# Patient Record
Sex: FEMALE | Race: WHITE | NOT HISPANIC OR LATINO | Employment: PART TIME | URBAN - METROPOLITAN AREA
[De-identification: names, ages, dates, MRNs, and addresses within clinical notes are randomized per-mention and may not be internally consistent; named-entity substitution may affect disease eponyms.]

---

## 2017-04-27 ENCOUNTER — LAB CONVERSION - ENCOUNTER (OUTPATIENT)
Dept: OBGYN CLINIC | Facility: CLINIC | Age: 22
End: 2017-04-27

## 2017-05-25 ENCOUNTER — LAB REQUISITION (OUTPATIENT)
Dept: LAB | Facility: HOSPITAL | Age: 22
End: 2017-05-25

## 2017-05-25 DIAGNOSIS — Z02.1 ENCOUNTER FOR PRE-EMPLOYMENT EXAMINATION: ICD-10-CM

## 2017-05-25 PROCEDURE — 86480 TB TEST CELL IMMUN MEASURE: CPT | Performed by: PHYSICIAN ASSISTANT

## 2017-05-27 LAB
ANNOTATION COMMENT IMP: NORMAL
GAMMA INTERFERON BACKGROUND BLD IA-ACNC: 0.68 IU/ML
M TB IFN-G BLD-IMP: NEGATIVE
M TB IFN-G CD4+ BCKGRND COR BLD-ACNC: <0.01 IU/ML
M TB IFN-G CD4+ T-CELLS BLD-ACNC: 0.66 IU/ML
MITOGEN IGNF BLD-ACNC: 8.79 IU/ML
QUANTIFERON-TB GOLD IN TUBE: NORMAL
SERVICE CMNT-IMP: NORMAL

## 2017-09-13 ENCOUNTER — GENERIC CONVERSION - ENCOUNTER (OUTPATIENT)
Dept: OTHER | Facility: OTHER | Age: 22
End: 2017-09-13

## 2017-12-05 ENCOUNTER — ALLSCRIPTS OFFICE VISIT (OUTPATIENT)
Dept: OTHER | Facility: OTHER | Age: 22
End: 2017-12-05

## 2017-12-09 LAB
CULT RSLT GENITAL (HISTORICAL): NORMAL
MISCELLANEOUS LAB TEST RESULT (HISTORICAL): NORMAL

## 2018-01-22 VITALS
WEIGHT: 159 LBS | HEART RATE: 73 BPM | DIASTOLIC BLOOD PRESSURE: 68 MMHG | SYSTOLIC BLOOD PRESSURE: 110 MMHG | BODY MASS INDEX: 24.96 KG/M2 | HEIGHT: 67 IN

## 2018-01-23 VITALS
DIASTOLIC BLOOD PRESSURE: 60 MMHG | WEIGHT: 157.25 LBS | HEART RATE: 80 BPM | HEIGHT: 67 IN | SYSTOLIC BLOOD PRESSURE: 112 MMHG | BODY MASS INDEX: 24.68 KG/M2

## 2018-02-19 ENCOUNTER — TELEPHONE (OUTPATIENT)
Dept: OBGYN CLINIC | Facility: CLINIC | Age: 23
End: 2018-02-19

## 2018-02-19 NOTE — TELEPHONE ENCOUNTER
Excellent that the pain is improved  Can take 3-4 months for periods to return after stopping OCP  Please encourage her to use condoms, or if she would like to discuss other birth control options to make an appt

## 2018-02-19 NOTE — TELEPHONE ENCOUNTER
Patient was asked to call back to let provider know results of lidocaine cream  Pt states it did help, but she stopped her OCP and all symptoms cleared  Pt wants to take a break from OCP at present  LMP 12/25 17 patient is a bit concerned she is having unprotected sex but she took 4 UPT's all negative  Advised patient it might take some time for menses to regulate, monitor for now call if no menses by 3/1/18  Routing to provider for further recommendations

## 2018-02-20 NOTE — TELEPHONE ENCOUNTER
Spoke with patient aware can take 3-4 months for periods to return after stopping OCP  Encouraged condoms  Pt declines other BC at present

## 2018-08-09 ENCOUNTER — TELEPHONE (OUTPATIENT)
Dept: OBGYN CLINIC | Facility: CLINIC | Age: 23
End: 2018-08-09

## 2018-08-09 NOTE — TELEPHONE ENCOUNTER
Pt called stating she is trying to become pregnant and is currently 8 days late on her period  Periods for her are always regular with 28 day cycles  Did take 2 home UPTs both neg  Pt has a yearly appt on Tuesday 8/14/18 with Dr Aidan Salinas and she is requesting blood work order script for neg or pos  Pregnancy to complete prior to appt so she can get results during appt       Let me know if blood work was placed, if pt should just wait a week or two and take another UPT or if she should just discuss it at the yearly appt

## 2018-08-10 DIAGNOSIS — Z32.00 UNCONFIRMED PREGNANCY: Primary | ICD-10-CM

## 2018-08-10 NOTE — TELEPHONE ENCOUNTER
Tried to call pt to inform  Cell  Phones v/m is full  Called house phone left v/m that order was placed  She can go to any [facility] lab and they can pull up the script there

## 2018-08-14 ENCOUNTER — ANNUAL EXAM (OUTPATIENT)
Dept: OBGYN CLINIC | Facility: CLINIC | Age: 23
End: 2018-08-14
Payer: COMMERCIAL

## 2018-08-14 VITALS — BODY MASS INDEX: 24.49 KG/M2 | HEIGHT: 66 IN | WEIGHT: 152.4 LBS

## 2018-08-14 DIAGNOSIS — N91.2 AMENORRHEA: ICD-10-CM

## 2018-08-14 DIAGNOSIS — Z01.419 WOMEN'S ANNUAL ROUTINE GYNECOLOGICAL EXAMINATION: Primary | ICD-10-CM

## 2018-08-14 PROBLEM — N76.0 VAGINAL VESTIBULITIS: Status: ACTIVE | Noted: 2017-12-05

## 2018-08-14 LAB — SL AMB POCT URINE HCG: NEGATIVE

## 2018-08-14 PROCEDURE — 81025 URINE PREGNANCY TEST: CPT | Performed by: OBSTETRICS & GYNECOLOGY

## 2018-08-14 PROCEDURE — 99395 PREV VISIT EST AGE 18-39: CPT | Performed by: OBSTETRICS & GYNECOLOGY

## 2018-08-15 PROBLEM — N76.0 VAGINAL VESTIBULITIS: Status: RESOLVED | Noted: 2017-12-05 | Resolved: 2018-08-15

## 2018-08-16 NOTE — PROGRESS NOTES
ASSESSMENT & PLAN: Haven Murdock is a 21 y o  Richardsergio Fuentes with normal gynecologic exam     1   Routine well woman exam done today  2    Pap  was not done today  Current ASCCP Guidelines reviewed  Last Pap  2016 :  no abnormalities  3   The patient declined STD testing  Safe sex practices have been discussed  4   Gardasil is recommended for patients from 526 years of age  The patient has had the Gardasil vaccine series  5  The patient is sexually active  She declined contraception and options have been discussed  6   Late menses - UPT neg today, pt will repeat in 2 weeks - 13 days late for period, accepting of a pregnancy  7  The following were reviewed in today's visit: breast self exam, STD testing, use and side effects of OCPs, family planning choices, exercise and healthy diet  8  Patient to return to office in 12 months for annual or if UPT is pos  All questions have been answered to her satisfaction  CC:  Annual Gynecologic Examination    HPI: Haven Murdock is a 21 y o  Xuan Fuentes who presents for annual gynecologic examination  She has the following concerns:  13 days late for period    Health Maintenance:    She exercises 4 days per week  She wears her seatbelt routinely  She does perform regular monthly self breast exams  She feels safe at home  Patients does follow a healthy diet  History reviewed  No pertinent past medical history  Past Surgical History:   Procedure Laterality Date    TONSILLECTOMY  2015    WISDOM TOOTH EXTRACTION         Past OB/Gyn History:  Period Cycle (Days): 28  Period Pattern: Regular  Menstrual Flow: Moderate  Dysmenorrhea: NonePatient's last menstrual period was 2018 (exact date)  Menstrual History:  OB History      Para Term  AB Living    0 0 0 0 0 0    SAB TAB Ectopic Multiple Live Births    0 0 0 0 0         History of sexually transmitted infection No  Patient is currently sexually active    heterosexual Birth control: none   Last Pap  2016 :  no abnormalities  History reviewed  No pertinent family history  Social History:  Social History     Social History    Marital status: Single     Spouse name: N/A    Number of children: N/A    Years of education: N/A     Occupational History    Not on file  Social History Main Topics    Smoking status: Never Smoker    Smokeless tobacco: Never Used    Alcohol use No    Drug use: No    Sexual activity: No     Other Topics Concern    Not on file     Social History Narrative    No narrative on file     Presently lives with fiale  Patient is co-habitating  Patient is currently employed   No Known Allergies  No current outpatient prescriptions on file  Review of Systems:  A complete review of systems was performed and was negative, except as listed  Denies weight changes, excessive fatigue, urinary incontinence, urinary frequency, constipation or bowel changes, blood in stool, severe headaches, vaginal bleeding, vaginal discharge  Physical Exam:  Ht 5' 6" (1 676 m)   Wt 69 1 kg (152 lb 6 4 oz)   LMP 07/05/2018 (Exact Date)   Breastfeeding? No   BMI 24 60 kg/m²    GEN: The patient was alert and oriented x3, pleasant well-appearing female in no acute distress  HEENT:  Unremarkable  CV:  RRR, no murmurs  RESP:  Clear to auscultation bilaterally  BREAST:  Symmetric breasts with no palpable breast masses or obvious breast lesions  She has no retractions or nipple discharge  She has no axillary abnormalities or palpable masses  Self breast exam is taught  ABD:  Soft, nontender, non-distended  EXT: nontender, no edema  PELVIC:  Normal appearing external female genitalia, normal vaginal epithelium, No discharge  Cervix present   Bimanual: absent CMT,  normal uterus, non-tender  No palpable adnexal masses  Pap was not collected

## 2019-01-02 ENCOUNTER — TELEPHONE (OUTPATIENT)
Dept: OBGYN CLINIC | Facility: CLINIC | Age: 24
End: 2019-01-02

## 2019-01-02 DIAGNOSIS — Z31.9 INFERTILITY MANAGEMENT: Primary | ICD-10-CM

## 2019-01-02 NOTE — TELEPHONE ENCOUNTER
Patient left message on nurse line states she been off OCP since 12/2017 and has had unprotected sex since  For the last 3 months she has been trying to get pregnant using OPK and tracing menses  She would like to know if she can get some hormone levels check  Requesting Estrogen, FSH, Progesterone, LH and AMA  She is currently in the middle of her cycle right now  Routing to provider for advise

## 2019-01-03 PROBLEM — Z31.9 INFERTILITY MANAGEMENT: Status: ACTIVE | Noted: 2019-01-03

## 2019-01-03 NOTE — TELEPHONE ENCOUNTER
Spoke with patient advised she needs to be attempts for 12 months  States she went off OCP 12/18 first period 4/17 since menses have been 26-42  Please advise

## 2019-01-04 NOTE — TELEPHONE ENCOUNTER
Spoke with patient advised labs were ordered she should continue to monitor cycles for the next 3-6 months  Timed intercourse with OPK  She states she did one OPK three months ago did not get any positive therefore she stopped doing them and started monitor vaginal mucous and having intercourse between days 10-7  She was having intercourse daily advised to do OPK and intercouse every other day  If she doesn't get any positive OPK after 2 months call the office  Routing to provider to update

## 2019-01-04 NOTE — TELEPHONE ENCOUNTER
Ordered the labs  Would recommend pt continue to monitor cycle for the next 3-6 months before doing labs  Continue timed intercourse with OPK  Have the OPK's been positive?

## 2019-02-20 ENCOUNTER — TELEPHONE (OUTPATIENT)
Dept: OBGYN CLINIC | Facility: CLINIC | Age: 24
End: 2019-02-20

## 2019-02-20 DIAGNOSIS — N94.819 VULVODYNIA: Primary | ICD-10-CM

## 2019-02-20 NOTE — TELEPHONE ENCOUNTER
Patient states she has been amoxicillin and doxycycline and now is having vaginal burning, itching and inflammation  Denies discharge  In the past she had vulvodynia  Advised Monistat 7 if symptoms do not resolve or worsen to call the office  Pt would like new prescription for lidocaine gel  Pharmacy updated  Routing to provider to order

## 2019-02-21 NOTE — TELEPHONE ENCOUNTER
Patient states she took Monistat 3 earlier this week then yesterday she started a Monistat 7  Monistat 7 is very irritating for her  She states she also started OCP about 2 weeks ago and these are the same symptoms she had last time she was on OCP  Pharmacy updated  Routing to provider for advise

## 2019-03-05 ENCOUNTER — TELEPHONE (OUTPATIENT)
Dept: OBGYN CLINIC | Facility: CLINIC | Age: 24
End: 2019-03-05

## 2019-03-05 DIAGNOSIS — B00.9 HSV INFECTION: Primary | ICD-10-CM

## 2019-03-05 RX ORDER — VALACYCLOVIR HYDROCHLORIDE 500 MG/1
500 TABLET, FILM COATED ORAL DAILY
Qty: 30 TABLET | Refills: 2 | Status: SHIPPED | OUTPATIENT
Start: 2019-03-05 | End: 2019-08-29 | Stop reason: ALTCHOICE

## 2019-03-05 NOTE — TELEPHONE ENCOUNTER
Patient states she had an herpes outbreak about 2 weeks ago  She is trying to conceive and wants to know if she can take Valtrex daily  Pharmacy updated       Routing to provider for orders

## 2019-03-05 NOTE — TELEPHONE ENCOUNTER
Patient left message on nurse line would like to know if she can start on Valtrex daily, she has had a few outbreaks and wants to get under control  She would like to try and conceive  LM for patient to call the office

## 2019-03-06 NOTE — TELEPHONE ENCOUNTER
Spoke with patient advised Valtrex has been sent to pharmacy  Please take 500 mg daily  Verbalized understanding

## 2019-04-29 ENCOUNTER — HOSPITAL ENCOUNTER (EMERGENCY)
Facility: HOSPITAL | Age: 24
Discharge: HOME | End: 2019-04-30
Attending: EMERGENCY MEDICINE | Admitting: EMERGENCY MEDICINE
Payer: COMMERCIAL

## 2019-04-29 ENCOUNTER — APPOINTMENT (EMERGENCY)
Dept: CT IMAGING | Facility: HOSPITAL | Age: 24
End: 2019-04-29
Payer: COMMERCIAL

## 2019-04-29 DIAGNOSIS — N83.209 RUPTURED OVARIAN CYST: Primary | ICD-10-CM

## 2019-04-29 LAB
ALBUMIN SERPL BCP-MCNC: 4.2 G/DL (ref 3.5–5)
ALP SERPL-CCNC: 58 U/L (ref 46–116)
ALT SERPL W P-5'-P-CCNC: 22 U/L (ref 12–78)
ANION GAP SERPL CALCULATED.3IONS-SCNC: 9 MMOL/L (ref 4–13)
AST SERPL W P-5'-P-CCNC: 21 U/L (ref 5–45)
BACTERIA UR QL AUTO: ABNORMAL /HPF
BASOPHILS # BLD AUTO: 0.04 THOUSANDS/ΜL (ref 0–0.1)
BASOPHILS NFR BLD AUTO: 0 % (ref 0–1)
BILIRUB SERPL-MCNC: 0.4 MG/DL (ref 0.2–1)
BILIRUB UR QL STRIP: NEGATIVE
BUN SERPL-MCNC: 16 MG/DL (ref 5–25)
CALCIUM SERPL-MCNC: 9.8 MG/DL (ref 8.3–10.1)
CHLORIDE SERPL-SCNC: 103 MMOL/L (ref 100–108)
CLARITY UR: CLEAR
CO2 SERPL-SCNC: 27 MMOL/L (ref 21–32)
COLOR UR: YELLOW
CREAT SERPL-MCNC: 0.88 MG/DL (ref 0.6–1.3)
EOSINOPHIL # BLD AUTO: 0.04 THOUSAND/ΜL (ref 0–0.61)
EOSINOPHIL NFR BLD AUTO: 0 % (ref 0–6)
ERYTHROCYTE [DISTWIDTH] IN BLOOD BY AUTOMATED COUNT: 12.1 % (ref 11.6–15.1)
EXT PREG TEST URINE: NEGATIVE
GFR SERPL CREATININE-BSD FRML MDRD: 92 ML/MIN/1.73SQ M
GLUCOSE SERPL-MCNC: 118 MG/DL (ref 65–140)
GLUCOSE UR STRIP-MCNC: NEGATIVE MG/DL
HCT VFR BLD AUTO: 40.9 % (ref 34.8–46.1)
HGB BLD-MCNC: 14.3 G/DL (ref 11.5–15.4)
HGB UR QL STRIP.AUTO: ABNORMAL
IMM GRANULOCYTES # BLD AUTO: 0.04 THOUSAND/UL (ref 0–0.2)
IMM GRANULOCYTES NFR BLD AUTO: 0 % (ref 0–2)
KETONES UR STRIP-MCNC: ABNORMAL MG/DL
LEUKOCYTE ESTERASE UR QL STRIP: NEGATIVE
LIPASE SERPL-CCNC: 79 U/L (ref 73–393)
LYMPHOCYTES # BLD AUTO: 1.77 THOUSANDS/ΜL (ref 0.6–4.47)
LYMPHOCYTES NFR BLD AUTO: 14 % (ref 14–44)
MCH RBC QN AUTO: 31 PG (ref 26.8–34.3)
MCHC RBC AUTO-ENTMCNC: 35 G/DL (ref 31.4–37.4)
MCV RBC AUTO: 89 FL (ref 82–98)
MONOCYTES # BLD AUTO: 0.48 THOUSAND/ΜL (ref 0.17–1.22)
MONOCYTES NFR BLD AUTO: 4 % (ref 4–12)
MUCOUS THREADS UR QL AUTO: ABNORMAL
NEUTROPHILS # BLD AUTO: 10.47 THOUSANDS/ΜL (ref 1.85–7.62)
NEUTS SEG NFR BLD AUTO: 82 % (ref 43–75)
NITRITE UR QL STRIP: NEGATIVE
NON-SQ EPI CELLS URNS QL MICRO: ABNORMAL /HPF
NRBC BLD AUTO-RTO: 0 /100 WBCS
PH UR STRIP.AUTO: 7 [PH] (ref 4.5–8)
PLATELET # BLD AUTO: 273 THOUSANDS/UL (ref 149–390)
PMV BLD AUTO: 9.2 FL (ref 8.9–12.7)
POTASSIUM SERPL-SCNC: 3.8 MMOL/L (ref 3.5–5.3)
PROT SERPL-MCNC: 7.8 G/DL (ref 6.4–8.2)
PROT UR STRIP-MCNC: NEGATIVE MG/DL
RBC # BLD AUTO: 4.62 MILLION/UL (ref 3.81–5.12)
RBC #/AREA URNS AUTO: ABNORMAL /HPF
SODIUM SERPL-SCNC: 139 MMOL/L (ref 136–145)
SP GR UR STRIP.AUTO: 1.02 (ref 1–1.03)
UROBILINOGEN UR QL STRIP.AUTO: 0.2 E.U./DL
WBC # BLD AUTO: 12.84 THOUSAND/UL (ref 4.31–10.16)
WBC #/AREA URNS AUTO: ABNORMAL /HPF

## 2019-04-29 PROCEDURE — 96374 THER/PROPH/DIAG INJ IV PUSH: CPT

## 2019-04-29 PROCEDURE — 74177 CT ABD & PELVIS W/CONTRAST: CPT

## 2019-04-29 PROCEDURE — 99284 EMERGENCY DEPT VISIT MOD MDM: CPT | Performed by: EMERGENCY MEDICINE

## 2019-04-29 PROCEDURE — 80053 COMPREHEN METABOLIC PANEL: CPT | Performed by: EMERGENCY MEDICINE

## 2019-04-29 PROCEDURE — 36415 COLL VENOUS BLD VENIPUNCTURE: CPT | Performed by: EMERGENCY MEDICINE

## 2019-04-29 PROCEDURE — 96375 TX/PRO/DX INJ NEW DRUG ADDON: CPT

## 2019-04-29 PROCEDURE — 81001 URINALYSIS AUTO W/SCOPE: CPT

## 2019-04-29 PROCEDURE — 99284 EMERGENCY DEPT VISIT MOD MDM: CPT

## 2019-04-29 PROCEDURE — 96361 HYDRATE IV INFUSION ADD-ON: CPT

## 2019-04-29 PROCEDURE — 83690 ASSAY OF LIPASE: CPT | Performed by: EMERGENCY MEDICINE

## 2019-04-29 PROCEDURE — 81025 URINE PREGNANCY TEST: CPT | Performed by: EMERGENCY MEDICINE

## 2019-04-29 PROCEDURE — 85025 COMPLETE CBC W/AUTO DIFF WBC: CPT | Performed by: EMERGENCY MEDICINE

## 2019-04-29 RX ORDER — ACETAMINOPHEN 325 MG/1
650 TABLET ORAL ONCE
Status: COMPLETED | OUTPATIENT
Start: 2019-04-29 | End: 2019-04-29

## 2019-04-29 RX ORDER — ONDANSETRON 2 MG/ML
4 INJECTION INTRAMUSCULAR; INTRAVENOUS ONCE
Status: COMPLETED | OUTPATIENT
Start: 2019-04-29 | End: 2019-04-29

## 2019-04-29 RX ORDER — KETOROLAC TROMETHAMINE 30 MG/ML
10 INJECTION, SOLUTION INTRAMUSCULAR; INTRAVENOUS ONCE
Status: COMPLETED | OUTPATIENT
Start: 2019-04-29 | End: 2019-04-29

## 2019-04-29 RX ADMIN — KETOROLAC TROMETHAMINE 9.9 MG: 30 INJECTION, SOLUTION INTRAMUSCULAR; INTRAVENOUS at 23:23

## 2019-04-29 RX ADMIN — ONDANSETRON 4 MG: 2 INJECTION INTRAMUSCULAR; INTRAVENOUS at 23:22

## 2019-04-29 RX ADMIN — SODIUM CHLORIDE 1000 ML: 0.9 INJECTION, SOLUTION INTRAVENOUS at 23:20

## 2019-04-29 RX ADMIN — ACETAMINOPHEN 650 MG: 325 TABLET, FILM COATED ORAL at 23:21

## 2019-04-30 VITALS
WEIGHT: 161 LBS | HEART RATE: 74 BPM | RESPIRATION RATE: 16 BRPM | BODY MASS INDEX: 25.99 KG/M2 | OXYGEN SATURATION: 100 % | SYSTOLIC BLOOD PRESSURE: 107 MMHG | DIASTOLIC BLOOD PRESSURE: 55 MMHG | TEMPERATURE: 97.6 F

## 2019-04-30 RX ADMIN — IOHEXOL 100 ML: 350 INJECTION, SOLUTION INTRAVENOUS at 00:15

## 2019-08-08 ENCOUNTER — TELEPHONE (OUTPATIENT)
Dept: INFECTIOUS DISEASES | Facility: CLINIC | Age: 24
End: 2019-08-08

## 2019-08-08 NOTE — TELEPHONE ENCOUNTER
Received a call from this patient who is seeing fertility specialist who performed lab tests and her lyme titer came back positive  She is being referred to us for treatment   Patient is possibly pregnant and will know for sure after tomorrow as she is getting a blood pregnancy test  I gave pt our fax number to have her fertility specialist fax us the lab results and a physician to physician referral

## 2019-08-28 RX ORDER — VEHICLE BASE NO.18 58%-41%-1%
WAX (GRAM) MISCELLANEOUS
COMMUNITY
Start: 2019-07-17 | End: 2019-08-29 | Stop reason: ALTCHOICE

## 2019-08-28 RX ORDER — ESTRADIOL 2 MG/1
TABLET ORAL
COMMUNITY
Start: 2019-07-17 | End: 2019-08-29 | Stop reason: ALTCHOICE

## 2019-08-28 RX ORDER — CHORIONIC GONADOTROPIN 10000 UNIT
KIT INTRAMUSCULAR
COMMUNITY
Start: 2019-05-24 | End: 2019-08-29 | Stop reason: ALTCHOICE

## 2019-08-28 RX ORDER — PROGESTERONE 50 MG/ML
INJECTION, SOLUTION INTRAMUSCULAR
COMMUNITY
Start: 2019-07-17 | End: 2019-08-29 | Stop reason: ALTCHOICE

## 2019-08-28 RX ORDER — METRONIDAZOLE 7.5 MG/G
GEL VAGINAL
Refills: 0 | COMMUNITY
Start: 2019-06-10 | End: 2019-08-29 | Stop reason: ALTCHOICE

## 2019-08-29 ENCOUNTER — OFFICE VISIT (OUTPATIENT)
Dept: INFECTIOUS DISEASES | Facility: CLINIC | Age: 24
End: 2019-08-29
Payer: COMMERCIAL

## 2019-08-29 VITALS
RESPIRATION RATE: 16 BRPM | TEMPERATURE: 96.6 F | SYSTOLIC BLOOD PRESSURE: 116 MMHG | HEART RATE: 68 BPM | WEIGHT: 154 LBS | DIASTOLIC BLOOD PRESSURE: 70 MMHG | BODY MASS INDEX: 24.75 KG/M2 | HEIGHT: 66 IN

## 2019-08-29 DIAGNOSIS — A69.20 LYME DISEASE: Primary | ICD-10-CM

## 2019-08-29 DIAGNOSIS — Z31.9 INFERTILITY MANAGEMENT: ICD-10-CM

## 2019-08-29 PROCEDURE — 99203 OFFICE O/P NEW LOW 30 MIN: CPT | Performed by: INTERNAL MEDICINE

## 2019-08-29 RX ORDER — NORETHINDRONE AND ETHINYL ESTRADIOL 1 MG-35MCG
1 KIT ORAL DAILY
Refills: 0 | COMMUNITY
Start: 2019-08-15 | End: 2021-04-15

## 2019-08-29 NOTE — PATIENT INSTRUCTIONS
You were appropriately treated for your presumed Lyme disease  This is considered cured  Since your Lyme test looks for antibodies in your blood, it may remain positive for years  We do not recommend repeat testing for this reason  As you are clinically improved, you are cleared from an infectious disease standpoint to continue to pursue your fertility treatment at any time  Good luck!

## 2019-08-29 NOTE — PROGRESS NOTES
Outpatient Consultation - Infectious Disease   Joice Carrel 25 y o  female MRN: [id number]      IMPRESSION & RECOMMENDATIONS:   Impression/Recommendations:  1  Lyme disease  Characterized by classic EM rash, neck stiffness, constitutional symptoms, and positive Western blot IgM  Status post appropriate course of 2 weeks of amoxicillin  Clinically improved  Treatment course is considered curative  Rec:   · Pathophysiology, clinical presentation, diagnosis, and treatment of Lyme disease discussed in detail with the patient  · As treatment course is considered curative, no further antibiotics indicated  · As Western blot may remain positive for years, do not recommend follow-up testing  · As patient clinically improved, would be cleared from an ID perspective to continue to pursue fertility treatment as clinically indicated    2  Infertility  Undergoing IVF  Return to office only as needed  Antibiotics:  None    Thank you for this consultation  HISTORY OF PRESENT ILLNESS:  Reason for Consult:  Lyme disease    HPI: Joice Carrel is a 25 y o  female otherwise healthy  She is currently being seen by a reproductive specialist and undergoing infertility treatment  Earlier this month she developed a diffuse red circular rash over her body associated with neck stiffness  She showed me pictures on her cell phone which revealed a flat red circular bull's-eye like rash on her upper arm  [place]  She does have a pet dog which sleeps in the same bed as her  She underwent Lyme testing which revealed a positive Lyme IgM on Western blot  She was treated with 14 days of Augmentin 3 times daily which she finished on Saturday  Doxycycline was avoided as she was undergoing embryo transfer at the same time  She states she feels clinically improved  She denies any residual rash, fever, headache, fatigue, or joint symptoms    She is currently awaiting surgery for an ovarian cyst and then will be pursuing embryo transfer in approximately 6 weeks  We are asked to comment on further evaluation and management  REVIEW OF SYSTEMS:  As per HPI  A complete system-based review of systems is otherwise negative  PAST MEDICAL HISTORY:  No past medical history on file  Past Surgical History:   Procedure Laterality Date    TONSILLECTOMY  2015    WISDOM TOOTH EXTRACTION  2012       FAMILY HISTORY:  Non-contributory    SOCIAL HISTORY:  Social History     Substance and Sexual Activity   Alcohol Use No     Social History     Substance and Sexual Activity   Drug Use No     Social History     Tobacco Use   Smoking Status Never Smoker   Smokeless Tobacco Never Used       ALLERGIES:  No Known Allergies    MEDICATIONS:  All current active medications have been reviewed  PHYSICAL EXAM:  Vitals:  /70   Pulse 68   Temp (!) 96 6 °F (35 9 °C)   Resp 16   Ht 5' 6" (1 676 m)   Wt 69 9 kg (154 lb)   BMI 24 86 kg/m²      Physical Exam:  General:  Well-nourished, well-developed, in no acute distress  Eyes:  Conjunctive clear with no hemorrhages or effusions  Neck:  Full range of motion  Lungs:  Clear to auscultation bilaterally, no accessory muscle use  Cardiac:  Regular rate  Abdomen:  Nondistended  Extremities:  No peripheral cyanosis, clubbing, or edema  Skin:  No rashes, no ulcers  Neurological:  Moves all four extremities spontaneously, sensation grossly intact    LABS, IMAGING, & OTHER STUDIES:  Lab Results:  I have personally reviewed pertinent labs  Lyme Western blot from lab core 08/05/2019 IgM positive, IgG negative    Imaging Studies:   I have personally reviewed pertinent imaging study reports and images in PACS  EKG, Pathology, and Other Studies:   I have personally reviewed pertinent reports

## 2019-12-04 LAB
EXTERNAL CHLAMYDIA RESULT: NOT DETECTED
EXTERNAL HIV SCREEN: NORMAL
N GONORRHOEA RRNA SPEC QL PROBE: NOT DETECTED

## 2020-06-16 LAB — EXT SARS-COV-2: NEGATIVE

## 2021-01-30 LAB
EXTERNAL CHLAMYDIA RESULT: NEGATIVE
EXTERNAL HIV SCREEN: NORMAL
HCV AB SER-ACNC: NEGATIVE
N GONORRHOEA RRNA SPEC QL PROBE: NEGATIVE

## 2021-04-15 ENCOUNTER — OFFICE VISIT (OUTPATIENT)
Dept: OBGYN CLINIC | Facility: CLINIC | Age: 26
End: 2021-04-15
Payer: COMMERCIAL

## 2021-04-15 VITALS
SYSTOLIC BLOOD PRESSURE: 112 MMHG | WEIGHT: 178.6 LBS | BODY MASS INDEX: 28.7 KG/M2 | HEIGHT: 66 IN | DIASTOLIC BLOOD PRESSURE: 64 MMHG

## 2021-04-15 DIAGNOSIS — Z11.3 SCREENING FOR STD (SEXUALLY TRANSMITTED DISEASE): ICD-10-CM

## 2021-04-15 DIAGNOSIS — N91.2 AMENORRHEA: Primary | ICD-10-CM

## 2021-04-15 DIAGNOSIS — Z12.4 SCREENING FOR MALIGNANT NEOPLASM OF CERVIX: ICD-10-CM

## 2021-04-15 LAB — SL AMB POCT URINE HCG: ABNORMAL

## 2021-04-15 PROCEDURE — 81025 URINE PREGNANCY TEST: CPT | Performed by: PHYSICIAN ASSISTANT

## 2021-04-15 PROCEDURE — 99213 OFFICE O/P EST LOW 20 MIN: CPT | Performed by: PHYSICIAN ASSISTANT

## 2021-04-15 PROCEDURE — 87591 N.GONORRHOEAE DNA AMP PROB: CPT | Performed by: PHYSICIAN ASSISTANT

## 2021-04-15 PROCEDURE — 87491 CHLMYD TRACH DNA AMP PROBE: CPT | Performed by: PHYSICIAN ASSISTANT

## 2021-04-15 RX ORDER — PROGESTERONE 100 MG/1
50 INSERT VAGINAL 3 TIMES DAILY
COMMUNITY
Start: 2021-04-02 | End: 2021-05-11 | Stop reason: HOSPADM

## 2021-04-15 RX ORDER — VALACYCLOVIR HYDROCHLORIDE 500 MG/1
500 TABLET, FILM COATED ORAL
COMMUNITY
End: 2021-05-10 | Stop reason: ALTCHOICE

## 2021-04-15 RX ORDER — LEVOTHYROXINE SODIUM 0.03 MG/1
TABLET ORAL
COMMUNITY
Start: 2021-03-28 | End: 2021-05-10 | Stop reason: ALTCHOICE

## 2021-04-15 RX ORDER — DOXYCYCLINE HYCLATE 100 MG/1
CAPSULE ORAL
COMMUNITY
Start: 2021-02-03 | End: 2021-04-15 | Stop reason: ALTCHOICE

## 2021-04-15 RX ORDER — MELATONIN
1000 DAILY
COMMUNITY

## 2021-04-15 RX ORDER — ASPIRIN 81 MG/1
81 TABLET ORAL
COMMUNITY
End: 2021-04-28 | Stop reason: ALTCHOICE

## 2021-04-17 LAB
C TRACH DNA SPEC QL NAA+PROBE: NEGATIVE
N GONORRHOEA DNA SPEC QL NAA+PROBE: NEGATIVE

## 2021-04-22 ENCOUNTER — OFFICE VISIT (OUTPATIENT)
Dept: OBGYN CLINIC | Facility: CLINIC | Age: 26
End: 2021-04-22
Payer: COMMERCIAL

## 2021-04-22 VITALS — DIASTOLIC BLOOD PRESSURE: 70 MMHG | WEIGHT: 181 LBS | SYSTOLIC BLOOD PRESSURE: 112 MMHG | BODY MASS INDEX: 29.21 KG/M2

## 2021-04-22 DIAGNOSIS — N91.2 AMENORRHEA: Primary | ICD-10-CM

## 2021-04-22 PROCEDURE — 99213 OFFICE O/P EST LOW 20 MIN: CPT | Performed by: PHYSICIAN ASSISTANT

## 2021-04-22 NOTE — PROGRESS NOTES
Assessment/Plan:  - TVUS reveals viable IUP at 7w6d GA, with  bpm  - Continue PNV  - Call for concerns  - Has ob intake scheduled       Diagnoses and all orders for this visit:    Amenorrhea  -     AMB US OB < 14 weeks single or first gestation level 1          Subjective:      Patient ID: Mike Cai is a 32 y o  female  Parish Caceres is a 27YO [id number] WF presenting to the office for early pregnancy US followup  She is feeling well at this time and has no complaints  Last week, patient presented for US with viable IUP, measuring 6w4d GA and FHR of 128bpm  She denies bleeding, cramping or abdominal pain  The following portions of the patient's history were reviewed and updated as appropriate: allergies, current medications, past family history, past medical history, past social history, past surgical history and problem list     Review of Systems   Constitutional: Negative for chills, fever and unexpected weight change  Respiratory: Negative for shortness of breath  Cardiovascular: Negative for chest pain  Gastrointestinal: Negative for abdominal pain, diarrhea, nausea and vomiting  Skin: Negative for rash  Objective:      /70   Wt 82 1 kg (181 lb)   LMP 02/16/2021 (Exact Date)   Breastfeeding No   BMI 29 21 kg/m²          Physical Exam  Vitals signs reviewed  Constitutional:       Appearance: Normal appearance  She is normal weight  HENT:      Head: Normocephalic and atraumatic  Pulmonary:      Effort: Pulmonary effort is normal    Abdominal:      General: Abdomen is flat  Palpations: Abdomen is soft  Genitourinary:     Pubic Area: No rash  Labia:         Right: No rash or lesion  Left: No rash or lesion  Comments: TVUS reveals IUP, yolk sac, fetal pole, +CM  CRL 1 54cm (7w6d GA)   bpm  Skin:     General: Skin is warm and dry  Neurological:      General: No focal deficit present  Mental Status: She is alert     Psychiatric: Mood and Affect: Mood normal          Behavior: Behavior normal              Ultrasound Probe Disinfection    A transvaginal ultrasound was performed     Prior to use, disinfection was performed with High Level Disinfection Process (Trophon)  Probe serial number [id number]: [id number] was used    Kelton Dumont PA-C  04/22/21  3:44 PM

## 2021-04-28 ENCOUNTER — INITIAL PRENATAL (OUTPATIENT)
Dept: OBGYN CLINIC | Facility: CLINIC | Age: 26
End: 2021-04-28

## 2021-04-28 ENCOUNTER — APPOINTMENT (OUTPATIENT)
Dept: LAB | Facility: AMBULARY SURGERY CENTER | Age: 26
End: 2021-04-28
Payer: COMMERCIAL

## 2021-04-28 VITALS
DIASTOLIC BLOOD PRESSURE: 64 MMHG | HEIGHT: 66 IN | BODY MASS INDEX: 28.64 KG/M2 | WEIGHT: 178.2 LBS | SYSTOLIC BLOOD PRESSURE: 116 MMHG

## 2021-04-28 DIAGNOSIS — O09.891 HISTORY OF PRETERM DELIVERY, CURRENTLY PREGNANT IN FIRST TRIMESTER: ICD-10-CM

## 2021-04-28 DIAGNOSIS — Z3A.08 8 WEEKS GESTATION OF PREGNANCY: ICD-10-CM

## 2021-04-28 DIAGNOSIS — O09.891 HISTORY OF PRETERM DELIVERY, CURRENTLY PREGNANT IN FIRST TRIMESTER: Primary | ICD-10-CM

## 2021-04-28 DIAGNOSIS — Z87.59 HISTORY OF TWIN PREGNANCY IN PRIOR PREGNANCY: ICD-10-CM

## 2021-04-28 DIAGNOSIS — Z98.891 HISTORY OF CESAREAN SECTION, LOW TRANSVERSE: ICD-10-CM

## 2021-04-28 LAB
ABO GROUP BLD: NORMAL
BLD GP AB SCN SERPL QL: NEGATIVE
ERYTHROCYTE [DISTWIDTH] IN BLOOD BY AUTOMATED COUNT: 12.7 % (ref 11.6–15.1)
HBV SURFACE AG SER QL: NORMAL
HCT VFR BLD AUTO: 40.7 % (ref 34.8–46.1)
HCV AB SER QL: NORMAL
HGB BLD-MCNC: 13.9 G/DL (ref 11.5–15.4)
HIV 1+2 AB+HIV1 P24 AG SERPL QL IA: NORMAL
MCH RBC QN AUTO: 30.2 PG (ref 26.8–34.3)
MCHC RBC AUTO-ENTMCNC: 34.2 G/DL (ref 31.4–37.4)
MCV RBC AUTO: 88 FL (ref 82–98)
PLATELET # BLD AUTO: 251 THOUSANDS/UL (ref 149–390)
PMV BLD AUTO: 9.8 FL (ref 8.9–12.7)
RBC # BLD AUTO: 4.61 MILLION/UL (ref 3.81–5.12)
RH BLD: NEGATIVE
RUBV IGG SERPL IA-ACNC: >175 IU/ML
SPECIMEN EXPIRATION DATE: NORMAL
WBC # BLD AUTO: 5.53 THOUSAND/UL (ref 4.31–10.16)

## 2021-04-28 PROCEDURE — 87340 HEPATITIS B SURFACE AG IA: CPT

## 2021-04-28 PROCEDURE — OBC: Performed by: PHYSICIAN ASSISTANT

## 2021-04-28 PROCEDURE — 86803 HEPATITIS C AB TEST: CPT

## 2021-04-28 PROCEDURE — 86592 SYPHILIS TEST NON-TREP QUAL: CPT

## 2021-04-28 PROCEDURE — 86850 RBC ANTIBODY SCREEN: CPT

## 2021-04-28 PROCEDURE — 36415 COLL VENOUS BLD VENIPUNCTURE: CPT

## 2021-04-28 PROCEDURE — 86762 RUBELLA ANTIBODY: CPT

## 2021-04-28 PROCEDURE — 85027 COMPLETE CBC AUTOMATED: CPT

## 2021-04-28 PROCEDURE — 86900 BLOOD TYPING SEROLOGIC ABO: CPT

## 2021-04-28 PROCEDURE — 87389 HIV-1 AG W/HIV-1&-2 AB AG IA: CPT

## 2021-04-28 PROCEDURE — 86787 VARICELLA-ZOSTER ANTIBODY: CPT

## 2021-04-28 PROCEDURE — 86901 BLOOD TYPING SEROLOGIC RH(D): CPT

## 2021-04-28 PROCEDURE — 87086 URINE CULTURE/COLONY COUNT: CPT

## 2021-04-28 NOTE — PROGRESS NOTES
Mela hill 27YO [id number]  presenting to the office for OB intake  She is feeling well today and has no complaints  Patient has a hx of infertility, but reports this pregnancy was a spontaneous conception  She had IVF with her last pregnancy and had a primary c/s for mono/di twins  Patient desires a TOLAC for this pregnancy  Patient is currently taking injectable progesterone daily given to her by A  She is due to stop that injection on , and [facility] to have a prog level drawn when she stops that medication  She is also currently on levothyroxine for a hx of PP thyroiditis from her last pregnancy  Last TSH was 0 9 per patient, drawn about 2 weeks ago  She was followed by endocrine who recommended monthly TSH draws  Patient presents for OB intake interview  Patient oriented to practice, different practice providers, different practice locations  Reviewed expected prenatal visit schedule  Accompanied by:     OB History        3    Para   1    Term   0       1    AB   1    Living   2       SAB   0    TAB   0    Ectopic   0    Multiple   1    Live Births   2                 Hx of  delivery prior to 36 weeks 6 days: yes, mono/di twins, delivered at 36 3 weeks by primary c/s     Last Menstrual Period: Patient's last menstrual period was 2021 (exact date)  Estimated Date of Delivery: 12/3/21 based on early US on                 Signs/Symptoms of Pregnancy                            Breast tenderness: no              Fatigue: yes              Cramping: no              Nausea/Vomiting: no     Pregravid BMI: Body mass index is 28  Discussed appropriate weight gain in pregnancy based on pre-gravid BMI       Diabetes              Pregestational DM:  no              hx of GDM: no              BMI >35: no              first degree relative with type 2 diabetes: no              hx of PCOS: no, reports hx of oligoanovulation              current metformin use: no              prior hx of LGA/macrosomia: no                   Hypertension              Hx of chronic HTN: no              hx of gestational HTN: no              hx of preeclampsia, eclampsia, or HELLP syndrome: no              Family h/o preeclampsia:no              Age 28 or older:no              Multifetal gestation: yes, twins, mono/di  Type 1 or Type 2 DM: no  Renal Disease:no  Autoimmune disease (systemic lupus erythematosus, antiphospholipid antibody syndrome): no  Nulliparity: no  Obesity (BMI over 30): no  More than 10 year pregnancy interval: no  Previous IUGR, low birthweight or small for gestational age: no        Infection Screening              Does the pt have a hx of MRSA? no              Recent travel outside of [place]? no  Zika precautions discussed: yes     Immunizations:              influenza vaccine: declines              discussed Tdap vaccine administration at 27-28 weeks   Declines covid vaccine     Interview education              [facility] Pregnancy Essentials Book reviewed and discussed                          Handouts given:                          Nutrition During Pregnancy  Prenatal Genetic Screening Tests                          Immunization & Pregnancy                          Medications & Pregnancy                          Warning Signs During Pregnancy                          Baby and Me support center                          [facility] Childbirth and Parenting Classes  [facility] [address] in Pregnancy    Dental visit with last 6 months -   PHQ-2/9 score: 0         MyChart activated (not 1518 years of age)?:   § Code provided?            Interview Done by: Cosme Kuhn PA-C

## 2021-04-29 LAB
BACTERIA UR CULT: NORMAL
RPR SER QL: NORMAL
VZV IGG SER IA-ACNC: NORMAL

## 2021-05-08 ENCOUNTER — APPOINTMENT (EMERGENCY)
Dept: ULTRASOUND IMAGING | Facility: HOSPITAL | Age: 26
End: 2021-05-08
Payer: COMMERCIAL

## 2021-05-08 ENCOUNTER — HOSPITAL ENCOUNTER (EMERGENCY)
Facility: HOSPITAL | Age: 26
Discharge: HOME | End: 2021-05-08
Attending: EMERGENCY MEDICINE
Payer: COMMERCIAL

## 2021-05-08 VITALS
TEMPERATURE: 98.6 F | SYSTOLIC BLOOD PRESSURE: 118 MMHG | RESPIRATION RATE: 18 BRPM | HEART RATE: 89 BPM | OXYGEN SATURATION: 99 % | DIASTOLIC BLOOD PRESSURE: 71 MMHG

## 2021-05-08 DIAGNOSIS — O03.9 FETAL DEMISE DUE TO MISCARRIAGE: Primary | ICD-10-CM

## 2021-05-08 LAB
ABO GROUP BLD: NORMAL
BLD GP AB SCN SERPL QL: NEGATIVE
RH BLD: NEGATIVE

## 2021-05-08 PROCEDURE — NC001 PR NO CHARGE: Performed by: OBSTETRICS & GYNECOLOGY

## 2021-05-08 PROCEDURE — 99285 EMERGENCY DEPT VISIT HI MDM: CPT | Performed by: EMERGENCY MEDICINE

## 2021-05-08 PROCEDURE — 36415 COLL VENOUS BLD VENIPUNCTURE: CPT | Performed by: EMERGENCY MEDICINE

## 2021-05-08 PROCEDURE — 76801 OB US < 14 WKS SINGLE FETUS: CPT

## 2021-05-08 PROCEDURE — 86900 BLOOD TYPING SEROLOGIC ABO: CPT | Performed by: EMERGENCY MEDICINE

## 2021-05-08 PROCEDURE — 96372 THER/PROPH/DIAG INJ SC/IM: CPT

## 2021-05-08 PROCEDURE — 86850 RBC ANTIBODY SCREEN: CPT | Performed by: EMERGENCY MEDICINE

## 2021-05-08 PROCEDURE — 86901 BLOOD TYPING SEROLOGIC RH(D): CPT | Performed by: EMERGENCY MEDICINE

## 2021-05-08 PROCEDURE — 99284 EMERGENCY DEPT VISIT MOD MDM: CPT

## 2021-05-08 RX ADMIN — HUMAN RHO(D) IMMUNE GLOBULIN 300 MCG: 300 INJECTION, SOLUTION INTRAMUSCULAR at 22:45

## 2021-05-08 NOTE — ED PROVIDER NOTES
History  Chief Complaint   Patient presents with    Evaluation of Abnormal Diagnostic Test     Pt referred by ultrasound technician stating no heart beat was found on external ultrasound  Pt 10 weeks pregnant  Denies complaints  Very pleasant 24-year-old female, presents with abnormal outpatient ultrasound  , patient states she is 10 weeks gestation by LMP, had a outpatient ultrasound transabdominally, they were unable to see fetal activity, patient says she has retrograde uterus and frequently they have to do transvaginal early on to assess the fetus  , otherwise no abdominal pain no abdominal cramping, no vaginal bleeding  No fevers no chills no nausea no vomiting no other modifying or alleviating symptoms  , patient says she otherwise is her normal state of health  Prior to Admission Medications   Prescriptions Last Dose Informant Patient Reported? Taking? Endometrin 100 MG vaginal insert   Yes No   Si mg Switched to 50mg/1ml Progesterone injection  Pt to stop on      Prenatal Multivit-Min-Fe-FA (PRENATAL VITAMINS PO)   Yes No   Sig: Take by mouth   cholecalciferol (VITAMIN D3) 1,000 units tablet  Self Yes No   Sig: Take 1,000 Units by mouth daily   levothyroxine 25 mcg tablet   Yes No   Sig: take 1 tablet by mouth once daily AT LEAST 30 MINUTES PRIOR TO BREAKFAST/OTHER MEDS   progesterone (PROMETRIUM) 100 MG capsule   Yes No   Sig: Take 100 mg by mouth daily   valACYclovir (VALTREX) 500 mg tablet   Yes No   Sig: Take 500 mg by mouth      Facility-Administered Medications: None       Past Medical History:   Diagnosis Date    Disease of thyroid gland     postpartum thyroiditis    Female infertility     Varicella     had chickenpox as a child       Past Surgical History:   Procedure Laterality Date     SECTION      primary for twins, B was breech    OVARIAN CYST REMOVAL Left     dermoid cyst removed, pt still has ovary    TONSILLECTOMY     [address] [place] [address]   Family History   Problem Relation Age of Onset    COPD Maternal Grandmother     Heart disease Maternal Grandfather     Heart attack Maternal Grandfather      I have reviewed and agree with the history as documented  E-Cigarette/Vaping    E-Cigarette Use Never User      E-Cigarette/Vaping Substances     Social History     Tobacco Use    Smoking status: Never Smoker    Smokeless tobacco: Never Used   Substance Use Topics    Alcohol use: No    Drug use: No       Review of Systems   Constitutional: Negative for activity change, chills, diaphoresis and fever  HENT: Negative for congestion, sinus pressure and sore throat  Eyes: Negative for pain and visual disturbance  Respiratory: Negative for cough, chest tightness, shortness of breath, wheezing and stridor  Cardiovascular: Negative for chest pain and palpitations  Gastrointestinal: Negative for abdominal distention, abdominal pain, constipation, diarrhea, nausea and vomiting  Genitourinary: Negative for dysuria and frequency  Musculoskeletal: Negative for neck pain and neck stiffness  Skin: Negative for rash  Neurological: Negative for dizziness, speech difficulty, light-headedness, numbness and headaches  Physical Exam  Physical Exam  Vitals signs reviewed  Constitutional:       General: She is not in acute distress  Appearance: She is well-developed  She is not diaphoretic  HENT:      Head: Normocephalic and atraumatic  Right Ear: External ear normal       Left Ear: External ear normal       Nose: Nose normal    Eyes:      General:         Right eye: No discharge  Left eye: No discharge  Pupils: Pupils are equal, round, and reactive to light  Neck:      Musculoskeletal: Normal range of motion and neck supple  Trachea: No tracheal deviation  Cardiovascular:      Rate and Rhythm: Normal rate and regular rhythm  Heart sounds: Normal heart sounds  No murmur     Pulmonary:      Effort: Pulmonary effort is normal  No respiratory distress  Breath sounds: Normal breath sounds  No stridor  Abdominal:      General: There is no distension  Palpations: Abdomen is soft  Tenderness: There is no abdominal tenderness  There is no guarding or rebound  Musculoskeletal: Normal range of motion  Skin:     General: Skin is warm and dry  Coloration: Skin is not pale  Findings: No erythema  Neurological:      General: No focal deficit present  Mental Status: She is alert and oriented to person, place, and time  Vital Signs  ED Triage Vitals [05/08/21 1656]   Temperature Pulse Respirations Blood Pressure SpO2   98 6 °F (37 °C) 74 18 131/73 100 %      Temp Source Heart Rate Source Patient Position - Orthostatic VS BP Location FiO2 (%)   Oral Monitor Sitting Right arm --      Pain Score       No Pain           Vitals:    05/08/21 1656   BP: 131/73   Pulse: 74   Patient Position - Orthostatic VS: Sitting         Visual Acuity      ED Medications  Medications   Rho(D) immune globulin (RHOGAM ULTRA-FILTERED PLUS) IM injection 300 mcg (has no administration in time range)       Diagnostic Studies  Results Reviewed     None                 US OB < 14 weeks with transvaginal   Final Result by Sarmad Amin MD (05/08 1844)      Single intrauterine gestation at 8 weeks 1 days (range +/- 0W5D)  Absent cardiac activity consistent with fetal demise              Workstation performed: [id number]                    Procedures  Procedures         ED Course  ED Course as of May 08 2008   Sat May 08, 2021   1911 Unfortunately patient with a 8 week fetal demise, electing to have a D and E , discussed case with OB,  they will be down to evaluate the patient to schedule for outpatient D and E                                              MDM  Number of Diagnoses or Management Options  Fetal demise due to miscarriage: new and requires workup  Diagnosis management comments: Initial ED assessment:  69-year-old female, outpatient ultrasound unable to assess fetal activity or heart rate sent in for further evaluation    Initial DDx includes but is not limited to:   Fetal demise, retrograde uterus making it difficult for transabdominal ultrasound to evaluate fetus    Initial ED plan:   Transvaginal ultrasound        Final ED summary/disposition:   After evaluation and workup in the emergency department, unfortunately found to have fetal demise, OB consult as patient is electing have an outpatient D and E  , this was set up from the emergency department  , patient to get RhoGAM in ED        Amount and/or Complexity of Data Reviewed  Clinical lab tests: ordered and reviewed  Tests in the radiology section of CPT®: ordered and reviewed  Review and summarize past medical records: yes  Discuss the patient with other providers: yes        Disposition  Final diagnoses:   Fetal demise due to miscarriage     Time reflects when diagnosis was documented in both MDM as applicable and the Disposition within this note     Time User Action Codes Description Comment    5/8/2021  7:12 PM Cole Osler Add [O03 9] Fetal demise due to miscarriage       ED Disposition     ED Disposition Condition Date/Time Comment    Discharge Stable Sat May 8, 2021  7:11 PM Merritt Kc discharge to home/self care  Follow-up Information    None         Patient's Medications   Discharge Prescriptions    No medications on file     No discharge procedures on file      PDMP Review     None          ED Provider  Electronically Signed by           Pepe Jean Baptiste DO  05/08/21 2008

## 2021-05-08 NOTE — CONSULTS
Consultation - Gynecology  Yusra Almodovar 32 y o  female MRN: [id number]  Unit/Bed#: ED 13 Encounter: [id number]      Consults      Chief Complaint   Patient presents with    Evaluation of Abnormal Diagnostic Test     Pt referred by ultrasound technician stating no heart beat was found on external ultrasound  Pt 10 weeks pregnant  Denies complaints  History of Present Illness   Physician Requesting Consult: No att  providers found  Reason for Consult / Principal Problem:  Missed   Subspeciality: General GYN  HPI: Yusra Almodovar is a 32 y o  female who presents with suspicion for missed  when she was having an outpatient transabdominal ultrasound  According to her last menstrual period 10 weeks gestation expected  Patient decline abdominal pain, vaginal bleeding, cramping  She decline fevers, chills, nausea vomiting diarrhea and constipation  Review of Systems   Constitutional: Negative  HENT: Negative  Respiratory: Negative  Cardiovascular: Negative  Gastrointestinal: Negative  Genitourinary: Negative  Musculoskeletal: Negative  Psychiatric/Behavioral: Negative          Historical Information   Past Medical History:   Diagnosis Date    Disease of thyroid gland     postpartum thyroiditis    Female infertility     Varicella     had chickenpox as a child     Past Surgical History:   Procedure Laterality Date     SECTION      primary for twins, B was breech    OVARIAN CYST REMOVAL Left     dermoid cyst removed, pt still has ovary    TONSILLECTOMY  2015    WISDOM TOOTH EXTRACTION       OB History    Para Term  AB Living   3 1 0 1 1 2   SAB TAB Ectopic Multiple Live Births   0 0 0 1 2      # Outcome Date GA Lbr Agusto/2nd Weight Sex Delivery Anes PTL Lv   3 Current            2A  20 36w3d  2660 g (5 lb 13 8 oz) M CS-LTranv Spinal Y SIL   2B  20 36w3d  2235 g (4 lb 14 8 oz) M CS-LTranv Spinal Y SIL   1 AB 2019 5w0d Complications: Chemical pregnancy     Family History   Problem Relation Age of Onset    COPD Maternal Grandmother     Heart disease Maternal Grandfather     Heart attack Maternal Grandfather      Social History   Social History     Substance and Sexual Activity   Alcohol Use No     Social History     Substance and Sexual Activity   Drug Use No     Social History     Tobacco Use   Smoking Status Never Smoker   Smokeless Tobacco Never Used       Meds/Allergies   No current facility-administered medications for this encounter  No Known Allergies    Objective   Vitals: Blood pressure 118/71, pulse 89, temperature 98 6 °F (37 °C), temperature source Oral, resp  rate 18, last menstrual period 02/16/2021, SpO2 99 %, not currently breastfeeding  There is no height or weight on file to calculate BMI  No intake or output data in the 24 hours ending 05/09/21 0129    Invasive Devices     None                 Physical Exam  Constitutional:       Appearance: Normal appearance  She is normal weight  Neck:      Musculoskeletal: Normal range of motion  Cardiovascular:      Rate and Rhythm: Normal rate  Pulses: Normal pulses  Pulmonary:      Effort: Pulmonary effort is normal    Abdominal:      Palpations: Abdomen is soft  Musculoskeletal: Normal range of motion  Skin:     General: Skin is warm  Neurological:      General: No focal deficit present  Mental Status: She is alert and oriented to person, place, and time  Psychiatric:         Mood and Affect: Mood normal          Behavior: Behavior normal          Lab Results:   Recent Results (from the past 24 hour(s))   Rhogam, other    Collection Time: 05/08/21  8:14 PM   Result Value Ref Range    ABO Grouping O     Rh Factor Negative     Antibody Screen Negative        Imaging:  Pelvic U/S    Imaging Studies: I have personally reviewed pertinent reports  EKG, Pathology, and Other Studies: I have personally reviewed pertinent reports  Assessment/Plan     Assessment:  Missed abortus  Plan:  1  Missed abortus  - TVUS was  showed 8 weeks and 1 day intrauterine pregnancy with no cardiac activity present  Ultrasound report shows fetal demise  -  I offered my condolences since about her loss  We have discussed 1st trimester pregnancy loss relationship with chromosomal abnormalities  Patient desires cytogenetic investigation  - I have discussed patient option for treatment of missed abortus which would be expectant management, medical treatment, and surgical intervention  -patient desired to receive surgical intervention  -surgical consent form and hospital disposition documents signed by patient  -I have reached Dr Giorgio Nye years and we discussed the plan and follow-up management  -patient id is understanding that she will have an outpatient dilation and a with quotation which will be scheduled by [facility] group  - patient blood type is O negative RhoGAM given with patient consent  Code Status: No Order  Advance Directive and Living Will:      Power of :    POLST:      Counseling / Coordination of Care  Total floor / unit time spent today30 minutes  minutes  Greater than 50% of total time was spent with the patient and / or family counseling and / or coordination of care   A description of the counseling / coordination of care:  Dilation and evacuation will be scheduled as outpatient surgery    Daly Duff MD  1/2/1446  6:13 AM

## 2021-05-10 NOTE — PRE-PROCEDURE INSTRUCTIONS
Pre-Surgery Instructions:   Medication Instructions    cholecalciferol (VITAMIN D3) 1,000 units tablet Instructed patient per Anesthesia Guidelines   Endometrin 100 MG vaginal insert Instructed patient per Anesthesia Guidelines   Prenatal Multivit-Min-Fe-FA (PRENATAL VITAMINS PO) Instructed patient per Anesthesia Guidelines   progesterone (PROMETRIUM) 100 MG capsule Instructed patient per Anesthesia Guidelines  Pt verbalizes understanding of med instructions & CHG bathing

## 2021-05-11 ENCOUNTER — HOSPITAL ENCOUNTER (OUTPATIENT)
Facility: AMBULARY SURGERY CENTER | Age: 26
Setting detail: OUTPATIENT SURGERY
Discharge: HOME | End: 2021-05-11
Attending: OBSTETRICS & GYNECOLOGY | Admitting: OBSTETRICS & GYNECOLOGY
Payer: COMMERCIAL

## 2021-05-11 ENCOUNTER — ANESTHESIA (OUTPATIENT)
Dept: PERIOP | Facility: AMBULARY SURGERY CENTER | Age: 26
End: 2021-05-11
Payer: COMMERCIAL

## 2021-05-11 ENCOUNTER — ANESTHESIA EVENT (OUTPATIENT)
Dept: PERIOP | Facility: AMBULARY SURGERY CENTER | Age: 26
End: 2021-05-11
Payer: COMMERCIAL

## 2021-05-11 VITALS
DIASTOLIC BLOOD PRESSURE: 84 MMHG | SYSTOLIC BLOOD PRESSURE: 115 MMHG | RESPIRATION RATE: 18 BRPM | OXYGEN SATURATION: 100 % | HEART RATE: 67 BPM | TEMPERATURE: 98 F | WEIGHT: 178 LBS | HEIGHT: 66 IN | BODY MASS INDEX: 28.61 KG/M2

## 2021-05-11 DIAGNOSIS — O02.1 MISSED ABORTION: Primary | ICD-10-CM

## 2021-05-11 PROBLEM — Z98.890 PONV (POSTOPERATIVE NAUSEA AND VOMITING): Status: ACTIVE | Noted: 2021-05-11

## 2021-05-11 PROBLEM — R11.2 PONV (POSTOPERATIVE NAUSEA AND VOMITING): Status: ACTIVE | Noted: 2021-05-11

## 2021-05-11 PROCEDURE — 59820 CARE OF MISCARRIAGE: CPT | Performed by: OBSTETRICS & GYNECOLOGY

## 2021-05-11 PROCEDURE — 88305 TISSUE EXAM BY PATHOLOGIST: CPT | Performed by: PATHOLOGY

## 2021-05-11 PROCEDURE — NC001 PR NO CHARGE: Performed by: OBSTETRICS & GYNECOLOGY

## 2021-05-11 RX ORDER — ONDANSETRON 2 MG/ML
INJECTION INTRAMUSCULAR; INTRAVENOUS AS NEEDED
Status: DISCONTINUED | OUTPATIENT
Start: 2021-05-11 | End: 2021-05-11

## 2021-05-11 RX ORDER — KETOROLAC TROMETHAMINE 30 MG/ML
INJECTION, SOLUTION INTRAMUSCULAR; INTRAVENOUS AS NEEDED
Status: DISCONTINUED | OUTPATIENT
Start: 2021-05-11 | End: 2021-05-11

## 2021-05-11 RX ORDER — METHYLERGONOVINE MALEATE 0.2 MG/1
0.2 TABLET ORAL EVERY 6 HOURS
Qty: 12 TABLET | Refills: 0 | Status: SHIPPED | OUTPATIENT
Start: 2021-05-11 | End: 2021-05-28

## 2021-05-11 RX ORDER — PROPOFOL 10 MG/ML
INJECTION, EMULSION INTRAVENOUS CONTINUOUS PRN
Status: DISCONTINUED | OUTPATIENT
Start: 2021-05-11 | End: 2021-05-11

## 2021-05-11 RX ORDER — PROPOFOL 10 MG/ML
INJECTION, EMULSION INTRAVENOUS AS NEEDED
Status: DISCONTINUED | OUTPATIENT
Start: 2021-05-11 | End: 2021-05-11

## 2021-05-11 RX ORDER — MIDAZOLAM HYDROCHLORIDE 2 MG/2ML
INJECTION, SOLUTION INTRAMUSCULAR; INTRAVENOUS AS NEEDED
Status: DISCONTINUED | OUTPATIENT
Start: 2021-05-11 | End: 2021-05-11

## 2021-05-11 RX ORDER — ACETAMINOPHEN 325 MG/1
650 TABLET ORAL EVERY 6 HOURS PRN
Refills: 0
Start: 2021-05-11 | End: 2021-05-28

## 2021-05-11 RX ORDER — LIDOCAINE HYDROCHLORIDE 10 MG/ML
INJECTION, SOLUTION EPIDURAL; INFILTRATION; INTRACAUDAL; PERINEURAL AS NEEDED
Status: DISCONTINUED | OUTPATIENT
Start: 2021-05-11 | End: 2021-05-11

## 2021-05-11 RX ORDER — FENTANYL CITRATE 50 UG/ML
INJECTION, SOLUTION INTRAMUSCULAR; INTRAVENOUS AS NEEDED
Status: DISCONTINUED | OUTPATIENT
Start: 2021-05-11 | End: 2021-05-11

## 2021-05-11 RX ORDER — ONDANSETRON 2 MG/ML
4 INJECTION INTRAMUSCULAR; INTRAVENOUS ONCE AS NEEDED
Status: DISCONTINUED | OUTPATIENT
Start: 2021-05-11 | End: 2021-05-11 | Stop reason: HOSPADM

## 2021-05-11 RX ORDER — FENTANYL CITRATE/PF 50 MCG/ML
50 SYRINGE (ML) INJECTION
Status: DISCONTINUED | OUTPATIENT
Start: 2021-05-11 | End: 2021-05-11 | Stop reason: HOSPADM

## 2021-05-11 RX ORDER — ONDANSETRON 2 MG/ML
4 INJECTION INTRAMUSCULAR; INTRAVENOUS ONCE
Status: COMPLETED | OUTPATIENT
Start: 2021-05-11 | End: 2021-05-11

## 2021-05-11 RX ORDER — DEXAMETHASONE SODIUM PHOSPHATE 10 MG/ML
INJECTION, SOLUTION INTRAMUSCULAR; INTRAVENOUS AS NEEDED
Status: DISCONTINUED | OUTPATIENT
Start: 2021-05-11 | End: 2021-05-11

## 2021-05-11 RX ORDER — ACETAMINOPHEN 325 MG/1
650 TABLET ORAL EVERY 6 HOURS PRN
Status: DISCONTINUED | OUTPATIENT
Start: 2021-05-11 | End: 2021-05-11 | Stop reason: HOSPADM

## 2021-05-11 RX ORDER — IBUPROFEN 600 MG/1
600 TABLET ORAL EVERY 6 HOURS PRN
Qty: 30 TABLET | Refills: 0
Start: 2021-05-11 | End: 2021-05-28

## 2021-05-11 RX ORDER — SODIUM CHLORIDE, SODIUM LACTATE, POTASSIUM CHLORIDE, CALCIUM CHLORIDE 600; 310; 30; 20 MG/100ML; MG/100ML; MG/100ML; MG/100ML
125 INJECTION, SOLUTION INTRAVENOUS CONTINUOUS
Status: DISCONTINUED | OUTPATIENT
Start: 2021-05-11 | End: 2021-05-11 | Stop reason: HOSPADM

## 2021-05-11 RX ORDER — DOXYCYCLINE HYCLATE 100 MG/1
200 CAPSULE ORAL ONCE
Status: COMPLETED | OUTPATIENT
Start: 2021-05-11 | End: 2021-05-11

## 2021-05-11 RX ADMIN — PROPOFOL 100 MG: 10 INJECTION, EMULSION INTRAVENOUS at 13:00

## 2021-05-11 RX ADMIN — ONDANSETRON 4 MG: 2 INJECTION INTRAMUSCULAR; INTRAVENOUS at 12:57

## 2021-05-11 RX ADMIN — PROPOFOL 100 MG: 10 INJECTION, EMULSION INTRAVENOUS at 13:02

## 2021-05-11 RX ADMIN — DEXAMETHASONE SODIUM PHOSPHATE 4 MG: 10 INJECTION, SOLUTION INTRAMUSCULAR; INTRAVENOUS at 12:57

## 2021-05-11 RX ADMIN — DOXYCYCLINE 200 MG: 100 CAPSULE ORAL at 12:35

## 2021-05-11 RX ADMIN — ACETAMINOPHEN 650 MG: 325 TABLET, FILM COATED ORAL at 14:22

## 2021-05-11 RX ADMIN — SODIUM CHLORIDE, SODIUM LACTATE, POTASSIUM CHLORIDE, AND CALCIUM CHLORIDE: .6; .31; .03; .02 INJECTION, SOLUTION INTRAVENOUS at 12:51

## 2021-05-11 RX ADMIN — PROPOFOL 200 MG: 10 INJECTION, EMULSION INTRAVENOUS at 12:57

## 2021-05-11 RX ADMIN — MIDAZOLAM HYDROCHLORIDE 2 MG: 1 INJECTION, SOLUTION INTRAMUSCULAR; INTRAVENOUS at 12:52

## 2021-05-11 RX ADMIN — LIDOCAINE HYDROCHLORIDE 100 MG: 10 INJECTION, SOLUTION EPIDURAL; INFILTRATION; INTRACAUDAL at 12:57

## 2021-05-11 RX ADMIN — KETOROLAC TROMETHAMINE 30 MG: 30 INJECTION, SOLUTION INTRAMUSCULAR at 13:15

## 2021-05-11 RX ADMIN — ONDANSETRON 4 MG: 2 INJECTION INTRAMUSCULAR; INTRAVENOUS at 12:35

## 2021-05-11 RX ADMIN — FENTANYL CITRATE 100 MCG: 50 INJECTION, SOLUTION INTRAMUSCULAR; INTRAVENOUS at 13:04

## 2021-05-11 RX ADMIN — PROPOFOL 150 MCG/KG/MIN: 10 INJECTION, EMULSION INTRAVENOUS at 12:57

## 2021-05-11 NOTE — DISCHARGE INSTRUCTIONS
Dilation and Curettage   WHAT YOU SHOULD KNOW:   Dilation and curettage (D and C) is a procedure to remove tissue from the lining of your uterus  INSTRUCTIONS:   Medicines:   · Pain medicine: You may be given medicine to take away or decrease pain  Do not wait until the pain is severe before you take your medicine  · Antibiotics: This medicine will help fight or prevent an infection  · Take your medicine as directed  Call your healthcare provider if you think your medicine is not helping or if you have side effects  Tell him if you are allergic to any medicine  Keep a list of the medicines, vitamins, and herbs you take  Include the amounts, and when and why you take them  Bring the list or the pill bottles to follow-up visits  Carry your medicine list with you in case of an emergency  Follow up with your healthcare provider as directed:  Write down your questions so you remember to ask them during your visits  Activity:  Ask your primary healthcare provider when you can return to your normal activities  Contact your primary healthcare provider if:   · You have foul-smelling vaginal discharge  · You do not get your monthly period  · You feel depressed or anxious  · You feel very tired and weak  · You have questions or concerns about your condition or care  Return to the emergency department if:   · You have heavy vaginal bleeding that soaks 1 pad in 1 hour for 2 hours in a row  · You have a fever and abdominal cramps  · Your pain does not get better, even after treatment  © 2014 [employer] is for End User's use only and may not be sold, redistributed or otherwise used for commercial purposes  All illustrations and images included in CareNotes® are the copyrighted property of [employer]  or [employer]per Chacon  The above information is an  only  It is not intended as medical advice for individual conditions or treatments   Talk to your doctor, nurse or pharmacist before following any medical regimen to see if it is safe and effective for you

## 2021-05-11 NOTE — OP NOTE
OPERATIVE REPORT  PATIENT NAME: Johnson Ferrell    :  1995  MRN: [id number]  Pt Location: AN  OR ROOM 05    SURGERY DATE: 2021    Surgeon(s) and Role:     * Mandy Webber MD - Primary    Preop Diagnosis:  Missed  [O02 1]    Post-Op Diagnosis Codes:     * Missed  [O02 1]    Procedure(s) (LRB):  DILATATION AND EVACUATION (D&E) (8 WEEKS) (N/A)    Specimen(s):  ID Type Source Tests Collected by Time Destination   1 : Products of Conception Tissue Products of Conception CHROMOSOME ANALYSIS, PRODUCTS OF CONCEPTION, TISSUE EXAM Mandy Webber MD 2021 1306        Estimated Blood Loss:   Minimal    Drains:  * No LDAs found *    Anesthesia Type:   Choice    Operative Indications:  Missed  [O02 1]      Operative Findings:  Eight week size uterus   Normal appearing external genitalia   Normal appearing cervix    Complications:   None    Procedure and Technique:  Description of Procedure:   Patient was taken to the operating room were a time out was performed to confirm correct patient and correct procedure  General LMA anesthesia (LMA) was administered and the patient was positioned on the OR table in the dorsal lithotomy position  All pressure points were padded and a kendrick hugger was placed to maintain control of core body temperature  A bimanual exam was performed and the uterus was noted to be midposition, 8 week in size and consistency with no palpable adnexal masses or fullness  The patient was prepped and draped in the usual sterile fashion  Operative Technique:  A weighted speculum was inserted into vagina  A Harshal retractor was used to visualize the anterior lip of the cervix which was grasped with a single tooth tenaculum  The cervix was then dilated with guillen dilators for the introduction of the 8mm suction curette   The suction curette was advanced to the uterine fundus and then suction applied and the curette was rotated in a circular fashion as the curette was withdrawn  The suction was relieved at the internal os and the curette was again advanced to the uterine fundus  The uterus was curetted with a sharp curette in a systematic manner covering all surfaces until a gritty texture was noted throughout  The suction curette was introduced one final time and the uterine cavity was cleared of any remaining products of curettings  The suction curette was removed and the single tooth tenaculum was removed from the anterior lip of the cervix  Good hemostasis was noted at the tenaculum puncture sites  The weighted speculum was then removed from the vagina  At the end of the procedure, all needle, sponge, and instrument counts were noted to be correct x2  The patient tolerated the procedure well and was transferred to the recovery room in stable condition        I was present for the entire procedure    Patient Disposition:  extubated and stable    SIGNATURE: Mozella Burkitt, MD  DATE: May 11, 2021  TIME: 1:30 PM

## 2021-05-11 NOTE — ANESTHESIA POSTPROCEDURE EVALUATION
Post-Op Assessment Note    CV Status:  Stable  Pain Score: 0    Pain management: adequate     Mental Status:  Awake and alert   Hydration Status:  Stable   PONV Controlled:  None   Airway Patency:  Patent and adequate      Post Op Vitals Reviewed: Yes      Staff: CRNA, Anesthesiologist         No complications documented      BP   101/58   Temp   97 2   Pulse  72   Resp   14   SpO2   100%

## 2021-05-11 NOTE — ANESTHESIA PREPROCEDURE EVALUATION
Procedure:  DILATATION AND EVACUATION (D&E) (8 WEEKS) (N/A Uterus)    Relevant Problems   ANESTHESIA   (+) PONV (postoperative nausea and vomiting)        Physical Exam    Airway    Mallampati score: I  TM Distance: >3 FB  Neck ROM: full     Dental   No notable dental hx     Cardiovascular  Cardiovascular exam normal    Pulmonary  Pulmonary exam normal     Other Findings        Anesthesia Plan  ASA Score- 2     Anesthesia Type- general with ASA Monitors  Additional Monitors:   Airway Plan: LMA  Plan Factors-Exercise tolerance (METS): >4 METS  Chart reviewed  Patient summary reviewed  Patient is not a current smoker  Patient did not smoke on day of surgery  Induction- intravenous  Postoperative Plan- Plan for postoperative opioid use  Informed Consent- Anesthetic plan and risks discussed with patient  I personally reviewed this patient with the CRNA  Discussed and agreed on the Anesthesia Plan with the CRNA  Sajan Pyle

## 2021-05-11 NOTE — H&P
Assessment /Plan:     32 y o  [id number] with Patient's last menstrual period was 2021 (exact date)  with missed   for suction D&E   The risks (infection, bleeding, transfusion, damage to adjacent organs requiring immediate and/or delayed repair, clots inside blood vessels, need to complete procedure using alternative approach, procedural failure, worsening of pre-exisiting medical condition, and death) and alternatives  have been discussed and patient desires to proceed with suction D&E at [facility] on 2021  Consent was reviewed in detail and signed today  Preoperative testing and antibiotics were discussed   Postoperative course discussed and post op medications were reviewed      Chief Complaint:Miscarriage    HPI:  Pt is a 32 y o  [id number] with Patient's last menstrual period was 2021 (exact date)  who was found incidentally to have a missed  during her dating scan    She denies bleeding and cramping  PMHx:   Past Medical History:   Diagnosis Date    Disease of thyroid gland     postpartum thyroiditis    Female infertility     PONV (postoperative nausea and vomiting)     Varicella     had chickenpox as a child       PSHx:   Past Surgical History:   Procedure Laterality Date     SECTION      primary for twins, B was breech    OVARIAN CYST REMOVAL Left     dermoid cyst removed, pt still has ovary    TONSILLECTOMY  2015    WISDOM TOOTH EXTRACTION         Meds:   Medications Prior to Admission   Medication    cholecalciferol (VITAMIN D3) 1,000 units tablet    Endometrin 100 MG vaginal insert    Prenatal Multivit-Min-Fe-FA (PRENATAL VITAMINS PO)    progesterone (PROMETRIUM) 100 MG capsule       Allergies: No Known Allergies    Social Hx:    Social History     Socioeconomic History    Marital status: /Civil Union     Spouse name: None    Number of children: None    Years of education: None    Highest education level: None   Occupational History    None Social Needs    Financial resource strain: None    Food insecurity     Worry: None     Inability: None    Transportation needs     Medical: None     Non-medical: None   Tobacco Use    Smoking status: Never Smoker    Smokeless tobacco: Never Used   Substance and Sexual Activity    Alcohol use: No    Drug use: No    Sexual activity: Yes     Partners: Male     Birth control/protection: None   Lifestyle    Physical activity     Days per week: None     Minutes per session: None    Stress: None   Relationships    Social connections     Talks on phone: None     Gets together: None     Attends Evangelical service: None     Active member of club or organization: None     Attends meetings of clubs or organizations: None     Relationship status: None    Intimate partner violence     Fear of current or ex partner: None     Emotionally abused: None     Physically abused: None     Forced sexual activity: None   Other Topics Concern    None   Social History Narrative    None       Ob Hx:   OB History    Para Term  AB Living   3 1 0 1 1 2   SAB TAB Ectopic Multiple Live Births   0 0 0 1 2      # Outcome Date GA Lbr Agusto/2nd Weight Sex Delivery Anes PTL Lv   3 Current            2A  20 36w3d  2660 g (5 lb 13 8 oz) M CS-LTranv Spinal Y SIL   2B  20 36w3d  2235 g (4 lb 14 8 oz) M CS-LTranv Spinal Y SIL   1 AB 2019 5w0d             Complications: Chemical pregnancy       Gyn HX:  infertility    Fm Hx:   Family History   Problem Relation Age of Onset    COPD Maternal Grandmother     Heart disease Maternal Grandfather     Heart attack Maternal Grandfather        ROS:  Review of Systems   Constitutional: Negative  Respiratory: Negative  Cardiovascular: Negative  Gastrointestinal: Negative  Genitourinary: Negative  Psychiatric/Behavioral: Negative          VS:  /65   Pulse 61   Temp 98 6 °F (37 °C) (Temporal)   Resp 18   Ht 5' 6" (1 676 m)   Wt 80 7 kg (178 lb)   LMP 02/16/2021 (Exact Date)   SpO2 100%   BMI 28 73 kg/m²       Exam:    Physical Exam  Vitals signs and nursing note reviewed  Constitutional:       Appearance: Normal appearance  She is normal weight  HENT:      Head: Normocephalic and atraumatic  Eyes:      Extraocular Movements: Extraocular movements intact  Conjunctiva/sclera: Conjunctivae normal       Pupils: Pupils are equal, round, and reactive to light  Cardiovascular:      Rate and Rhythm: Normal rate and regular rhythm  Heart sounds: Normal heart sounds  No murmur  Pulmonary:      Effort: Pulmonary effort is normal  No respiratory distress  Breath sounds: Normal breath sounds  No wheezing or rales  Abdominal:      General: Abdomen is flat  Palpations: Abdomen is soft  Neurological:      General: No focal deficit present  Mental Status: She is alert and oriented to person, place, and time     Psychiatric:         Mood and Affect: Mood normal          Behavior: Behavior normal

## 2021-05-17 ENCOUNTER — TELEPHONE (OUTPATIENT)
Dept: OBGYN CLINIC | Facility: CLINIC | Age: 26
End: 2021-05-17

## 2021-05-17 NOTE — TELEPHONE ENCOUNTER
Pt requested that her records be sent to [facility]- she is going to start treatment there for conception  Records faxed to [facility]  ([phone]

## 2021-05-20 ENCOUNTER — TELEPHONE (OUTPATIENT)
Dept: OBGYN CLINIC | Facility: CLINIC | Age: 26
End: 2021-05-20

## 2021-05-20 NOTE — TELEPHONE ENCOUNTER
Pt Lm on nurse line with test results questions  RC and spoke with patient  Pt would like to know if the products of conception from her d&e 5/11 are resulted  Advised patient we are still waiting for results  We will call her once we get results  Verbalized understanding

## 2021-05-28 ENCOUNTER — OFFICE VISIT (OUTPATIENT)
Dept: OBGYN CLINIC | Facility: CLINIC | Age: 26
End: 2021-05-28

## 2021-05-28 VITALS — WEIGHT: 177 LBS | BODY MASS INDEX: 28.57 KG/M2 | SYSTOLIC BLOOD PRESSURE: 118 MMHG | DIASTOLIC BLOOD PRESSURE: 70 MMHG

## 2021-05-28 DIAGNOSIS — Z09 POSTOPERATIVE EXAMINATION: Primary | ICD-10-CM

## 2021-05-28 PROBLEM — Z98.891 S/P REPEAT LOW TRANSVERSE C-SECTION: Status: ACTIVE | Noted: 2020-06-17

## 2021-05-28 PROCEDURE — 99024 POSTOP FOLLOW-UP VISIT: CPT | Performed by: OBSTETRICS & GYNECOLOGY

## 2021-05-28 NOTE — PROGRESS NOTES
Shantel Amezcua is a 32 y o  female who presents to the clinic 2 weeks status post suction D&C for missed   Eating a regular diet without difficulty  Bowel movements are normal  The patient is not having any pain  She has had a regular period since the procedure    The following portions of the patient's history were reviewed and updated as appropriate: allergies, current medications, past family history, past medical history, past social history, past surgical history and problem list     Review of Systems  Pertinent items are noted in HPI  Objective     /70   Wt 80 3 kg (177 lb)   LMP 2021 (Exact Date)   Breastfeeding No   BMI 28 57 kg/m²   Breast Rt breast, <1cm small mobile, tender lump upper outer quadrant   General:  alert and oriented, in no acute distress   Assessment      Doing well postoperatively  Operative findings again reviewed  Pathology report discussed  Genetic testing - pending    Plan     1  May attempt preg at any point  2  Breast - likely hormonal - monitor, if it does not resolve with next cycle, recommend ultrasound  3  Activity restrictions: none  4  Anticipated return to work: now

## 2021-06-08 LAB
CHROMOSOME BLD/T: NORMAL
CLINICAL CYTOGENETICIST SPEC: NORMAL
DIAGNOSTIC IMP SPEC-IMP: NORMAL
SL AMB # OF GENOTYPING TARGETS: NORMAL
SL AMB ARRAY TYPE: NORMAL
SPECIMEN SOURCE: NORMAL

## 2021-12-13 ENCOUNTER — INITIAL PRENATAL (OUTPATIENT)
Dept: OBGYN CLINIC | Facility: CLINIC | Age: 26
End: 2021-12-13

## 2021-12-13 VITALS
HEIGHT: 66 IN | WEIGHT: 197.2 LBS | BODY MASS INDEX: 31.69 KG/M2 | SYSTOLIC BLOOD PRESSURE: 120 MMHG | DIASTOLIC BLOOD PRESSURE: 68 MMHG

## 2021-12-13 DIAGNOSIS — Z34.92 SECOND TRIMESTER PREGNANCY: ICD-10-CM

## 2021-12-13 DIAGNOSIS — Z3A.24 24 WEEKS GESTATION OF PREGNANCY: Primary | ICD-10-CM

## 2021-12-13 PROCEDURE — OBC

## 2021-12-16 ENCOUNTER — ROUTINE PRENATAL (OUTPATIENT)
Dept: OBGYN CLINIC | Facility: CLINIC | Age: 26
End: 2021-12-16

## 2021-12-16 VITALS — SYSTOLIC BLOOD PRESSURE: 118 MMHG | WEIGHT: 199 LBS | BODY MASS INDEX: 32.12 KG/M2 | DIASTOLIC BLOOD PRESSURE: 70 MMHG

## 2021-12-16 DIAGNOSIS — O34.219 DESIRES VBAC (VAGINAL BIRTH AFTER CESAREAN) TRIAL: ICD-10-CM

## 2021-12-16 DIAGNOSIS — Z3A.24 24 WEEKS GESTATION OF PREGNANCY: ICD-10-CM

## 2021-12-16 DIAGNOSIS — Z87.59 HISTORY OF TWIN PREGNANCY IN PRIOR PREGNANCY: ICD-10-CM

## 2021-12-16 DIAGNOSIS — O09.892 HISTORY OF PRETERM DELIVERY, CURRENTLY PREGNANT IN SECOND TRIMESTER: Primary | ICD-10-CM

## 2021-12-16 DIAGNOSIS — Z98.891 HISTORY OF CESAREAN SECTION, LOW TRANSVERSE: ICD-10-CM

## 2021-12-16 PROCEDURE — PNV: Performed by: PHYSICIAN ASSISTANT

## 2021-12-28 ENCOUNTER — TELEPHONE (OUTPATIENT)
Dept: OBGYN CLINIC | Facility: CLINIC | Age: 26
End: 2021-12-28

## 2022-01-03 ENCOUNTER — TELEPHONE (OUTPATIENT)
Dept: OBGYN CLINIC | Facility: CLINIC | Age: 27
End: 2022-01-03

## 2022-01-03 NOTE — TELEPHONE ENCOUNTER
Patient called stating she has been very light headed and dizzy today  She can barely stand up and walk or bend over  She had covid last week but has been asymptomatic since 12/29  She is drinking plenty of fluids and eating well  She doesn't believe it is dehydration  She took her BP this morning and it was 94/54, around 11 she repeated it and it was 110/83

## 2022-01-08 LAB
ABO GROUP BLD: NORMAL
BASOPHILS # BLD AUTO: 0 X10E3/UL (ref 0–0.2)
BASOPHILS NFR BLD AUTO: 0 %
BLD GP AB SCN SERPL QL: NORMAL
BLOOD GROUP ANTIBODIES SERPL: ABNORMAL
EOSINOPHIL # BLD AUTO: 0 X10E3/UL (ref 0–0.4)
EOSINOPHIL NFR BLD AUTO: 1 %
ERYTHROCYTE [DISTWIDTH] IN BLOOD BY AUTOMATED COUNT: 11.8 % (ref 11.7–15.4)
GLUCOSE 1H P 50 G GLC PO SERPL-MCNC: 96 MG/DL (ref 65–139)
HCT VFR BLD AUTO: 37.3 % (ref 34–46.6)
HCV AB S/CO SERPL IA: <0.1 S/CO RATIO (ref 0–0.9)
HGB BLD-MCNC: 12.7 G/DL (ref 11.1–15.9)
IMM GRANULOCYTES # BLD: 0 X10E3/UL (ref 0–0.1)
IMM GRANULOCYTES NFR BLD: 1 %
LYMPHOCYTES # BLD AUTO: 1.9 X10E3/UL (ref 0.7–3.1)
LYMPHOCYTES NFR BLD AUTO: 30 %
MCH RBC QN AUTO: 30.5 PG (ref 26.6–33)
MCHC RBC AUTO-ENTMCNC: 34 G/DL (ref 31.5–35.7)
MCV RBC AUTO: 89 FL (ref 79–97)
MONOCYTES # BLD AUTO: 0.4 X10E3/UL (ref 0.1–0.9)
MONOCYTES NFR BLD AUTO: 6 %
NEUTROPHILS # BLD AUTO: 4.1 X10E3/UL (ref 1.4–7)
NEUTROPHILS NFR BLD AUTO: 62 %
PLATELET # BLD AUTO: 188 X10E3/UL (ref 150–450)
RBC # BLD AUTO: 4.17 X10E6/UL (ref 3.77–5.28)
RH BLD: NEGATIVE
RPR SER QL: NON REACTIVE
SL AMB ANTIBODY SCREEN: POSITIVE
WBC # BLD AUTO: 6.5 X10E3/UL (ref 3.4–10.8)
XXX BLOOD GROUP AB TITR SERPL AHG: ABNORMAL {TITER}

## 2022-01-11 ENCOUNTER — ROUTINE PRENATAL (OUTPATIENT)
Dept: PERINATAL CARE | Facility: OTHER | Age: 27
End: 2022-01-11
Payer: COMMERCIAL

## 2022-01-11 VITALS
WEIGHT: 205.2 LBS | HEIGHT: 66 IN | HEART RATE: 88 BPM | SYSTOLIC BLOOD PRESSURE: 119 MMHG | BODY MASS INDEX: 32.98 KG/M2 | DIASTOLIC BLOOD PRESSURE: 77 MMHG

## 2022-01-11 DIAGNOSIS — O09.813 PREGNANCY RESULTING FROM IN VITRO FERTILIZATION IN THIRD TRIMESTER: Primary | ICD-10-CM

## 2022-01-11 DIAGNOSIS — Z3A.24 24 WEEKS GESTATION OF PREGNANCY: ICD-10-CM

## 2022-01-11 DIAGNOSIS — Z3A.28 28 WEEKS GESTATION OF PREGNANCY: ICD-10-CM

## 2022-01-11 DIAGNOSIS — O34.219 PREGNANCY WITH HISTORY OF CESAREAN SECTION, ANTEPARTUM: ICD-10-CM

## 2022-01-11 DIAGNOSIS — U07.1 COVID-19 AFFECTING PREGNANCY IN SECOND TRIMESTER: ICD-10-CM

## 2022-01-11 DIAGNOSIS — O98.512 COVID-19 AFFECTING PREGNANCY IN SECOND TRIMESTER: ICD-10-CM

## 2022-01-11 PROCEDURE — 76811 OB US DETAILED SNGL FETUS: CPT | Performed by: OBSTETRICS & GYNECOLOGY

## 2022-01-11 PROCEDURE — 76827 ECHO EXAM OF FETAL HEART: CPT | Performed by: OBSTETRICS & GYNECOLOGY

## 2022-01-11 PROCEDURE — 76825 ECHO EXAM OF FETAL HEART: CPT | Performed by: OBSTETRICS & GYNECOLOGY

## 2022-01-11 PROCEDURE — 93325 DOPPLER ECHO COLOR FLOW MAPG: CPT | Performed by: OBSTETRICS & GYNECOLOGY

## 2022-01-11 PROCEDURE — 99212 OFFICE O/P EST SF 10 MIN: CPT | Performed by: OBSTETRICS & GYNECOLOGY

## 2022-01-11 NOTE — LETTER
2022     Huong Montes DO  [address]  [address] [facility]    Patient: Rosie Silverio   YOB: 1995   Date of Visit: 2022       Dear Dr Sherlyn Henriquez:    Thank you for referring Rosie Silverio to me for evaluation  Below are my notes for this consultation  If you have questions, please do not hesitate to call me  I look forward to following your patient along with you  Sincerely,        Jacquelin Concepcion MD        CC: No Recipients  Jacquelin Concepcion MD  2022  9:12 PM  Incomplete  OFFICE [facility]  [address]     Dear Dr Sherlyn Henriquez     Thank you for requesting a  consultation on your patient Rosie Silverio for the following indications:  Recent transfer of care from [place]  She is an IVF pregnancy status post transfer on 21 and reports a due date of 22  This pregnancy had preimplantation genetics completed that was normal and a normal NIPT result by her report  Pregnancy complications include some bleeding on 21 at which time RhoGAM was given and she developed covid 2 weeks ago  She is a CF carrier and the father is a carrier for alpha thalassemia but they do not share the same carrier state  She had a history of a prior indicated  for a twin pregnancy 20 at 36 weeks and 3 days as her prior ob recommended a delivery prior to 37 weeks because of her mono amniotic dichorionic twins  She had a  because twin B was breech  She declined both her flu shot and her COVID vaccine in this pregnancy    History  Past medical history:  Postpartum thyroiditis that resolved and infertility  A TSH in this pregnancy was normal   Past surgical history:   Tonsillectomy, wisdom tooth extraction, , dermoid cyst removal from her ovary and 5 week D/E  Medications:  Prenatal vitamins and vitamin-D  Allergies to medications:  None  Past obstetrical history:   2019 she had a 5 week chemical pregnancy  20 at 36 weeks and 3 days she had an indicated low-transverse  for twin males at [facility] weighing 5 pounds 13 ounces and 4 pounds 14 ounces  Indication was monoamniotic/diamniotic twin pregnancy  Baby B was breech  2021 D/E for and 8 week miscarriage  The fetus was diagnosed with triploidy  Social history:  Denies substance use  First generation family history:  Noncontributory    Ultrasound findings:  Normal interval fetal growth and fluid are seen  The anatomical survey appears normal  Some anatomy is limited secondary to fetal position  A fetal echo appears normal       The patient was informed of the findings and counseled about the limitations of the exam in detecting all forms of fetal congenital abnormalities  She does not report any vaginal bleeding or uterine cramping/contractions  She does feel fetal movement  Follow up recommended:   Recommend a follow-up ultrasound in 6 weeks for growth and missed anatomy  Recommend starting weekly NST/fluid scans at 36 weeks  The majority of time (greater then 50%) was spent on counseling and coordination of care of this patient and /or family member  Approximate face to face time was 15 minutes  MD Gracie Mariscal MD  2022 10:23 AM  Incomplete  OFFICE [facility], [address],  [address] [place] [address]     Dear Dr Seb Hooper     Thank you for requesting a  consultation on your patient Sravanthi Lewis for the following indications:  Recent transfer of care from [place]  She is an IVF pregnancy status post transfer on 21 and reports her due date of 22  Pregnancy complications include some bleeding on  at which time RhoGAM was given  She is a CF carrier and the father the baby is negative  She had a history of a prior  for a twin pregnancy 20 at 36 weeks and 3 days    She declined both her flu shot and her COVID vaccine in this pregnancy    History  Past medical history:  Postpartum thyroiditis and infertility  Past surgical history: Tonsillectomy, wisdom tooth extraction, , dermoid cyst removal from her ovary and 8 week DNE  Medications: ***  Allergies to medications:  None  Past obstetrical history:  20 at 36 weeks and 3 days she had a low-transverse  for twin males at [facility] weighing 5 pounds 13 ounces and 4 pounds 14 ounces  Indication was baby B was breech  On 2019 she had a 5 week chemical pregnancy  Social history:  Denies substance use  First generation family history:  Noncontributory    Ultrasound findings:  Normal interval fetal growth and fluid are seen  The anatomical survey appears normal   A fetal echo appears normal      The patient was informed of the findings and counseled about the limitations of the exam in detecting all forms of fetal congenital abnormalities  She does not report any vaginal bleeding or uterine cramping/contractions  She does feel fetal movement  Follow up recommended:   Recommend a follow-up ultrasound in 6 weeks for growth  Recommend starting weekly NST/fluid scans at 36 weeks  The majority of time (greater then 50%) was spent on counseling and coordination of care of this patient and /or family member  Approximate face to face time was 15 minutes            Nicolas Shipman MD

## 2022-01-11 NOTE — RESULT ENCOUNTER NOTE
Congratulations! You have passed your glucose test  Your blood count looks good  Your antibody screen is positive, for anti-d, and this may be related to the Rhogam you received in late November  We will repeat this on admission to the hospital for delivery  Please contact the office at [phone] with any questions  Congratulations! You have passed your glucose test  Your blood count looks good  Please contact the office at [phone] with any questions

## 2022-01-11 NOTE — LETTER
2022     DO [address]IS [address]  [address] [facility]    Patient: Yusra Almodovar   YOB: 1995   Date of Visit: 2022       Dear Dr Jhonatan Villarreal:    Thank you for referring Yusra Almodovar to me for evaluation  Below are my notes for this consultation  If you have questions, please do not hesitate to call me  I look forward to following your patient along with you  Sincerely,        Geoff Sainz MD        CC: No Recipients  Geoff Sainz MD  2022  9:13 PM  Sign when Signing Visit  OFFICE [address],  [address] [place] [address]     Dear Dr Jhonatan Villarreal     Thank you for requesting a  consultation on your patient Yusra Almodovar for the following indications:  Recent transfer of care from [place]  She is an IVF pregnancy status post transfer on 21 and reports a due date of 22  This pregnancy had preimplantation genetics completed that was normal and a normal NIPT result by her report  Pregnancy complications include some bleeding on 21 at which time RhoGAM was given and she developed covid 2 weeks ago  She is a CF carrier and the father is a carrier for alpha thalassemia but they do not share the same carrier state  She had a history of a prior indicated  for a twin pregnancy 20 at 36 weeks and 3 days as her prior ob recommended a delivery prior to 37 weeks because of her mono amniotic dichorionic twins  She had a  because twin B was breech  She declined both her flu shot and her COVID vaccine in this pregnancy    History  Past medical history:  Postpartum thyroiditis that resolved and infertility  A TSH in this pregnancy was normal   Past surgical history:   Tonsillectomy, wisdom tooth extraction, , dermoid cyst removal from her ovary and 5 week D/E  Medications:  Prenatal vitamins and vitamin-D  Allergies to medications:  None  Past obstetrical history:   2019 she had a 5 week chemical pregnancy  20 at 36 weeks and 3 days she had an indicated low-transverse  for twin males at [facility] weighing 5 pounds 13 ounces and 4 pounds 14 ounces  Indication was monoamniotic/diamniotic twin pregnancy  Baby B was breech  2021 D/E for and 8 week miscarriage  The fetus was diagnosed with triploidy  Social history:  Denies substance use  First generation family history:  Noncontributory    Ultrasound findings:  Normal interval fetal growth and fluid are seen  The anatomical survey appears normal  Some anatomy is limited secondary to fetal position  A fetal echo appears normal       The patient was informed of the findings and counseled about the limitations of the exam in detecting all forms of fetal congenital abnormalities  She does not report any vaginal bleeding or uterine cramping/contractions  She does feel fetal movement  Follow up recommended:   Recommend a follow-up ultrasound in 6 weeks for growth and missed anatomy  Recommend starting weekly NST/fluid scans at 36 weeks  The majority of time (greater then 50%) was spent on counseling and coordination of care of this patient and /or family member  Approximate face to face time was 15 minutes            Le Camarena MD

## 2022-01-11 NOTE — PROGRESS NOTES
OFFICE CONSULT  Cony Davis Do  [address],  0 [facility]     Dear Dr Fermin Acharya     Thank you for requesting a  consultation on your patient Yvonne Stewart for the following indications:  Recent transfer of care from [place]  She is an IVF pregnancy status post transfer on 21 and reports a due date of 22  This pregnancy had preimplantation genetics completed that was normal and a normal NIPT result by her report  Pregnancy complications include some bleeding on 21 at which time RhoGAM was given and she developed covid 2 weeks ago  She is a CF carrier and the father is a carrier for alpha thalassemia but they do not share the same carrier state  She had a history of a prior indicated  for a twin pregnancy 20 at 36 weeks and 3 days as her prior ob recommended a delivery prior to 37 weeks because of her mono amniotic dichorionic twins  She had a  because twin B was breech  She declined both her flu shot and her COVID vaccine in this pregnancy    History  Past medical history:  Postpartum thyroiditis that resolved and infertility  A TSH in this pregnancy was normal   Past surgical history: Tonsillectomy, wisdom tooth extraction, , dermoid cyst removal from her ovary and 5 week D/E  Medications:  Prenatal vitamins and vitamin-D  Allergies to medications:  None  Past obstetrical history:   2019 she had a 5 week chemical pregnancy  20 at 36 weeks and 3 days she had an indicated low-transverse  for twin males at [facility] weighing 5 pounds 13 ounces and 4 pounds 14 ounces  Indication was monoamniotic/diamniotic twin pregnancy  Baby B was breech  2021 D/E for and 8 week miscarriage  The fetus was diagnosed with triploidy  Social history:  Denies substance use  First generation family history:  Noncontributory    Ultrasound findings:  Normal interval fetal growth and fluid are seen    The anatomical survey appears normal  Some anatomy is limited secondary to fetal position  A fetal echo appears normal       The patient was informed of the findings and counseled about the limitations of the exam in detecting all forms of fetal congenital abnormalities  She does not report any vaginal bleeding or uterine cramping/contractions  She does feel fetal movement  Follow up recommended:   Recommend a follow-up ultrasound in 6 weeks for growth and missed anatomy  Recommend starting weekly NST/fluid scans at 36 weeks  The majority of time (greater then 50%) was spent on counseling and coordination of care of this patient and /or family member  Approximate face to face time was 15 minutes            Brian Weir MD

## 2022-01-13 ENCOUNTER — ROUTINE PRENATAL (OUTPATIENT)
Dept: OBGYN CLINIC | Facility: CLINIC | Age: 27
End: 2022-01-13

## 2022-01-13 VITALS — DIASTOLIC BLOOD PRESSURE: 60 MMHG | BODY MASS INDEX: 33.54 KG/M2 | WEIGHT: 207.8 LBS | SYSTOLIC BLOOD PRESSURE: 110 MMHG

## 2022-01-13 DIAGNOSIS — O34.219 PREGNANCY WITH HISTORY OF CESAREAN SECTION, ANTEPARTUM: ICD-10-CM

## 2022-01-13 DIAGNOSIS — O34.219 DESIRES VBAC (VAGINAL BIRTH AFTER CESAREAN) TRIAL: ICD-10-CM

## 2022-01-13 DIAGNOSIS — O09.813 PREGNANCY RESULTING FROM IN VITRO FERTILIZATION IN THIRD TRIMESTER: Primary | ICD-10-CM

## 2022-01-13 DIAGNOSIS — Z23 NEED FOR TDAP VACCINATION: ICD-10-CM

## 2022-01-13 PROCEDURE — PNV: Performed by: PHYSICIAN ASSISTANT

## 2022-01-13 NOTE — PROGRESS NOTES
Blood Type: O-  LOF/VB/CTX: Just BH  FM: Good  Labs: U2D  Urine:  Maddison Blancas Given Today   Pump: Has brand new one at home unused from last pregnancy  Last Rhogam: 11/25/21  Pt  Declines TDap today    Questions: About positive antibodies on recent blood work, and was on valtrex last preg   Beginning 32w and was wondering if she will need to start that again

## 2022-01-13 NOTE — PROGRESS NOTES
Patient is a 31 YO [id number] female presenting to the office at 28 3 weeks for routine OB care     BP: 110/60  TWlb  Fetal Movement: yes good movement  Feeling well today  Denies LOF, VB  Reports inconsistent BH  Discussed third trimester teaching  Reviewed fetal kick counts, normal FM  Reviewed signs of PTL  Reviewed red folder, consents, birth plan  Delivery consent obtained   Breastfeeding: plans to  Breast Pump: has one  TDAP: declined  FLU Vaccine: declined  COVID Vaccine: declined  28 Week Labs: +weak anti-D, had rhogam at 20 weeks, needs rhogam again at 32 weeks gestation  Prior twin c/s, desires TOLAC  Has anatomy scan f/u at 34 weeks  IVF pregnancy, will need weekly NST at 36 weeks  Start prophylactic valtrex at 36 weeks  RTO 2 weeks for routine OB F/U

## 2022-01-14 ENCOUNTER — TELEPHONE (OUTPATIENT)
Dept: PERINATAL CARE | Facility: OTHER | Age: 27
End: 2022-01-14

## 2022-01-14 NOTE — TELEPHONE ENCOUNTER
Called patient to reschedule follow up appointment cancelled in [facility]:    Appointment canceled for [facility] ([phone])   Visit Type: NST/MARISOL PG   Date        Time      Length    Provider                  Department   3/8/2022     1:00 PM  15 mins    US 2 OSIEL   AN  OSIEL   3/8/2022     1:15 PM  45 mins  84 Nava Carvajal      Reason for Cancellation: Canceled via [facility] with patient - she will be having appts at [facility]

## 2022-01-20 ENCOUNTER — TELEPHONE (OUTPATIENT)
Dept: OBGYN CLINIC | Facility: CLINIC | Age: 27
End: 2022-01-20

## 2022-01-20 NOTE — TELEPHONE ENCOUNTER
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

## 2022-01-20 NOTE — TELEPHONE ENCOUNTER
We can write patient a note saying that she is pregnant  However, we cannot write a letter saying that she is exempt

## 2022-01-20 NOTE — TELEPHONE ENCOUNTER
LMOM with information  Patient aware to call office with any questions or if she would like to proceed with pregnancy letter

## 2022-01-27 ENCOUNTER — ROUTINE PRENATAL (OUTPATIENT)
Dept: OBGYN CLINIC | Facility: CLINIC | Age: 27
End: 2022-01-27

## 2022-01-27 VITALS — WEIGHT: 212 LBS | DIASTOLIC BLOOD PRESSURE: 66 MMHG | SYSTOLIC BLOOD PRESSURE: 112 MMHG | BODY MASS INDEX: 34.22 KG/M2

## 2022-01-27 DIAGNOSIS — O09.813 PREGNANCY RESULTING FROM IN VITRO FERTILIZATION IN THIRD TRIMESTER: ICD-10-CM

## 2022-01-27 DIAGNOSIS — U07.1 COVID-19 AFFECTING PREGNANCY IN SECOND TRIMESTER: ICD-10-CM

## 2022-01-27 DIAGNOSIS — O98.512 COVID-19 AFFECTING PREGNANCY IN SECOND TRIMESTER: ICD-10-CM

## 2022-01-27 DIAGNOSIS — O34.219 DESIRES VBAC (VAGINAL BIRTH AFTER CESAREAN) TRIAL: ICD-10-CM

## 2022-01-27 DIAGNOSIS — Z3A.30 30 WEEKS GESTATION OF PREGNANCY: Primary | ICD-10-CM

## 2022-01-27 DIAGNOSIS — O34.219 PREGNANCY WITH HISTORY OF CESAREAN SECTION, ANTEPARTUM: ICD-10-CM

## 2022-01-27 PROCEDURE — PNV: Performed by: OBSTETRICS & GYNECOLOGY

## 2022-01-27 NOTE — PROGRESS NOTES
This is a 32 y o  [id number] at 30w3d who presents for return OB visit  No complaints  Denies contractions, leakage, bleeding   Endorses fetal movement   BP: 112/66 TWlb  Discussed visitor policy, induction methods, avoidance of prostaglandins in TOLAC  Pt planning on breastfeeding  F/up 2 wk

## 2022-01-27 NOTE — PROGRESS NOTES
Denies n/v/h  Denies LOF/VB  Would like to discuss potential leave date for work as well as induction process  Also has questions about COVID concerns during delivery  Urine neg/neg

## 2022-01-28 ENCOUNTER — TELEPHONE (OUTPATIENT)
Dept: OBGYN CLINIC | Facility: CLINIC | Age: 27
End: 2022-01-28

## 2022-01-28 NOTE — TELEPHONE ENCOUNTER
She can have a note that says her due date is on 4/4 and she would like to start her maternity leave to start on 3/14

## 2022-01-28 NOTE — TELEPHONE ENCOUNTER
Patient called, she was here yesterday for a OB visit  She states she forgot to ask you while she was here if she could get a letter stating her maternity leave starts on 3/14   She is due on 4/4

## 2022-02-07 ENCOUNTER — ROUTINE PRENATAL (OUTPATIENT)
Dept: OBGYN CLINIC | Facility: CLINIC | Age: 27
End: 2022-02-07
Payer: COMMERCIAL

## 2022-02-07 VITALS — DIASTOLIC BLOOD PRESSURE: 70 MMHG | BODY MASS INDEX: 35.02 KG/M2 | WEIGHT: 217 LBS | SYSTOLIC BLOOD PRESSURE: 116 MMHG

## 2022-02-07 DIAGNOSIS — O09.813 PREGNANCY RESULTING FROM IN VITRO FERTILIZATION IN THIRD TRIMESTER: Primary | ICD-10-CM

## 2022-02-07 DIAGNOSIS — O34.219 DESIRES VBAC (VAGINAL BIRTH AFTER CESAREAN) TRIAL: ICD-10-CM

## 2022-02-07 DIAGNOSIS — O34.219 PREGNANCY WITH HISTORY OF CESAREAN SECTION, ANTEPARTUM: ICD-10-CM

## 2022-02-07 DIAGNOSIS — Z29.13 NEED FOR RHOGAM DUE TO RH NEGATIVE MOTHER: ICD-10-CM

## 2022-02-07 PROCEDURE — 90384 RH IG FULL-DOSE IM: CPT

## 2022-02-07 PROCEDURE — PNV: Performed by: OBSTETRICS & GYNECOLOGY

## 2022-02-07 NOTE — PROGRESS NOTES
32 y o  [id number] female at 32w0d (Estimated Date of Delivery: 22) for PNV      Pre-Teofilo Vitals      Most Recent Value   Prenatal Assessment    Movement Present   Prenatal Vitals    Blood Pressure 116/70   Weight - Scale 98 4 kg (217 lb)   Urine Albumin/Glucose    Dilation/Effacement/Station    Vaginal Drainage    Edema    LLE Edema None   RLE Edema None   Facial Edema None        TW 1 kg (42 lb)  Rhogam today  Doing well third trimester  Growth u/s scheduled

## 2022-02-20 NOTE — PROGRESS NOTES
Please refer to the [facility] ultrasound report in Ob Procedures for additional information regarding today's visit

## 2022-02-21 ENCOUNTER — ULTRASOUND (OUTPATIENT)
Dept: PERINATAL CARE | Facility: OTHER | Age: 27
End: 2022-02-21
Payer: COMMERCIAL

## 2022-02-21 VITALS
HEIGHT: 66 IN | WEIGHT: 220.02 LBS | SYSTOLIC BLOOD PRESSURE: 122 MMHG | BODY MASS INDEX: 35.36 KG/M2 | HEART RATE: 78 BPM | DIASTOLIC BLOOD PRESSURE: 76 MMHG

## 2022-02-21 DIAGNOSIS — Z3A.34 34 WEEKS GESTATION OF PREGNANCY: Primary | ICD-10-CM

## 2022-02-21 DIAGNOSIS — O09.813 PREGNANCY RESULTING FROM IN VITRO FERTILIZATION IN THIRD TRIMESTER: ICD-10-CM

## 2022-02-21 PROCEDURE — 76816 OB US FOLLOW-UP PER FETUS: CPT | Performed by: OBSTETRICS & GYNECOLOGY

## 2022-02-21 NOTE — LETTER
February 21, 2022     Darion Anaya MD  [address]  [address]  [facility]    Patient: Amadou Clark   YOB: 1995   Date of Visit: 2/21/2022       Dear Dr Getachew Hallman:    Thank you for referring Amadou Clark to me for evaluation  Below are my notes for this consultation  If you have questions, please do not hesitate to call me  I look forward to following your patient along with you  Sincerely,        Marylin Ashraf MD        CC: No Recipients  Marylin Ashraf MD  2/19/2022  7:53 PM  Sign when Signing Visit  Please refer to the [facility] ultrasound report in Ob Procedures for additional information regarding today's visit

## 2022-02-21 NOTE — PATIENT INSTRUCTIONS
Kick Counts in Pregnancy   WHAT YOU NEED TO KNOW:   Kick counts measure how much your baby is moving in your womb  A kick from your baby can be felt as a twist, turn, swish, roll, or jab  It is common to feel your baby kicking at 26 to 28 weeks of pregnancy  You may feel your baby kick as early as 20 weeks of pregnancy  You may want to start counting at 28 weeks  DISCHARGE INSTRUCTIONS:   Contact your doctor immediately if:   · You feel a change in the number of kicks or movements of your baby  · You feel fewer than 10 kicks within 2 hours  · You have questions or concerns about your baby's movements  Why measure kick counts:  Your baby's movement may provide information about your baby's health  He or she may move less, or not at all, if there are problems  Your baby may move less if he or she is not getting enough oxygen or nutrition from the placenta  Do not smoke while you are pregnant  Smoking decreases the amount of oxygen that gets to your baby  Talk to your healthcare provider if you need help to quit smoking  Tell your healthcare provider as soon as you feel a change in your baby's movements  When to measure kick counts:   · Measure kick counts at the same time every day  · Measure kick counts when your baby is awake and most active  Your baby may be most active in the evening  How to measure kick counts:  Check that your baby is awake before you measure kick counts  You can wake up your baby by lightly pushing on your belly, walking, or drinking something cold  Your healthcare provider may tell you different ways to measure kick counts  You may be told to do the following:  · Use a chart or clock to keep track of the time you start and finish counting  · Sit in a chair or lie on your left side  · Place your hands on the largest part of your belly  · Count until you reach 10 kicks  Write down how much time it takes to count 10 kicks       · It may take 30 minutes to 2 hours to count 10 kicks  It should not take more than 2 hours to count 10 kicks  Follow up with your doctor as directed:  Write down your questions so you remember to ask them during your visits  © Copyright [employer] 2021 Information is for End User's use only and may not be sold, redistributed or otherwise used for commercial purposes  All illustrations and images included in CareNotes® are the copyrighted property of A D A M , Inc  or Formerly named [facility] Micheal Holcomb   The above information is an  only  It is not intended as medical advice for individual conditions or treatments  Talk to your doctor, nurse or pharmacist before following any medical regimen to see if it is safe and effective for you

## 2022-02-23 ENCOUNTER — ROUTINE PRENATAL (OUTPATIENT)
Dept: OBGYN CLINIC | Facility: CLINIC | Age: 27
End: 2022-02-23

## 2022-02-23 VITALS — WEIGHT: 219 LBS | DIASTOLIC BLOOD PRESSURE: 68 MMHG | SYSTOLIC BLOOD PRESSURE: 118 MMHG | BODY MASS INDEX: 35.35 KG/M2

## 2022-02-23 DIAGNOSIS — O34.219 PREGNANCY WITH HISTORY OF CESAREAN SECTION, ANTEPARTUM: ICD-10-CM

## 2022-02-23 DIAGNOSIS — O09.813 PREGNANCY RESULTING FROM IN VITRO FERTILIZATION IN THIRD TRIMESTER: Primary | ICD-10-CM

## 2022-02-23 DIAGNOSIS — Z3A.34 34 WEEKS GESTATION OF PREGNANCY: ICD-10-CM

## 2022-02-23 DIAGNOSIS — O34.219 DESIRES VBAC (VAGINAL BIRTH AFTER CESAREAN) TRIAL: ICD-10-CM

## 2022-02-23 PROCEDURE — PNV: Performed by: PHYSICIAN ASSISTANT

## 2022-02-23 NOTE — PROGRESS NOTES
Patient is a 33 YO [id number] female presenting to the office at 34 2 weeks for routine OB care     BP: 118/68  TWlb  Fetal Movement: yes good movement  Feeling well today  Denies LOF, CTX, VB  IVF preg, weekly NST at 36 week  Patient to call for concerns  RTO 2 weeks, will need GBS

## 2022-02-28 ENCOUNTER — TELEPHONE (OUTPATIENT)
Dept: OBGYN CLINIC | Facility: CLINIC | Age: 27
End: 2022-02-28

## 2022-02-28 DIAGNOSIS — O34.219 DESIRES VBAC (VAGINAL BIRTH AFTER CESAREAN) TRIAL: ICD-10-CM

## 2022-02-28 DIAGNOSIS — O34.219 PREGNANCY WITH HISTORY OF CESAREAN SECTION, ANTEPARTUM: ICD-10-CM

## 2022-02-28 DIAGNOSIS — O09.813 PREGNANCY RESULTING FROM IN VITRO FERTILIZATION IN THIRD TRIMESTER: ICD-10-CM

## 2022-02-28 DIAGNOSIS — Z3A.30 30 WEEKS GESTATION OF PREGNANCY: ICD-10-CM

## 2022-02-28 NOTE — TELEPHONE ENCOUNTER
Pt  Calling with nausea and abdominal pain over the last week  Declines vomiting, diarrhea  Abdominal pain unrelieved by tylenol  Uses tums for relief of reflux  Advised smaller meals, bland foods, increased hydration  Call back with worsening

## 2022-03-01 ENCOUNTER — ROUTINE PRENATAL (OUTPATIENT)
Dept: OBGYN CLINIC | Facility: CLINIC | Age: 27
End: 2022-03-01

## 2022-03-01 VITALS — WEIGHT: 220 LBS | DIASTOLIC BLOOD PRESSURE: 72 MMHG | SYSTOLIC BLOOD PRESSURE: 120 MMHG | BODY MASS INDEX: 35.51 KG/M2

## 2022-03-01 DIAGNOSIS — Z3A.35 35 WEEKS GESTATION OF PREGNANCY: ICD-10-CM

## 2022-03-01 DIAGNOSIS — O34.219 DESIRES VBAC (VAGINAL BIRTH AFTER CESAREAN) TRIAL: ICD-10-CM

## 2022-03-01 DIAGNOSIS — O09.813 PREGNANCY RESULTING FROM IN VITRO FERTILIZATION IN THIRD TRIMESTER: Primary | ICD-10-CM

## 2022-03-01 DIAGNOSIS — O34.219 PREGNANCY WITH HISTORY OF CESAREAN SECTION, ANTEPARTUM: ICD-10-CM

## 2022-03-01 PROCEDURE — PNV: Performed by: PHYSICIAN ASSISTANT

## 2022-03-01 NOTE — PROGRESS NOTES
Patient is a 33 YO [id number] female presenting to the office at 35 1 weeks for routine OB care  BP: 120/72  TWlb  Fetal Movement: yes good movement  Feeling well today  Denies LOF, CTX, VB  Reviewed labor precautions  GBS and GC next visit  Patient having some mild nausea, no bleeding or vomiting  Call for concerns  RTO 2 weeks

## 2022-03-07 ENCOUNTER — ROUTINE PRENATAL (OUTPATIENT)
Dept: OBGYN CLINIC | Facility: CLINIC | Age: 27
End: 2022-03-07
Payer: COMMERCIAL

## 2022-03-07 VITALS — WEIGHT: 221 LBS | DIASTOLIC BLOOD PRESSURE: 72 MMHG | SYSTOLIC BLOOD PRESSURE: 114 MMHG | BODY MASS INDEX: 35.67 KG/M2

## 2022-03-07 DIAGNOSIS — O34.219 DESIRES VBAC (VAGINAL BIRTH AFTER CESAREAN) TRIAL: ICD-10-CM

## 2022-03-07 DIAGNOSIS — Z11.3 SCREENING FOR STD (SEXUALLY TRANSMITTED DISEASE): ICD-10-CM

## 2022-03-07 DIAGNOSIS — O09.813 PREGNANCY RESULTING FROM IN VITRO FERTILIZATION IN THIRD TRIMESTER: ICD-10-CM

## 2022-03-07 DIAGNOSIS — Z3A.36 36 WEEKS GESTATION OF PREGNANCY: Primary | ICD-10-CM

## 2022-03-07 DIAGNOSIS — O34.219 PREGNANCY WITH HISTORY OF CESAREAN SECTION, ANTEPARTUM: ICD-10-CM

## 2022-03-07 DIAGNOSIS — Z3A.30 30 WEEKS GESTATION OF PREGNANCY: ICD-10-CM

## 2022-03-07 PROCEDURE — 59025 FETAL NON-STRESS TEST: CPT | Performed by: OBSTETRICS & GYNECOLOGY

## 2022-03-07 PROCEDURE — PNV: Performed by: OBSTETRICS & GYNECOLOGY

## 2022-03-07 NOTE — PROGRESS NOTES
32 y o  [id number] female at 36w0d (Estimated Date of Delivery: 22) for PNV  Pre-Teofilo Vitals      Most Recent Value   Prenatal Assessment    Fetal Heart Rate 125   Movement Present   Presentation Vertex   Prenatal Vitals    Blood Pressure 114/72   Weight - Scale 100 kg (221 lb)   Urine Albumin/Glucose    Dilation/Effacement/Station    Vaginal Drainage    Edema    LLE Edema None   RLE Edema None   Facial Edema None        TW 9 kg (46 lb)    Patient presents for return OB visit  Feeling well and has no complaints  Denies regular painful contractions, vaginal bleeding, and leakage  Reports daily FM  GBS and GC/Chlamydia collected today  BMI 35 - weekly NSTs  NST performed today  Reactive and reassuring  Vertex presentation confirmed by abdominal US today  Prior c/s due to twin gestation  Desires TOLAC  Discussed IOL vs spontaneous labor  Would prefer to defer IOL to just before FRANCES if needed and would prefer to attempt IOL before scheduling another   Patient reports that TAPs block was performed after her  with her prior c/s which helped significantly with pain control  She reports not needing opiates and only needing tylenol for about a week postop  Follow up in 1 week

## 2022-03-07 NOTE — PROGRESS NOTES
NST/MARISOL scheduled for today  GBS/GCC testing due today  Patient has a list of questions on her phone she would like to discuss

## 2022-03-10 LAB
C TRACH RRNA SPEC QL NAA+PROBE: NEGATIVE
GP B STREP DNA SPEC QL NAA+PROBE: POSITIVE
N GONORRHOEA RRNA SPEC QL NAA+PROBE: NEGATIVE

## 2022-03-11 ENCOUNTER — TELEPHONE (OUTPATIENT)
Dept: OBGYN CLINIC | Facility: CLINIC | Age: 27
End: 2022-03-11

## 2022-03-11 DIAGNOSIS — O98.513 HSV-2 INFECTION COMPLICATING PREGNANCY, THIRD TRIMESTER: Primary | ICD-10-CM

## 2022-03-11 DIAGNOSIS — B00.9 HSV-2 INFECTION COMPLICATING PREGNANCY, THIRD TRIMESTER: Primary | ICD-10-CM

## 2022-03-11 RX ORDER — VALACYCLOVIR HYDROCHLORIDE 500 MG/1
500 TABLET, FILM COATED ORAL 2 TIMES DAILY
Qty: 60 TABLET | Refills: 1 | Status: SHIPPED | OUTPATIENT
Start: 2022-03-11 | End: 2022-05-19

## 2022-03-11 NOTE — TELEPHONE ENCOUNTER
Patient called stating that when she was here on 3/7 you discussed sending prophylaxis valtrex for her to start taking at 36 weeks  She went to the pharmacy and it wasn't there  I don't see it ordered   She uses the [facility] in [address] [place] [address]

## 2022-03-14 ENCOUNTER — ROUTINE PRENATAL (OUTPATIENT)
Dept: OBGYN CLINIC | Facility: CLINIC | Age: 27
End: 2022-03-14

## 2022-03-14 VITALS — SYSTOLIC BLOOD PRESSURE: 116 MMHG | DIASTOLIC BLOOD PRESSURE: 66 MMHG | WEIGHT: 226.2 LBS | BODY MASS INDEX: 36.51 KG/M2

## 2022-03-14 DIAGNOSIS — O34.219 DESIRES VBAC (VAGINAL BIRTH AFTER CESAREAN) TRIAL: ICD-10-CM

## 2022-03-14 DIAGNOSIS — O34.219 PREGNANCY WITH HISTORY OF CESAREAN SECTION, ANTEPARTUM: ICD-10-CM

## 2022-03-14 DIAGNOSIS — O09.813 PREGNANCY RESULTING FROM IN VITRO FERTILIZATION IN THIRD TRIMESTER: Primary | ICD-10-CM

## 2022-03-14 DIAGNOSIS — Z3A.37 37 WEEKS GESTATION OF PREGNANCY: ICD-10-CM

## 2022-03-14 PROCEDURE — PNV: Performed by: OBSTETRICS & GYNECOLOGY

## 2022-03-14 NOTE — PROGRESS NOTES
NST/MARISOL to be done  Denies n/v/h  Denies LOF/VB  Will undress for cervical check after NST/MARISOL  Urine neg/neg

## 2022-03-14 NOTE — PROGRESS NOTES
32 y o  [id number] female at 37w0d (Estimated Date of Delivery: 22) for PNV      Pre-Teofilo Vitals      Most Recent Value   Prenatal Assessment    Movement Present   Prenatal Vitals    Blood Pressure 116/66   Weight - Scale 103 kg (226 lb 3 2 oz)   Urine Albumin/Glucose    Dilation/Effacement/Station    Vaginal Drainage    Edema    LLE Edema None   RLE Edema None   Facial Edema None        TW 2 kg (51 lb 3 2 oz) ob  NST is reactive, Cat I  Reviewed labor precautions  Plans to TOLAC, would consider IOL at 40 weeks  SVE closed

## 2022-03-21 ENCOUNTER — ROUTINE PRENATAL (OUTPATIENT)
Dept: OBGYN CLINIC | Facility: CLINIC | Age: 27
End: 2022-03-21
Payer: COMMERCIAL

## 2022-03-21 VITALS — SYSTOLIC BLOOD PRESSURE: 132 MMHG | BODY MASS INDEX: 36.41 KG/M2 | DIASTOLIC BLOOD PRESSURE: 70 MMHG | WEIGHT: 225.6 LBS

## 2022-03-21 DIAGNOSIS — O34.219 DESIRES VBAC (VAGINAL BIRTH AFTER CESAREAN) TRIAL: ICD-10-CM

## 2022-03-21 DIAGNOSIS — O09.813 PREGNANCY RESULTING FROM IN VITRO FERTILIZATION IN THIRD TRIMESTER: Primary | ICD-10-CM

## 2022-03-21 DIAGNOSIS — R63.5 EXCESSIVE WEIGHT GAIN: ICD-10-CM

## 2022-03-21 PROCEDURE — PNV: Performed by: PHYSICIAN ASSISTANT

## 2022-03-21 PROCEDURE — 59025 FETAL NON-STRESS TEST: CPT | Performed by: PHYSICIAN ASSISTANT

## 2022-03-21 NOTE — PROGRESS NOTES
Patient is a 33 YO [id number] female presenting to the office at 38 weeks for routine OB care  BP: 132/70  TWlb  Fetal Movement: yes good movement  Feeling well today  Denies LOF, CTX, VB  Desires IOL at 40 weeks with Dr Kamari Quintanilla (will attempt to schedule for 4/3 at 8pm for ripening) - TOLAC, prior c/s with twins  NST reactive and reassuring  MARISOL 17 1  Birth plan reviewed     Reviewed labor precautions  RTO 1 week for NST/MARISOL

## 2022-03-21 NOTE — PROGRESS NOTES
Patient states she is feeling well, no complaints  She would like to talk about induction and would like a cervix check today     Urine neg/neg

## 2022-03-24 ENCOUNTER — TELEPHONE (OUTPATIENT)
Dept: OBGYN CLINIC | Facility: CLINIC | Age: 27
End: 2022-03-24

## 2022-03-24 NOTE — TELEPHONE ENCOUNTER
Pt  Calling to setup 39 wk induction  No spots available on L&D for eIOl at this time   Pt  Has routine OBV 3/28

## 2022-03-28 ENCOUNTER — ROUTINE PRENATAL (OUTPATIENT)
Dept: OBGYN CLINIC | Facility: CLINIC | Age: 27
End: 2022-03-28

## 2022-03-28 ENCOUNTER — PATIENT MESSAGE (OUTPATIENT)
Dept: OBGYN CLINIC | Facility: CLINIC | Age: 27
End: 2022-03-28

## 2022-03-28 VITALS — BODY MASS INDEX: 37.12 KG/M2 | WEIGHT: 230 LBS | SYSTOLIC BLOOD PRESSURE: 120 MMHG | DIASTOLIC BLOOD PRESSURE: 70 MMHG

## 2022-03-28 DIAGNOSIS — O34.219 PREGNANCY WITH HISTORY OF CESAREAN SECTION, ANTEPARTUM: ICD-10-CM

## 2022-03-28 DIAGNOSIS — O34.219 DESIRES VBAC (VAGINAL BIRTH AFTER CESAREAN) TRIAL: ICD-10-CM

## 2022-03-28 DIAGNOSIS — Z3A.39 39 WEEKS GESTATION OF PREGNANCY: Primary | ICD-10-CM

## 2022-03-28 DIAGNOSIS — O09.813 PREGNANCY RESULTING FROM IN VITRO FERTILIZATION IN THIRD TRIMESTER: ICD-10-CM

## 2022-03-28 PROCEDURE — PNV: Performed by: OBSTETRICS & GYNECOLOGY

## 2022-03-28 NOTE — PROGRESS NOTES
Patient states she is having some BH contractions but denies any fluid leaking or pelvic pressure  Undressed for a cervical check

## 2022-03-28 NOTE — PROGRESS NOTES
32 y o  [id number] female at 39w0d (Estimated Date of Delivery: 22) for PNV  Pre-Teofilo Vitals      Most Recent Value   Prenatal Assessment    Fetal Heart Rate 120   Movement Present   Prenatal Vitals    Blood Pressure 120/70   Weight - Scale 104 kg (230 lb)   Urine Albumin/Glucose    Dilation/Effacement/Station    Cervical Dilation 0   Cervical Effacement 0   Fetal Station -3   Vaginal Drainage    Edema    LLE Edema None   RLE Edema None   Facial Edema None        TW 9 kg (55 lb)    Patient presents for return OB visit  Feeling well  Having BH contractions but nothing painful and consistent  Denies vaginal bleeding and leakage  Reports daily FM  GBS positive  Needs penicillin in labor  No penicillin allergy  Prior c/s - desires TOLAC  IOL scheduled for 3/30/22 at 8 pm  IOL consent signed today  Reviewed risks and benefits  SVE closed/thick/-3, soft, posterior  Labor precautions reviewed     Follow up postpartum

## 2022-03-30 ENCOUNTER — HOSPITAL ENCOUNTER (INPATIENT)
Facility: HOSPITAL | Age: 27
LOS: 3 days | Discharge: HOME | End: 2022-04-02
Attending: OBSTETRICS & GYNECOLOGY | Admitting: OBSTETRICS & GYNECOLOGY
Payer: COMMERCIAL

## 2022-03-30 DIAGNOSIS — O34.219 VBAC, DELIVERED: Primary | ICD-10-CM

## 2022-03-30 DIAGNOSIS — Z3A.39 39 WEEKS GESTATION OF PREGNANCY: ICD-10-CM

## 2022-03-30 PROBLEM — B00.9 HSV INFECTION: Status: ACTIVE | Noted: 2022-03-30

## 2022-03-30 LAB
ABO GROUP BLD: NORMAL
BASOPHILS # BLD AUTO: 0.02 THOUSANDS/ΜL (ref 0–0.1)
BASOPHILS NFR BLD AUTO: 0 % (ref 0–1)
BLD GP AB SCN SERPL QL: POSITIVE
BLOOD GROUP ANTIBODIES SERPL: NORMAL
EOSINOPHIL # BLD AUTO: 0.04 THOUSAND/ΜL (ref 0–0.61)
EOSINOPHIL NFR BLD AUTO: 0 % (ref 0–6)
ERYTHROCYTE [DISTWIDTH] IN BLOOD BY AUTOMATED COUNT: 13.3 % (ref 11.6–15.1)
HCT VFR BLD AUTO: 36.1 % (ref 34.8–46.1)
HGB BLD-MCNC: 12.2 G/DL (ref 11.5–15.4)
IMM GRANULOCYTES # BLD AUTO: 0.03 THOUSAND/UL (ref 0–0.2)
IMM GRANULOCYTES NFR BLD AUTO: 0 % (ref 0–2)
LYMPHOCYTES # BLD AUTO: 2.32 THOUSANDS/ΜL (ref 0.6–4.47)
LYMPHOCYTES NFR BLD AUTO: 25 % (ref 14–44)
MCH RBC QN AUTO: 29.9 PG (ref 26.8–34.3)
MCHC RBC AUTO-ENTMCNC: 33.8 G/DL (ref 31.4–37.4)
MCV RBC AUTO: 89 FL (ref 82–98)
MONOCYTES # BLD AUTO: 0.44 THOUSAND/ΜL (ref 0.17–1.22)
MONOCYTES NFR BLD AUTO: 5 % (ref 4–12)
NEUTROPHILS # BLD AUTO: 6.31 THOUSANDS/ΜL (ref 1.85–7.62)
NEUTS SEG NFR BLD AUTO: 70 % (ref 43–75)
NRBC BLD AUTO-RTO: 0 /100 WBCS
PLATELET # BLD AUTO: 203 THOUSANDS/UL (ref 149–390)
PMV BLD AUTO: 10.1 FL (ref 8.9–12.7)
RBC # BLD AUTO: 4.08 MILLION/UL (ref 3.81–5.12)
RH BLD: NEGATIVE
SPECIMEN EXPIRATION DATE: NORMAL
WBC # BLD AUTO: 9.16 THOUSAND/UL (ref 4.31–10.16)

## 2022-03-30 PROCEDURE — 4A1HXCZ MONITORING OF PRODUCTS OF CONCEPTION, CARDIAC RATE, EXTERNAL APPROACH: ICD-10-PCS | Performed by: OBSTETRICS & GYNECOLOGY

## 2022-03-30 PROCEDURE — 86901 BLOOD TYPING SEROLOGIC RH(D): CPT

## 2022-03-30 PROCEDURE — 86592 SYPHILIS TEST NON-TREP QUAL: CPT

## 2022-03-30 PROCEDURE — 85025 COMPLETE CBC W/AUTO DIFF WBC: CPT

## 2022-03-30 PROCEDURE — 86850 RBC ANTIBODY SCREEN: CPT

## 2022-03-30 PROCEDURE — NC001 PR NO CHARGE: Performed by: OBSTETRICS & GYNECOLOGY

## 2022-03-30 PROCEDURE — 86900 BLOOD TYPING SEROLOGIC ABO: CPT

## 2022-03-30 PROCEDURE — 86870 RBC ANTIBODY IDENTIFICATION: CPT

## 2022-03-30 RX ORDER — CALCIUM CARBONATE 200(500)MG
1000 TABLET,CHEWABLE ORAL DAILY PRN
Status: DISCONTINUED | OUTPATIENT
Start: 2022-03-30 | End: 2022-04-01

## 2022-03-30 RX ORDER — SODIUM CHLORIDE, SODIUM LACTATE, POTASSIUM CHLORIDE, CALCIUM CHLORIDE 600; 310; 30; 20 MG/100ML; MG/100ML; MG/100ML; MG/100ML
125 INJECTION, SOLUTION INTRAVENOUS CONTINUOUS
Status: DISCONTINUED | OUTPATIENT
Start: 2022-03-30 | End: 2022-04-01

## 2022-03-30 RX ORDER — ACETAMINOPHEN 325 MG/1
650 TABLET ORAL EVERY 6 HOURS PRN
Status: DISCONTINUED | OUTPATIENT
Start: 2022-03-30 | End: 2022-04-01

## 2022-03-30 RX ORDER — ONDANSETRON 2 MG/ML
4 INJECTION INTRAMUSCULAR; INTRAVENOUS EVERY 6 HOURS PRN
Status: DISCONTINUED | OUTPATIENT
Start: 2022-03-30 | End: 2022-04-01

## 2022-03-30 RX ADMIN — SODIUM CHLORIDE, SODIUM LACTATE, POTASSIUM CHLORIDE, AND CALCIUM CHLORIDE 125 ML/HR: .6; .31; .03; .02 INJECTION, SOLUTION INTRAVENOUS at 20:45

## 2022-03-30 RX ADMIN — SODIUM CHLORIDE 5 MILLION UNITS: 9 INJECTION, SOLUTION INTRAVENOUS at 21:28

## 2022-03-31 ENCOUNTER — ANESTHESIA (INPATIENT)
Dept: ANESTHESIOLOGY | Facility: HOSPITAL | Age: 27
End: 2022-03-31
Payer: COMMERCIAL

## 2022-03-31 ENCOUNTER — ANESTHESIA EVENT (INPATIENT)
Dept: ANESTHESIOLOGY | Facility: HOSPITAL | Age: 27
End: 2022-03-31
Payer: COMMERCIAL

## 2022-03-31 LAB — RPR SER QL: NORMAL

## 2022-03-31 PROCEDURE — 3E033VJ INTRODUCTION OF OTHER HORMONE INTO PERIPHERAL VEIN, PERCUTANEOUS APPROACH: ICD-10-PCS | Performed by: OBSTETRICS & GYNECOLOGY

## 2022-03-31 PROCEDURE — 10H07YZ INSERTION OF OTHER DEVICE INTO PRODUCTS OF CONCEPTION, VIA NATURAL OR ARTIFICIAL OPENING: ICD-10-PCS | Performed by: OBSTETRICS & GYNECOLOGY

## 2022-03-31 PROCEDURE — 10907ZC DRAINAGE OF AMNIOTIC FLUID, THERAPEUTIC FROM PRODUCTS OF CONCEPTION, VIA NATURAL OR ARTIFICIAL OPENING: ICD-10-PCS | Performed by: OBSTETRICS & GYNECOLOGY

## 2022-03-31 RX ORDER — ROPIVACAINE HYDROCHLORIDE 2 MG/ML
INJECTION, SOLUTION EPIDURAL; INFILTRATION; PERINEURAL
Status: COMPLETED | OUTPATIENT
Start: 2022-03-31 | End: 2022-03-31

## 2022-03-31 RX ORDER — DIPHENHYDRAMINE HYDROCHLORIDE 50 MG/ML
25 INJECTION INTRAMUSCULAR; INTRAVENOUS EVERY 6 HOURS PRN
Status: DISCONTINUED | OUTPATIENT
Start: 2022-03-31 | End: 2022-04-01

## 2022-03-31 RX ORDER — ONDANSETRON 2 MG/ML
4 INJECTION INTRAMUSCULAR; INTRAVENOUS EVERY 4 HOURS PRN
Status: DISCONTINUED | OUTPATIENT
Start: 2022-03-31 | End: 2022-04-01

## 2022-03-31 RX ORDER — LIDOCAINE HYDROCHLORIDE AND EPINEPHRINE 15; 5 MG/ML; UG/ML
INJECTION, SOLUTION EPIDURAL
Status: COMPLETED | OUTPATIENT
Start: 2022-03-31 | End: 2022-03-31

## 2022-03-31 RX ORDER — LIDOCAINE HYDROCHLORIDE 10 MG/ML
INJECTION, SOLUTION EPIDURAL; INFILTRATION; INTRACAUDAL; PERINEURAL
Status: COMPLETED | OUTPATIENT
Start: 2022-03-31 | End: 2022-03-31

## 2022-03-31 RX ORDER — OXYTOCIN/RINGER'S LACTATE 30/500 ML
1-30 PLASTIC BAG, INJECTION (ML) INTRAVENOUS
Status: DISCONTINUED | OUTPATIENT
Start: 2022-03-31 | End: 2022-04-01

## 2022-03-31 RX ADMIN — SODIUM CHLORIDE, SODIUM LACTATE, POTASSIUM CHLORIDE, AND CALCIUM CHLORIDE 125 ML/HR: .6; .31; .03; .02 INJECTION, SOLUTION INTRAVENOUS at 18:07

## 2022-03-31 RX ADMIN — LIDOCAINE HYDROCHLORIDE AND EPINEPHRINE 3 ML: 15; 5 INJECTION, SOLUTION EPIDURAL at 19:45

## 2022-03-31 RX ADMIN — ROPIVACAINE HYDROCHLORIDE: 2 INJECTION, SOLUTION EPIDURAL; INFILTRATION at 19:51

## 2022-03-31 RX ADMIN — SODIUM CHLORIDE 2.5 MILLION UNITS: 9 INJECTION, SOLUTION INTRAVENOUS at 05:19

## 2022-03-31 RX ADMIN — ROPIVACAINE HYDROCHLORIDE 10 ML: 2 INJECTION, SOLUTION EPIDURAL; INFILTRATION at 19:45

## 2022-03-31 RX ADMIN — SODIUM CHLORIDE 2.5 MILLION UNITS: 9 INJECTION, SOLUTION INTRAVENOUS at 22:22

## 2022-03-31 RX ADMIN — SODIUM CHLORIDE 2.5 MILLION UNITS: 9 INJECTION, SOLUTION INTRAVENOUS at 01:22

## 2022-03-31 RX ADMIN — SODIUM CHLORIDE 2.5 MILLION UNITS: 9 INJECTION, SOLUTION INTRAVENOUS at 13:23

## 2022-03-31 RX ADMIN — LIDOCAINE HYDROCHLORIDE 5 ML: 10 INJECTION, SOLUTION EPIDURAL; INFILTRATION; INTRACAUDAL at 19:45

## 2022-03-31 RX ADMIN — SODIUM CHLORIDE 2.5 MILLION UNITS: 9 INJECTION, SOLUTION INTRAVENOUS at 09:22

## 2022-03-31 RX ADMIN — SODIUM CHLORIDE, SODIUM LACTATE, POTASSIUM CHLORIDE, AND CALCIUM CHLORIDE 125 ML/HR: .6; .31; .03; .02 INJECTION, SOLUTION INTRAVENOUS at 08:00

## 2022-03-31 RX ADMIN — SODIUM CHLORIDE 2.5 MILLION UNITS: 9 INJECTION, SOLUTION INTRAVENOUS at 17:49

## 2022-03-31 RX ADMIN — Medication 2 MILLI-UNITS/MIN: at 09:53

## 2022-03-31 NOTE — H&P
H&P Exam - Obstetrics   Farzad Castellanos 32 y o  female MRN: [id number]  Unit/Bed#: Jolie Kocher Encounter: [id number]      History of Present Illness     Chief Complaint: Induction of labor    HPI:  Farazd Castellanos is a 32 y o  [id number] female with an FRANCES of 2022, by Patient Reported at 39w2d weeks gestation who is being admitted for IOL  Patient conceived via IVF has had weekly NST/MARISOL since 36w  Patient desires TOLAC, prior c/s for twins  Patient is on ppx valtrex  Contractions: no  Loss of fluid: no  Vaginal bleeding: no  Fetal movement: yes    She is a Dr Bart Torres patient  PREGNANCY COMPLICATIONS:     OB History    Para Term  AB Living   4 1 0 1 2 2   SAB IAB Ectopic Multiple Live Births   1 0 0 1 2      # Outcome Date GA Lbr Agusto/2nd Weight Sex Delivery Anes PTL Lv   4 Current            3 SAB 2021           2A  20 36w3d  2660 g (5 lb 13 8 oz) M CS-LTranv Spinal Y SIL   2B  20 36w3d  2235 g (4 lb 14 8 oz) M CS-LTranv Spinal Y SIL   1 AB 2019 5w0d             Complications: Chemical pregnancy       Baby complications/comments: n/a    ROS:  Constitutional: Negative  Respiratory: Negative  Cardiovascular: Negative    Gastrointestinal: Negative    Historical Information   Past Medical History:   Diagnosis Date    Disease of thyroid gland     postpartum thyroiditis    Female infertility     Herpes         PONV (postoperative nausea and vomiting)     Varicella     had chickenpox as a child     Past Surgical History:   Procedure Laterality Date     SECTION      primary for twins, B was breech    OVARIAN CYST REMOVAL Left     dermoid cyst removed, pt still has ovary    MT SURG RX MISSED ABORTN,1ST TRI N/A 2021    Procedure: DILATATION AND EVACUATION (D&E) (8 WEEKS);   Surgeon: Gris Brown MD;  Location: [facility];  Service: Gynecology    TONSILLECTOMY  2015    WISDOM TOOTH EXTRACTION       Social History   Social History Substance and Sexual Activity   Alcohol Use No     Social History     Substance and Sexual Activity   Drug Use No     Social History     Tobacco Use   Smoking Status Never Smoker   Smokeless Tobacco Never Used     Family History:   Family History   Problem Relation Age of Onset    No Known Problems Mother     No Known Problems Father     No Known Problems Sister     COPD Maternal Grandmother     Heart disease Maternal Grandfather     Heart attack Maternal Grandfather        Meds/Allergies      Medications Prior to Admission   Medication    cholecalciferol (VITAMIN D3) 1,000 units tablet    Prenatal Vit-Fe Fumarate-FA (BL PRENATAL VITAMINS PO)    valACYclovir (VALTREX) 500 mg tablet      No Known Allergies    OBJECTIVE:    Vitals: Blood pressure 119/74, pulse 79, temperature 98 °F (36 7 °C), temperature source Oral, resp  rate 18, height 5' 6" (1 676 m), weight 104 kg (230 lb), not currently breastfeeding  Body mass index is 37 12 kg/m²  Physical Exam  Vitals and nursing note reviewed  Constitutional:       Appearance: Normal appearance  HENT:      Head: Normocephalic and atraumatic  Right Ear: External ear normal       Left Ear: External ear normal       Nose: Nose normal       Mouth/Throat:      Mouth: Mucous membranes are moist    Eyes:      Extraocular Movements: Extraocular movements intact  Cardiovascular:      Rate and Rhythm: Normal rate and regular rhythm  Heart sounds: Normal heart sounds  Pulmonary:      Effort: Pulmonary effort is normal       Breath sounds: Normal breath sounds  No wheezing  Abdominal:      Tenderness: There is no abdominal tenderness  Comments: Gravid uterus   Musculoskeletal:         General: No tenderness  Normal range of motion  Cervical back: Normal range of motion  Right lower leg: No edema  Left lower leg: No edema  Skin:     General: Skin is warm  Capillary Refill: Capillary refill takes less than 2 seconds  Neurological:      General: No focal deficit present  Mental Status: She is alert and oriented to person, place, and time     Psychiatric:         Mood and Affect: Mood normal          Behavior: Behavior normal          Fetus confirmed to be in vertex presentation by transabdominal ultrasound on admission     Cervix:     0 5/20/-4    Fetal heart rate: 130s       GBS: Positive as of 36w0d    Prenatal Labs:   Blood Type:   Lab Results   Component Value Date/Time    ABO Grouping O 03/30/2022 09:10 PM     , D (Rh type):   Lab Results   Component Value Date/Time    Rh Factor Negative 03/30/2022 09:10 PM    Rh Type Negative 01/06/2022 09:03 AM     , Antibody Screen: No results found for: ANTIBODYSCR , HCT/HGB:   Lab Results   Component Value Date/Time    Hematocrit 36 1 03/30/2022 09:10 PM    Hemoglobin 12 2 03/30/2022 09:10 PM      , MCV:   Lab Results   Component Value Date/Time    MCV 89 03/30/2022 09:10 PM      , Platelets:   Lab Results   Component Value Date/Time    Platelets 908 92/11/8391 09:10 PM      , 1 hour Glucola:   Lab Results   Component Value Date/Time    Glucose 96 01/06/2022 09:03 AM   , 3 hour GTT: No results found for: HTLYMZW4WD, Varicella:   Lab Results   Component Value Date/Time    Varicella IgG IMMUNE 04/28/2021 08:24 AM       , Rubella:   Lab Results   Component Value Date/Time    Rubella IgG Quant >175 0 04/28/2021 08:24 AM        , VDRL/RPR:   Lab Results   Component Value Date/Time    RPR Non Reactive 01/06/2022 09:03 AM    RPR Non-Reactive 04/28/2021 08:24 AM      , Urine Culture/Screen:   Lab Results   Component Value Date/Time    Urine Culture No Growth <1000 cfu/mL 04/28/2021 08:24 AM       , Urine Drug Screen: No results found for: AMPHETUR, BARBTUR, BDZUR, THCUR, COCAINEUR, METHADONEUR, OPIATEUR, PCPUR, MTHAMUR, ECSTASYUR, TRICYCLICSUR, Hep B:   Lab Results   Component Value Date/Time    Hepatitis B Surface Ag Non-reactive 04/28/2021 08:24 AM     , Hep C: No components found for: HEPCSAG, EXTHEPCSAG   , HIV:   Lab Results   Component Value Date/Time    HIV-1/HIV-2 Ab Non-Reactive 2021 08:24 AM     , Chlamydia: No results found for: EXTCHLAMYDIA  , Gonorrhea:   Lab Results   Component Value Date/Time    N gonorrhoeae, DNA Probe Negative 04/15/2021 01:26 PM     , Group B Strep:    Lab Results   Component Value Date/Time    Strep Grp B KM Positive (A) 2022 02:19 PM          Invasive Devices  Report    Peripheral Intravenous Line            Peripheral IV 22 Left;Ventral (anterior) Hand <1 day                  Assessment/Plan     ASSESSMENT:  27yo [id number] at 39w2d weeks gestation who is being admitted for IOL  PLAN:    - Admit  - CBC, RPR, Blood Type  - Start with k[facility] [address]  - GBS positive status:  PCN for prophylaxis   - Analgesia and/or epidural at patient request  - Anticipate   - Contraception: will discus with patient     Discussed with Dr Pablito Rasmussen    This patient will be an INPATIENT  and I certify the anticipated length of stay is >2 Midnights      Trisha Cornell DO  3/30/2022  10:59 PM

## 2022-03-31 NOTE — OB LABOR/OXYTOCIN SAFETY PROGRESS
Labor Progress Note - Para Cordon 32 y o  female MRN: [id number]    Unit/Bed#: -01 Encounter: [id number]       Contraction Frequency (minutes): 1 5-4 5  Contraction Quality: Mild  Tachysystole: No   Cervical Dilation: 1-2        Cervical Effacement: 30  Fetal Station: Ballotable  Baseline Rate: 130 bpm  Fetal Heart Rate: 152 BPM                  Vital Signs:   Vitals:    03/31/22 0552   BP: 118/69   Pulse: 79   Resp: 18   Temp: 97 7 °F (36 5 °C)       Notes/comments:   PROCEDURE:  DURAND BALLOON PLACEMENT    A 24F durand with a 30cc balloon was selected, SVE was performed and cervix was located, durand was introduced over sterile gloved hands  Balloon advanced through cervix beyond the internal cervical os  A small amount amount of sterile saline solution was instilled in the balloon to confirm placement  Placement was confirmed to be beyond the internal cervical os  A total of 60cc of sterile saline solution was placed into the balloon  Pt tolerated well  Instructions left with RN to place durand to gravity with a 1L bag of IV fluid  Notify MD when durand dislodged      MD Yahaira Dixon MD 3/31/2022 6:36 AM

## 2022-03-31 NOTE — PLAN OF CARE
Problem: Knowledge Deficit  Goal: Verbalizes understanding of labor plan  Description: Assess patient/family/caregiver's baseline knowledge level and ability to understand information  Provide education via patient/family/caregiver's preferred learning method at appropriate level of understanding  1  Provide teaching at level of understanding  2  Provide teaching via preferred learning method(s)  Outcome: Progressing     Problem: Labor & Delivery  Goal: Manages discomfort  Description: Assess and monitor for signs and symptoms of discomfort  Assess patient's pain level regularly and per hospital policy  Administer medications as ordered  Support use of nonpharmacological methods to help control pain such as distraction, imagery, relaxation, and application of heat and cold  Collaborate with interdisciplinary team and patient to determine appropriate pain management plan  1  Include patient in decisions related to comfort  2  Offer non-pharmacological pain management interventions  3  Report ineffective pain management to physician  Outcome: Progressing  Goal: Patient vital signs are stable  Description: 1  Assess vital signs - vaginal delivery    Outcome: Progressing     Problem: BIRTH - VAGINAL/ SECTION  Goal: Fetal and maternal status remain reassuring during the birth process  Description: INTERVENTIONS:  - Monitor vital signs  - Monitor fetal heart rate  - Monitor uterine activity  - Monitor labor progression (vaginal delivery)  - DVT prophylaxis  - Antibiotic prophylaxis  Outcome: Progressing  Goal: Emotionally satisfying birthing experience for mother/fetus  Description: Interventions:  - Assess, plan, implement and evaluate the nursing care given to the patient in labor  - Advocate the philosophy that each childbirth experience is a unique experience and support the family's chosen level of involvement and control during the labor process   - Actively participate in both the patient's and family's teaching of the birth process  - Consider cultural, Restorationist and age-specific factors and plan care for the patient in labor  Outcome: Progressing

## 2022-03-31 NOTE — ANESTHESIA PROCEDURE NOTES
Epidural Block    Patient location during procedure: OB  Start time: 3/31/2022 7:39 PM  Reason for block: at surgeon's request and primary anesthetic  Staffing  Performed: Anesthesiologist and CRNA   Anesthesiologist: Vianca Young MD  Resident/CRNA: Flako Aquino CRNA  Preanesthetic Checklist  Completed: patient identified, IV checked, site marked, risks and benefits discussed, surgical consent, monitors and equipment checked, pre-op evaluation and timeout performed  Epidural  Patient position: sitting  Prep: ChloraPrep  Patient monitoring: frequent blood pressure checks, continuous pulse ox and heart rate  Approach: midline  Location: lumbar  Injection technique: MARLYN saline  Needle  Needle type: Tuohy   Needle gauge: 18 G  Catheter type: side hole  Catheter size: 20 G  Catheter at skin depth: 11 cm  Catheter securement method: surgical tape  Test dose: negativelidocaine (PF) (XYLOCAINE-MPF) 1 % infiltration, 5 mL  lidocaine 1 5% with epinephrine 1:200,000 test dose, 3 mL  ropivacaine (NAROPIN) 0 2% epidural injection, 10 mL  Assessment  Sensory level: T10  Number of attempts: 1negative aspiration for CSF, negative aspiration for heme and no paresthesia on injection  patient tolerated the procedure well with no immediate complications

## 2022-03-31 NOTE — OB LABOR/OXYTOCIN SAFETY PROGRESS
Oxytocin Safety Progress Check Note - Ginger Hoyos 32 y o  female MRN: [id number]    Unit/Bed#: -01 Encounter: [id number]    Dose (thania-units/min) Oxytocin: 16 thania-units/min  Contraction Frequency (minutes): 2 5-3 5 ([id number])  Contraction Quality: Mild  Tachysystole: No   Cervical Dilation: 4        Cervical Effacement: 50  Fetal Station: -3  Baseline Rate: 135 bpm  Fetal Heart Rate: 138 BPM  FHR Category: Category I               Vital Signs:   Vitals:    03/31/22 1447   BP: 116/72   Pulse: 75   Resp:    Temp:    SpO2:        Notes/comments: SVE minimally changed, fetal head still very high and patient very comfortable despite being on 16 of pit, rescanned for vertex, FHT has been cat 1, will continue to increase pitocin        Abilio Pierce MD 3/31/2022 3:27 PM

## 2022-03-31 NOTE — OB LABOR/OXYTOCIN SAFETY PROGRESS
Oxytocin Safety Progress Check Note - Livia Tellez 32 y o  female MRN: [id number]    Unit/Bed#: -01 Encounter: [id number]    Dose (thania-units/min) Oxytocin: 10 thania-units/min  Contraction Frequency (minutes): 2-4  Contraction Quality: Mild  Tachysystole: No   Cervical Dilation: 3-4        Cervical Effacement: 50  Fetal Station: -3  Baseline Rate: 130 bpm  Fetal Heart Rate: 120 BPM  FHR Category: Category I               Vital Signs:   Vitals:    03/31/22 1230   BP: 113/63   Pulse: 65   Resp:    Temp:    SpO2:        Notes/comments:  SVE unchanged, fetal heart tracing has been category 1, Pitocin increased from 8-10, patient desiring to labor without epidural, will recheck in 2 hours or sooner if clinically indicated    D/w Dr Afua Gomez MD 3/31/2022 12:46 PM

## 2022-03-31 NOTE — OB LABOR/OXYTOCIN SAFETY PROGRESS
Oxytocin Safety Progress Check Note - Jan Rodriguez 32 y o  female MRN: [id number]    Unit/Bed#: -01 Encounter: [id number]    Dose (thania-units/min) Oxytocin: 20 thania-units/min  Contraction Frequency (minutes): 1-4  Contraction Quality: Mild  Tachysystole: No   Cervical Dilation: 4        Cervical Effacement: 60  Fetal Station: -3  Baseline Rate: 140 bpm  Fetal Heart Rate: 152 BPM  FHR Category: Category I  Oxytocin Safety Progress Check: Safety check completed            Vital Signs:   Vitals:    03/31/22 1647   BP: 118/76   Pulse: 82   Resp:    Temp:    SpO2:        Notes/comments:   Starting to feel contractions  AROM - clear  Continue pitocin        Pema Arriaga MD 3/31/2022 5:01 PM

## 2022-03-31 NOTE — OB LABOR/OXYTOCIN SAFETY PROGRESS
Labor Progress Note Shiloh Johnson 32 y o  female MRN: [id number]    Unit/Bed#: -01 Encounter: [id number]       Contraction Frequency (minutes): 1 5-8  Contraction Quality: Mild  Tachysystole: No   Cervical Dilation: Fingertip        Cervical Effacement: 20  Fetal Station: Ballotable  Baseline Rate: 125 bpm  Fetal Heart Rate: 132 BPM                  Vital Signs:   Vitals:    03/31/22 0231   BP: 131/79   Pulse: 74   Resp: 20   Temp: 98 4 °F (36 9 °C)       Notes/comments:   FB in place  FHT category 1  Ctx q1-2m  Continue to monitor          Adalberto Flores MD 3/31/2022 4:33 AM

## 2022-03-31 NOTE — OB LABOR/OXYTOCIN SAFETY PROGRESS
Labor Progress Note - Jan Rodriguez 32 y o  female MRN: [id number]    Unit/Bed#: -01 Encounter: [id number]       Contraction Frequency (minutes): irritability (x1 cntrx)  Contraction Quality: Mild  Tachysystole: No   Cervical Dilation: 3-4        Cervical Effacement: 50  Fetal Station: -3  Baseline Rate: 135 bpm  Fetal Heart Rate: 170 BPM  FHR Category: Category I               Vital Signs:   Vitals:    03/31/22 0715   BP:    Pulse:    Resp:    Temp: 98 4 °F (36 9 °C)       Notes/comments:   Comfortable    Hernández balloon fell out  Will start pitocin     Mackenzie Guevara MD 3/31/2022 9:32 AM

## 2022-03-31 NOTE — ANESTHESIA PREPROCEDURE EVALUATION
Procedure:  LABOR ANALGESIA    Relevant Problems   ANESTHESIA   (+) PONV (postoperative nausea and vomiting)      GYN   (+) 39 weeks gestation of pregnancy   (+) Pregnancy with history of  section, antepartum      Other   (+) COVID-19 affecting pregnancy in second trimester   (+) Desires  (vaginal birth after ) trial        Physical Exam    Airway    Mallampati score: I  TM Distance: >3 FB  Neck ROM: full     Dental       Cardiovascular      Pulmonary      Other Findings        Anesthesia Plan  ASA Score- 2     Anesthesia Type- epidural with ASA Monitors  Additional Monitors:   Airway Plan:           Plan Factors-    Chart reviewed  Existing labs reviewed  Patient summary reviewed  Patient is not a current smoker  Induction-     Postoperative Plan-     Informed Consent- Anesthetic plan and risks discussed with patient  I personally reviewed this patient with the CRNA  Discussed and agreed on the Anesthesia Plan with the CRNA  Nessa Stock

## 2022-03-31 NOTE — QUICK NOTE
PROCEDURE:  DURAND BALLOON PLACEMENT    A 24F durand with a 30cc balloon was selected, SVE was performed and cervix was located, durand was introduced over sterile gloved hands  Balloon advanced through cervix beyond the internal cervical os  A small amount amount of sterile saline solution was instilled in the balloon to confirm placement  Placement was confirmed to be beyond the internal cervical os  A total of 60cc of sterile saline solution was placed into the balloon  Pt tolerated well  Instructions left with RN to place durand to gravity with a 1L bag of IV fluid  Notify MD when durand dislodged      Alanna Libman, MD

## 2022-03-31 NOTE — PLAN OF CARE
Problem: Knowledge Deficit  Goal: Verbalizes understanding of labor plan  Description: Assess patient/family/caregiver's baseline knowledge level and ability to understand information  Provide education via patient/family/caregiver's preferred learning method at appropriate level of understanding  1  Provide teaching at level of understanding  2  Provide teaching via preferred learning method(s)  Outcome: Progressing  Goal: Patient/family/caregiver demonstrates understanding of disease process, treatment plan, medications, and discharge instructions  Description: Complete learning assessment and assess knowledge base  Interventions:  - Provide teaching at level of understanding  - Provide teaching via preferred learning methods  Outcome: Progressing     Problem: Labor & Delivery  Goal: Manages discomfort  Description: Assess and monitor for signs and symptoms of discomfort  Assess patient's pain level regularly and per hospital policy  Administer medications as ordered  Support use of nonpharmacological methods to help control pain such as distraction, imagery, relaxation, and application of heat and cold  Collaborate with interdisciplinary team and patient to determine appropriate pain management plan  1  Include patient in decisions related to comfort  2  Offer non-pharmacological pain management interventions  3  Report ineffective pain management to physician  Outcome: Progressing  Goal: Patient vital signs are stable  Description: 1  Assess vital signs - vaginal delivery    Outcome: Progressing     Problem: BIRTH - VAGINAL/ SECTION  Goal: Fetal and maternal status remain reassuring during the birth process  Description: INTERVENTIONS:  - Monitor vital signs  - Monitor fetal heart rate  - Monitor uterine activity  - Monitor labor progression (vaginal delivery)  - DVT prophylaxis  - Antibiotic prophylaxis  Outcome: Progressing  Goal: Emotionally satisfying birthing experience for mother/fetus  Description: Interventions:  - Assess, plan, implement and evaluate the nursing care given to the patient in labor  - Advocate the philosophy that each childbirth experience is a unique experience and support the family's chosen level of involvement and control during the labor process   - Actively participate in both the patient's and family's teaching of the birth process  - Consider cultural, Methodist and age-specific factors and plan care for the patient in labor  Outcome: Progressing     Problem: PAIN - ADULT  Goal: Verbalizes/displays adequate comfort level or baseline comfort level  Description: Interventions:  - Encourage patient to monitor pain and request assistance  - Assess pain using appropriate pain scale  - Administer analgesics based on type and severity of pain and evaluate response  - Implement non-pharmacological measures as appropriate and evaluate response  - Consider cultural and social influences on pain and pain management  - Notify physician/advanced practitioner if interventions unsuccessful or patient reports new pain  Outcome: Progressing     Problem: INFECTION - ADULT  Goal: Absence or prevention of progression during hospitalization  Description: INTERVENTIONS:  - Assess and monitor for signs and symptoms of infection  - Monitor lab/diagnostic results  - Monitor all insertion sites, i e  indwelling lines, tubes, and drains  - Monitor endotracheal if appropriate and nasal secretions for changes in amount and color  - [place] appropriate cooling/warming therapies per order  - Administer medications as ordered  - Instruct and encourage patient and family to use good hand hygiene technique  - Identify and instruct in appropriate isolation precautions for identified infection/condition  Outcome: Progressing  Goal: Absence of fever/infection during neutropenic period  Description: INTERVENTIONS:  - Monitor WBC    Outcome: Progressing     Problem: SAFETY ADULT  Goal: Patient will remain free of falls  Description: INTERVENTIONS:  - Educate patient/family on patient safety including physical limitations  - Instruct patient to call for assistance with activity   - Consult OT/PT to assist with strengthening/mobility   - Keep Call bell within reach  - Keep bed low and locked with side rails adjusted as appropriate  - Keep care items and personal belongings within reach  - Initiate and maintain comfort rounds  - Make Fall Risk Sign visible to staff  - Offer Toileting every  Hours, in advance of need  - Initiate/Maintain alarm  - Obtain necessary fall risk management equipment:   - Apply yellow socks and bracelet for high fall risk patients  - Consider moving patient to room near nurses station  Outcome: Progressing  Goal: Maintain or return to baseline ADL function  Description: INTERVENTIONS:  -  Assess patient's ability to carry out ADLs; assess patient's baseline for ADL function and identify physical deficits which impact ability to perform ADLs (bathing, care of mouth/teeth, toileting, grooming, dressing, etc )  - Assess/evaluate cause of self-care deficits   - Assess range of motion  - Assess patient's mobility; develop plan if impaired  - Assess patient's need for assistive devices and provide as appropriate  - Encourage maximum independence but intervene and supervise when necessary  - Involve family in performance of ADLs  - Assess for home care needs following discharge   - Consider OT consult to assist with ADL evaluation and planning for discharge  - Provide patient education as appropriate  Outcome: Progressing  Goal: Maintains/Returns to pre admission functional level  Description: INTERVENTIONS:  - Perform BMAT or MOVE assessment daily    - Set and communicate daily mobility goal to care team and patient/family/caregiver  - Collaborate with rehabilitation services on mobility goals if consulted  - Perform Range of Motion  times a day  - Reposition patient every  hours    - Dangle patient  times a day  - Stand patient  times a day  - Ambulate patient times a day  - Out of bed to chair  times a day   - Out of bed for meals  times a day  - Out of bed for toileting  - Record patient progress and toleration of activity level   Outcome: Progressing     Problem: DISCHARGE PLANNING  Goal: Discharge to home or other facility with appropriate resources  Description: INTERVENTIONS:  - Identify barriers to discharge w/patient and caregiver  - Arrange for needed discharge resources and transportation as appropriate  - Identify discharge learning needs (meds, wound care, etc )  - Arrange for interpretive services to assist at discharge as needed  - Refer to Case Management Department for coordinating discharge planning if the patient needs post-hospital services based on physician/advanced practitioner order or complex needs related to functional status, cognitive ability, or social support system  Outcome: Progressing

## 2022-03-31 NOTE — PLAN OF CARE
Problem: Knowledge Deficit  Goal: Verbalizes understanding of labor plan  Description: Assess patient/family/caregiver's baseline knowledge level and ability to understand information  Provide education via patient/family/caregiver's preferred learning method at appropriate level of understanding  1  Provide teaching at level of understanding  2  Provide teaching via preferred learning method(s)  Outcome: Progressing     Problem: Labor & Delivery  Goal: Manages discomfort  Description: Assess and monitor for signs and symptoms of discomfort  Assess patient's pain level regularly and per hospital policy  Administer medications as ordered  Support use of nonpharmacological methods to help control pain such as distraction, imagery, relaxation, and application of heat and cold  Collaborate with interdisciplinary team and patient to determine appropriate pain management plan  1  Include patient in decisions related to comfort  2  Offer non-pharmacological pain management interventions  3  Report ineffective pain management to physician  Outcome: Progressing  Goal: Patient vital signs are stable  Description: 1  Assess vital signs - vaginal delivery    Outcome: Progressing     Problem: BIRTH - VAGINAL/ SECTION  Goal: Fetal and maternal status remain reassuring during the birth process  Description: INTERVENTIONS:  - Monitor vital signs  - Monitor fetal heart rate  - Monitor uterine activity  - Monitor labor progression (vaginal delivery)  - DVT prophylaxis  - Antibiotic prophylaxis  Outcome: Progressing  Goal: Emotionally satisfying birthing experience for mother/fetus  Description: Interventions:  - Assess, plan, implement and evaluate the nursing care given to the patient in labor  - Advocate the philosophy that each childbirth experience is a unique experience and support the family's chosen level of involvement and control during the labor process   - Actively participate in both the patient's and family's teaching of the birth process  - Consider cultural, [place] and age-specific factors and plan care for the patient in labor  Outcome: Progressing

## 2022-03-31 NOTE — OB LABOR/OXYTOCIN SAFETY PROGRESS
Labor Progress Note Lina Choe 32 y o  female MRN: [id number]    Unit/Bed#: -01 Encounter: [id number]       Contraction Frequency (minutes): irregular  Contraction Quality: Mild  Tachysystole: No   Cervical Dilation: Fingertip        Cervical Effacement: 20  Fetal Station: Ballotable  Baseline Rate: 130 bpm  Fetal Heart Rate: 130 BPM                  Vital Signs:   Vitals:    03/30/22 2046   BP: 119/74   Pulse: 79   Resp: 18   Temp: 98 °F (36 7 °C)       Notes/comments:   FB in place  FHT category 1  [place] with uterine irritability  Continue to monitor          Taras Opitz, MD 3/31/2022 1:57 AM

## 2022-03-31 NOTE — OB LABOR/OXYTOCIN SAFETY PROGRESS
Oxytocin Safety Progress Check Note - Jan Rodriguez 32 y o  female MRN: [id number]    Unit/Bed#: -01 Encounter: [id number]    Dose (thania-units/min) Oxytocin: 24 thania-units/min  Contraction Frequency (minutes): 1-3  Contraction Quality: Moderate  Tachysystole: No   Cervical Dilation: 4        Cervical Effacement: 60  Fetal Station: -3  Baseline Rate: 140 bpm  Fetal Heart Rate: 132 BPM  FHR Category: Category I  Oxytocin Safety Progress Check: Safety check completed            Vital Signs:   Vitals:    03/31/22 1848   BP: 134/69   Pulse: 80   Resp:    Temp:    SpO2:        Notes/comments:   SVE unchanged  FHT category 1  Plan for epidural for analgesia  Dr Ray Ramírez aware          Megan Carrillo MD 3/31/2022 7:19 PM

## 2022-03-31 NOTE — OB LABOR/OXYTOCIN SAFETY PROGRESS
Labor Progress Note Renae Kern 32 y o  female MRN: [id number]    Unit/Bed#: -01 Encounter: [id number]       Contraction Frequency (minutes): 1-5 5  Contraction Quality: Mild  Tachysystole: No   Cervical Dilation: 1-2        Cervical Effacement: 30  Fetal Station: Ballotable  Baseline Rate: 140 bpm  Fetal Heart Rate: 142 BPM                  Vital Signs:   Vitals:    22 0231   BP: 131/79   Pulse: 74   Resp: 20   Temp: 98 4 °F (36 9 °C)       Notes/comments:   SVE 1-2//-4  FHT category 1  [place] q1-5m  Consider repeat Hernández balloon vs  Pitocin           Eloy Chavez MD 3/31/2022 5:47 AM

## 2022-04-01 LAB
ABO GROUP BLD: NORMAL
BASE EXCESS BLDCOA CALC-SCNC: -8.4 MMOL/L (ref 3–11)
BASE EXCESS BLDCOV CALC-SCNC: -5.3 MMOL/L (ref 1–9)
BASOPHILS # BLD AUTO: 0.03 THOUSANDS/ΜL (ref 0–0.1)
BASOPHILS NFR BLD AUTO: 0 % (ref 0–1)
BLD GP AB SCN SERPL QL: POSITIVE
EOSINOPHIL # BLD AUTO: 0 THOUSAND/ΜL (ref 0–0.61)
EOSINOPHIL NFR BLD AUTO: 0 % (ref 0–6)
ERYTHROCYTE [DISTWIDTH] IN BLOOD BY AUTOMATED COUNT: 13.8 % (ref 11.6–15.1)
FETAL CELL SCN BLD QL ROSETTE: NEGATIVE
HCO3 BLDCOA-SCNC: 20.5 MMOL/L (ref 17.3–27.3)
HCO3 BLDCOV-SCNC: 19.7 MMOL/L (ref 12.2–28.6)
HCT VFR BLD AUTO: 33.4 % (ref 34.8–46.1)
HGB BLD-MCNC: 11.2 G/DL (ref 11.5–15.4)
IMM GRANULOCYTES # BLD AUTO: 0.1 THOUSAND/UL (ref 0–0.2)
IMM GRANULOCYTES NFR BLD AUTO: 1 % (ref 0–2)
LYMPHOCYTES # BLD AUTO: 1.1 THOUSANDS/ΜL (ref 0.6–4.47)
LYMPHOCYTES NFR BLD AUTO: 6 % (ref 14–44)
MCH RBC QN AUTO: 29.9 PG (ref 26.8–34.3)
MCHC RBC AUTO-ENTMCNC: 33.5 G/DL (ref 31.4–37.4)
MCV RBC AUTO: 89 FL (ref 82–98)
MONOCYTES # BLD AUTO: 1.24 THOUSAND/ΜL (ref 0.17–1.22)
MONOCYTES NFR BLD AUTO: 7 % (ref 4–12)
NEUTROPHILS # BLD AUTO: 15.63 THOUSANDS/ΜL (ref 1.85–7.62)
NEUTS SEG NFR BLD AUTO: 86 % (ref 43–75)
NRBC BLD AUTO-RTO: 0 /100 WBCS
O2 CT VFR BLDCOA CALC: 12.3 ML/DL
OXYHGB MFR BLDCOA: 61.3 %
OXYHGB MFR BLDCOV: 71.8 %
PCO2 BLDCOA: 56.1 MM[HG] (ref 30–60)
PCO2 BLDCOV: 36.8 MM HG (ref 27–43)
PH BLDCOA: 7.18 [PH] (ref 7.23–7.43)
PH BLDCOV: 7.35 [PH] (ref 7.19–7.49)
PLATELET # BLD AUTO: 186 THOUSANDS/UL (ref 149–390)
PMV BLD AUTO: 10.4 FL (ref 8.9–12.7)
PO2 BLDCOA: 31.9 MM HG (ref 5–25)
PO2 BLDCOV: 31 MM HG (ref 15–45)
RBC # BLD AUTO: 3.75 MILLION/UL (ref 3.81–5.12)
RH BLD: NEGATIVE
SAO2 % BLDCOV: 13.7 ML/DL
WBC # BLD AUTO: 18.1 THOUSAND/UL (ref 4.31–10.16)

## 2022-04-01 PROCEDURE — 99024 POSTOP FOLLOW-UP VISIT: CPT | Performed by: OBSTETRICS & GYNECOLOGY

## 2022-04-01 PROCEDURE — 86850 RBC ANTIBODY SCREEN: CPT

## 2022-04-01 PROCEDURE — 85025 COMPLETE CBC W/AUTO DIFF WBC: CPT | Performed by: OBSTETRICS & GYNECOLOGY

## 2022-04-01 PROCEDURE — 82805 BLOOD GASES W/O2 SATURATION: CPT | Performed by: OBSTETRICS & GYNECOLOGY

## 2022-04-01 PROCEDURE — 85461 HEMOGLOBIN FETAL: CPT

## 2022-04-01 PROCEDURE — 86901 BLOOD TYPING SEROLOGIC RH(D): CPT

## 2022-04-01 PROCEDURE — 86900 BLOOD TYPING SEROLOGIC ABO: CPT

## 2022-04-01 PROCEDURE — 87340 HEPATITIS B SURFACE AG IA: CPT | Performed by: OBSTETRICS & GYNECOLOGY

## 2022-04-01 RX ORDER — ONDANSETRON 2 MG/ML
4 INJECTION INTRAMUSCULAR; INTRAVENOUS EVERY 8 HOURS PRN
Status: DISCONTINUED | OUTPATIENT
Start: 2022-04-01 | End: 2022-04-02 | Stop reason: HOSPADM

## 2022-04-01 RX ORDER — DIAPER,BRIEF,INFANT-TODD,DISP
1 EACH MISCELLANEOUS DAILY PRN
Status: DISCONTINUED | OUTPATIENT
Start: 2022-04-01 | End: 2022-04-02 | Stop reason: HOSPADM

## 2022-04-01 RX ORDER — OXYTOCIN/RINGER'S LACTATE 30/500 ML
250 PLASTIC BAG, INJECTION (ML) INTRAVENOUS ONCE
Status: DISCONTINUED | OUTPATIENT
Start: 2022-04-01 | End: 2022-04-02 | Stop reason: HOSPADM

## 2022-04-01 RX ORDER — METHYLERGONOVINE MALEATE 0.2 MG/ML
0.2 INJECTION INTRAVENOUS ONCE
Status: COMPLETED | OUTPATIENT
Start: 2022-04-01 | End: 2022-04-01

## 2022-04-01 RX ORDER — CARBOPROST TROMETHAMINE 250 UG/ML
INJECTION, SOLUTION INTRAMUSCULAR
Status: DISPENSED
Start: 2022-04-01 | End: 2022-04-01

## 2022-04-01 RX ORDER — MISOPROSTOL 200 UG/1
1000 TABLET ORAL ONCE
Status: COMPLETED | OUTPATIENT
Start: 2022-04-01 | End: 2022-04-01

## 2022-04-01 RX ORDER — METHYLERGONOVINE MALEATE 0.2 MG/ML
INJECTION INTRAVENOUS
Status: COMPLETED
Start: 2022-04-01 | End: 2022-04-01

## 2022-04-01 RX ORDER — IBUPROFEN 600 MG/1
600 TABLET ORAL EVERY 6 HOURS
Status: DISCONTINUED | OUTPATIENT
Start: 2022-04-01 | End: 2022-04-02 | Stop reason: HOSPADM

## 2022-04-01 RX ORDER — SIMETHICONE 80 MG
80 TABLET,CHEWABLE ORAL 4 TIMES DAILY PRN
Status: DISCONTINUED | OUTPATIENT
Start: 2022-04-01 | End: 2022-04-02 | Stop reason: HOSPADM

## 2022-04-01 RX ORDER — ACETAMINOPHEN 325 MG/1
650 TABLET ORAL EVERY 4 HOURS PRN
Status: DISCONTINUED | OUTPATIENT
Start: 2022-04-01 | End: 2022-04-02 | Stop reason: HOSPADM

## 2022-04-01 RX ORDER — DOCUSATE SODIUM 100 MG/1
100 CAPSULE, LIQUID FILLED ORAL 2 TIMES DAILY
Status: DISCONTINUED | OUTPATIENT
Start: 2022-04-01 | End: 2022-04-02 | Stop reason: HOSPADM

## 2022-04-01 RX ORDER — CALCIUM CARBONATE 200(500)MG
1000 TABLET,CHEWABLE ORAL DAILY PRN
Status: DISCONTINUED | OUTPATIENT
Start: 2022-04-01 | End: 2022-04-02 | Stop reason: HOSPADM

## 2022-04-01 RX ORDER — OXYTOCIN/RINGER'S LACTATE 30/500 ML
62.5 PLASTIC BAG, INJECTION (ML) INTRAVENOUS ONCE
Status: COMPLETED | OUTPATIENT
Start: 2022-04-01 | End: 2022-04-02

## 2022-04-01 RX ORDER — DIPHENHYDRAMINE HCL 25 MG
25 TABLET ORAL EVERY 6 HOURS PRN
Status: DISCONTINUED | OUTPATIENT
Start: 2022-04-01 | End: 2022-04-02 | Stop reason: HOSPADM

## 2022-04-01 RX ADMIN — Medication 62.5 MILLI-UNITS/MIN: at 12:40

## 2022-04-01 RX ADMIN — METHYLERGONOVINE MALEATE 0.2 MG: 0.2 INJECTION INTRAVENOUS at 07:35

## 2022-04-01 RX ADMIN — SODIUM CHLORIDE 3 G: 9 INJECTION, SOLUTION INTRAVENOUS at 10:02

## 2022-04-01 RX ADMIN — ROPIVACAINE HYDROCHLORIDE: 2 INJECTION, SOLUTION EPIDURAL; INFILTRATION at 03:16

## 2022-04-01 RX ADMIN — Medication 62.5 MILLI-UNITS/MIN: at 08:29

## 2022-04-01 RX ADMIN — SODIUM CHLORIDE 2.5 MILLION UNITS: 9 INJECTION, SOLUTION INTRAVENOUS at 06:34

## 2022-04-01 RX ADMIN — Medication 250 MILLI-UNITS/MIN: at 07:20

## 2022-04-01 RX ADMIN — IBUPROFEN 600 MG: 600 TABLET ORAL at 15:42

## 2022-04-01 RX ADMIN — SODIUM CHLORIDE, SODIUM LACTATE, POTASSIUM CHLORIDE, AND CALCIUM CHLORIDE 125 ML/HR: .6; .31; .03; .02 INJECTION, SOLUTION INTRAVENOUS at 03:16

## 2022-04-01 RX ADMIN — IBUPROFEN 600 MG: 600 TABLET ORAL at 22:34

## 2022-04-01 RX ADMIN — TRANEXAMIC ACID 1000 MG: 1 INJECTION, SOLUTION INTRAVENOUS at 07:35

## 2022-04-01 RX ADMIN — DOCUSATE SODIUM 100 MG: 100 CAPSULE, LIQUID FILLED ORAL at 19:04

## 2022-04-01 RX ADMIN — MISOPROSTOL 1000 MCG: 100 TABLET ORAL at 07:44

## 2022-04-01 NOTE — QUICK NOTE
Requested by neonatology to draw Hep B as we cannot find Mom's hep B labs (drawn through [facility], media tab queried) and Anniece Osgood has refused Hep B vaccine for baby  She is agreeable to lab draw  Feeling crampy with Mike Ma in place, will unhook from suction and monitor for 30 min   Dr Aurelio Saeed aware

## 2022-04-01 NOTE — PLAN OF CARE
Problem: PAIN - ADULT  Goal: Verbalizes/displays adequate comfort level or baseline comfort level  Description: Interventions:  - Encourage patient to monitor pain and request assistance  - Assess pain using appropriate pain scale  - Administer analgesics based on type and severity of pain and evaluate response  - Implement non-pharmacological measures as appropriate and evaluate response  - Consider cultural and social influences on pain and pain management  - Notify physician/advanced practitioner if interventions unsuccessful or patient reports new pain  Outcome: Progressing     Problem: INFECTION - ADULT  Goal: Absence or prevention of progression during hospitalization  Description: INTERVENTIONS:  - Assess and monitor for signs and symptoms of infection  - Monitor lab/diagnostic results  - Monitor all insertion sites, i e  indwelling lines, tubes, and drains  - Monitor endotracheal if appropriate and nasal secretions for changes in amount and color  - [place] appropriate cooling/warming therapies per order  - Administer medications as ordered  - Instruct and encourage patient and family to use good hand hygiene technique  - Identify and instruct in appropriate isolation precautions for identified infection/condition  Outcome: Progressing  Goal: Absence of fever/infection during neutropenic period  Description: INTERVENTIONS:  - Monitor WBC    Outcome: Progressing     Problem: SAFETY ADULT  Goal: Patient will remain free of falls  Description: INTERVENTIONS:  - Educate patient/family on patient safety including physical limitations  - Instruct patient to call for assistance with activity   - Consult OT/PT to assist with strengthening/mobility   - Keep Call bell within reach  - Keep bed low and locked with side rails adjusted as appropriate  - Keep care items and personal belongings within reach  - Initiate and maintain comfort rounds  - Make Fall Risk Sign visible to staff  - Offer Toileting every  Hours, in advance of need  - Initiate/Maintain alarm  - Obtain necessary fall risk management equipment:   - Apply yellow socks and bracelet for high fall risk patients  - Consider moving patient to room near nurses station  Outcome: Progressing  Goal: Maintain or return to baseline ADL function  Description: INTERVENTIONS:  -  Assess patient's ability to carry out ADLs; assess patient's baseline for ADL function and identify physical deficits which impact ability to perform ADLs (bathing, care of mouth/teeth, toileting, grooming, dressing, etc )  - Assess/evaluate cause of self-care deficits   - Assess range of motion  - Assess patient's mobility; develop plan if impaired  - Assess patient's need for assistive devices and provide as appropriate  - Encourage maximum independence but intervene and supervise when necessary  - Involve family in performance of ADLs  - Assess for home care needs following discharge   - Consider OT consult to assist with ADL evaluation and planning for discharge  - Provide patient education as appropriate  Outcome: Progressing  Goal: Maintains/Returns to pre admission functional level  Description: INTERVENTIONS:  - Perform BMAT or MOVE assessment daily    - Set and communicate daily mobility goal to care team and patient/family/caregiver  - Collaborate with rehabilitation services on mobility goals if consulted  - Perform Range of Motion  times a day  - Reposition patient every  hours    - Dangle patient  times a day  - Stand patient  times a day  - Ambulate patient  times a day  - Out of bed to chair  times a day   - Out of bed for meals  times a day  - Out of bed for toileting  - Record patient progress and toleration of activity level   Outcome: Progressing     Problem: DISCHARGE PLANNING  Goal: Discharge to home or other facility with appropriate resources  Description: INTERVENTIONS:  - Identify barriers to discharge w/patient and caregiver  - Arrange for needed discharge resources and transportation as appropriate  - Identify discharge learning needs (meds, wound care, etc )  - Arrange for interpretive services to assist at discharge as needed  - Refer to Case Management Department for coordinating discharge planning if the patient needs post-hospital services based on physician/advanced practitioner order or complex needs related to functional status, cognitive ability, or social support system  Outcome: Progressing     Problem: POSTPARTUM  Goal: Experiences normal postpartum course  Description: INTERVENTIONS:  - Monitor maternal vital signs  - Assess uterine involution and lochia  Outcome: Progressing  Goal: Appropriate maternal -  bonding  Description: INTERVENTIONS:  - Identify family support  - Assess for appropriate maternal/infant bonding   -Encourage maternal/infant bonding opportunities  - Referral to  or  as needed  Outcome: Progressing  Goal: Establishment of infant feeding pattern  Description: INTERVENTIONS:  - Assess breast/bottle feeding  - Refer to lactation as needed  Outcome: Progressing  Goal: Incision(s), wounds(s) or drain site(s) healing without S/S of infection  Description: INTERVENTIONS  - Assess and document dressing, incision, wound bed, drain sites and surrounding tissue  - Provide patient and family education  - Perform skin care/dressing changes every  Outcome: Progressing

## 2022-04-01 NOTE — OB LABOR/OXYTOCIN SAFETY PROGRESS
Oxytocin Safety Progress Check Note - Jan Rodriguez 32 y o  female MRN: [id number]    Unit/Bed#: -01 Encounter: [id number]    Dose (thania-units/min) Oxytocin: 24 thania-units/min  Contraction Frequency (minutes): 2-4  Contraction Quality: Moderate  Tachysystole: No   Cervical Dilation: 4        Cervical Effacement: 80  Fetal Station: -1  Baseline Rate: 125 bpm  Fetal Heart Rate: 145 BPM  FHR Category: Category I  Oxytocin Safety Progress Check: Safety check completed            Vital Signs:   Vitals:    03/31/22 2100   BP: 112/61   Pulse:    Resp:    Temp:    SpO2:        Notes/comments:   SVE 4/80/-1  IUPC placed for contraction strength monitoring  FHT category 1  Plan to monitor for adequate MVUs          Davidson Kruger MD 3/31/2022 10:00 PM

## 2022-04-01 NOTE — OB LABOR/OXYTOCIN SAFETY PROGRESS
Oxytocin Safety Progress Check Note - Jan Rodriguez 32 y o  female MRN: [id number]    Unit/Bed#: -01 Encounter: [id number]    Dose (thania-units/min) Oxytocin: 30 thania-units/min  Contraction Frequency (minutes): 1-5  Contraction Quality: Moderate  Tachysystole: No   Cervical Dilation: 7-8        Cervical Effacement: 80  Fetal Station: 0  Baseline Rate: 130 bpm  Fetal Heart Rate: 140 BPM  FHR Category: Category I  Oxytocin Safety Progress Check: Safety check completed            Vital Signs:   Vitals:    04/01/22 0100   BP: 114/56   Pulse:    Resp:    Temp:    SpO2:        Notes/comments:   SVE 7-8/80/0  FHT category 1  Pitocin at 30 mU/min   Plan for SVE in 2h       Yahaira Masterson MD 4/1/2022 2:05 AM

## 2022-04-01 NOTE — OB LABOR/OXYTOCIN SAFETY PROGRESS
Oxytocin Safety Progress Check Note - Jan Rodriguez 32 y o  female MRN: [id number]    Unit/Bed#: -01 Encounter: [id number]    Dose (thania-units/min) Oxytocin: 30 thania-units/min  Contraction Frequency (minutes): 2-3  Contraction Quality: Moderate  Tachysystole: No   Cervical Dilation: 5-6        Cervical Effacement: 80  Fetal Station: -1  Baseline Rate: 125 bpm  Fetal Heart Rate: 135 BPM  FHR Category: Category I  Oxytocin Safety Progress Check: Safety check completed            Vital Signs:   Vitals:    04/01/22 0000   BP: 122/67   Pulse:    Resp:    Temp:    SpO2:        Notes/comments:   SVE 5 5/80/-1  MVUs adequate  FHT category 1  [place] at 30 mU/min  Plan for pitocin break if unchanged at next check          Robbi Stack MD 4/1/2022 12:16 AM

## 2022-04-01 NOTE — PLAN OF CARE
Problem: Knowledge Deficit  Goal: Verbalizes understanding of labor plan  Description: Assess patient/family/caregiver's baseline knowledge level and ability to understand information  Provide education via patient/family/caregiver's preferred learning method at appropriate level of understanding  1  Provide teaching at level of understanding  2  Provide teaching via preferred learning method(s)  Outcome: Completed  Goal: Patient/family/caregiver demonstrates understanding of disease process, treatment plan, medications, and discharge instructions  Description: Complete learning assessment and assess knowledge base  Interventions:  - Provide teaching at level of understanding  - Provide teaching via preferred learning methods  Outcome: Completed     Problem: Labor & Delivery  Goal: Manages discomfort  Description: Assess and monitor for signs and symptoms of discomfort  Assess patient's pain level regularly and per hospital policy  Administer medications as ordered  Support use of nonpharmacological methods to help control pain such as distraction, imagery, relaxation, and application of heat and cold  Collaborate with interdisciplinary team and patient to determine appropriate pain management plan  1  Include patient in decisions related to comfort  2  Offer non-pharmacological pain management interventions  3  Report ineffective pain management to physician  Outcome: Completed  Goal: Patient vital signs are stable  Description: 1  Assess vital signs - vaginal delivery    Outcome: Completed     Problem: BIRTH - VAGINAL/ SECTION  Goal: Fetal and maternal status remain reassuring during the birth process  Description: INTERVENTIONS:  - Monitor vital signs  - Monitor fetal heart rate  - Monitor uterine activity  - Monitor labor progression (vaginal delivery)  - DVT prophylaxis  - Antibiotic prophylaxis  Outcome: Completed  Goal: Emotionally satisfying birthing experience for mother/fetus  Description: Interventions:  - Assess, plan, implement and evaluate the nursing care given to the patient in labor  - Advocate the philosophy that each childbirth experience is a unique experience and support the family's chosen level of involvement and control during the labor process   - Actively participate in both the patient's and family's teaching of the birth process  - Consider cultural, Sikhism and age-specific factors and plan care for the patient in labor  Outcome: Completed

## 2022-04-01 NOTE — PROGRESS NOTES
BAKARI device removed per 's instructions while holding uterine fundus at umbilicus  Firm  Patient tolerated well  Bleeding stable

## 2022-04-01 NOTE — OB LABOR/OXYTOCIN SAFETY PROGRESS
Oxytocin Safety Progress Check Note - Leila Aragon 32 y o  female MRN: [id number]    Unit/Bed#: -01 Encounter: [id number]    Dose (thania-units/min) Oxytocin: 30 thania-units/min  Contraction Frequency (minutes): 2-3  Contraction Quality: Moderate  Tachysystole: No   Cervical Dilation: Lip/rim (Comment)        Cervical Effacement: 100  Fetal Station: 0  Baseline Rate: 130 bpm  Fetal Heart Rate: 145 BPM  FHR Category: Category II  Oxytocin Safety Progress Check: Safety check completed            Vital Signs:   Vitals:    04/01/22 0345   BP: 98/50   Pulse:    Resp:    Temp:    SpO2:        Notes/comments:   Occ late decels, mod variability, pos acels  Attempted to reduce cervix with pushing, cervix not completely reduced  Lates resolved  Will labor down     Omero Dupree MD 4/1/2022 4:12 AM

## 2022-04-01 NOTE — DISCHARGE SUMMARY
Discharge Summary - Genevieve Rothman 32 y o  female MRN: [id number]    Unit/Bed#: -01 Encounter: [id number]    Admission Date: 3/30/2022     Discharge Date: 2022    Admitting Diagnosis:   Patient Active Problem List   Diagnosis    Pregnancy resulting from in vitro fertilization in third trimester    PONV (postoperative nausea and vomiting)    Pregnancy with history of  section, antepartum    COVID-19 affecting pregnancy in second trimester    Spontaneous vaginal delivery    Desires  (vaginal birth after ) trial    HSV infection       Discharge Diagnosis:   Same, delivered    Procedures:   vaginal birth after  ()    Admitting Attending: Dr Isaac Reyes  Delivery Attending: Dr Juliane Hung  Discharge Attending: Dr Eduin Corrales Course:     Genevieve Rothman is a 32 y o  [id number] who was admitted at 39w4d for induction of labor  She had 1 prior  section for a twin pregnancy  She received 2 Hernández balloons  She was then started on Pitocin  She was artificially ruptured for clear fluid at 5:10 p m  on 3/31  She then received an epidural   With Pitocin she progressed to complete at 0645   She started pushing  She delivered a viable male  on 2022 at 0  Weight 7lbs 7 6oz via vaginal birth after  ()  Apgars were 9 (1 min) and 9 (5 min)  Delivery was complicated by a postpartum hemorrhage with an EBL of 1000 cc  She received TXA, Methergine, Cytotec and a Sandra device was placed  She also received 1 dose of Unasyn for infection prophylaxis   was transferred to  nursery  Patient tolerated the procedure well and was transferred to recovery in stable condition  Her post-partum course was complicated by removal of the [facility] device and a PP hgb of 9 4  Her post-partum pain was well controlled with oral analgesics  On day of discharge, she was ambulating and able to reasonably perform all ADLs   She was voiding and had appropriate bowel function  Pain was well controlled  She was discharged home on postpartum day #1 without complications  Patient was instructed to follow up with her OB as an outpatient and was given appropriate warnings to call doctor or provider if she develops signs of infection or uncontrolled pain  On day of discharge she was ambulating, voiding spontaneously, tolerating oral intake and hemodynamically stable  Mom's blood type is O negative and  Rhogam was not given  Disposition: Home    Planned Readmission: No    Discharge Medications:   Prenatal vitamin daily for 6 months or the duration of nursing, whichever is longer  Motrin 600 mg orally every 6 hours as needed for pain  Tylenol (over the counter) per bottle directions as needed for pain  Hydrocortisone cream 1% (over the counter) applied 1-2x daily to perineum as needed  Witch hazel pads for perineal discomfort as needed      Discharge instructions :   -Do not place anything (no partner, tampons or douche) in your vagina for 6 weeks  -You may walk for exercise for the first 6 weeks then gradually return to your usual activities    -Please do not drive for 1 week if you have no stitches and for 2 weeks if you have stitches or underwent a  delivery     -You may take baths or shower per your preference    -Please look at your bust (breasts) in the mirror daily and call your doctor for redness or tenderness or increased warmth  - If you have had a  section please look at your incision daily as well and call provider for increasing redness or steady drainage from the incision    -Please call your doctor's office if temperature > 100 4*F or 38* C, worsening pain or a foul discharge      Follow Up:  - Follow up in 3-6 weeks for postpartum visit

## 2022-04-01 NOTE — L&D DELIVERY NOTE
Vaginal Delivery Summary - OB/GYN   Ginger Hoyos 32 y o  female MRN: [id number]  Unit/Bed#: -01 Encounter: [id number]          Predelivery Diagnosis:  1  Pregnancy at 39 4 wks  2  Prior  section  3  Elective induction of labor    Postdelivery Diagnosis:  1  Same as above  2  Delivery of term   3  Postpartum hemorrhage    Procedure:  Vaginal delivery have treated  section    Attending: Jodi Huertas    Resident: None available    Anesthesia: Epidural    QBL: 331IB    Complications:  Lower uterine segment atony    Specimens: cord blood, arterial and venous cord blood gasses, placenta to storage, segment of cord    Findings:   1  Viable male at 0, with APGARS of 9 and 9 at 1 and 5 minutes respectively, Weight not available at time of dictation  2  Placenta -spontaneous at 0729  3  Intact perineum with bilateral periurethral abrasions  4  Arterial Blood gas 7 181, BE -8 4  5  Venous Blood gas 7 346  BE -5 3  6  Lower uterine segment atony - controlled with uterotonics and Sandra device    Disposition:  Patient tolerated the procedure well and was recovering in labor and delivery room     Brief history and labor course:  Ms Ginger Hoyos is a 32 y o  [id number] at 39 4wks presented to Labor and for induction of labor  She had 1 prior  section for a twin pregnancy  She received 2 Hernández balloons  She was then started on Pitocin  She was artificially ruptured for clear fluid at 5:10 p m  on 3/31  She then received an epidural   With Pitocin she progressed to complete at 0645   She started pushing  Description of procedure    After pushing for 42 minutes, at 0720 patient delivered a viable male , apgars of 9 (1 min) and 9 (5 min)  The fetal vertex delivered spontaneously  There was no evidence for nuchal cord  The anterior should delivered atraumatically with maternal expulsive forces and the assistance of downward traction   The posterior shoulder delivered with maternal expulsive forces and the assistance of upward traction  The remainder of the fetus delivered spontaneously  Upon delivery, the infant was placed on the mothers abdomen and the cord was clamped and cut  Awaiting nurse resuscitators evaluated the   Arterial and venous cord blood gases and cord blood was collected for analysis  These were promptly sent to the lab  In the immediate post-partum, 30 units of IV pitocin was administered  The placenta delivered spontaneously at 0729 and was noted to have an eccentrally inserted 3 vessel cord  The uterus was noted to have lower uterine segment atony  Metergine, TXA and cytotec were administered  The uterus was confirmed to be clear of clots and debris  The Sandra device was then placed, the cervical balloon inflated and attached to wall suction  At this point the bleeding was well controlled  A durand catheter was placed in the bladder  The vagina, cervix, perineum, and rectum were inspected and there was noted to be b/l periurethral abrasions that were not repaired  At the conclusion of the procedure, all needle, sponge, and instrument counts were noted to be correct  Patient tolerated the procedure well  I was present and participated in all key portions of the case

## 2022-04-01 NOTE — LACTATION NOTE
This note was copied from a baby's chart  CONSULT - LACTATION  Baby Boy Rodriguez (Emeley) 0 days male MRN: [id number]    [address] Room / Bed: (N)/(N) Encounter: [id number]    Maternal Information     MOTHER:  Jan Rodriguez  Maternal Age: 32 y o    OB History: # 1 - Date: 2019, Sex: None, Weight: None, GA: 5w0d, Delivery: None, Apgar1: None, Apgar5: None, Living: None, Birth Comments: None    # 2A - Date: 20, Sex: Male, Weight: 2660 g (5 lb 13 8 oz), GA: 36w3d, Delivery: , Low Transverse, Apgar1: 8, Apgar5: 9, Living: Living, Birth Comments: None  # 2B - Date: 20, Sex: Male, Weight: 2235 g (4 lb 14 8 oz), GA: 36w3d, Delivery: , Low Transverse, Apgar1: 8, Apgar5: 9, Living: Living, Birth Comments: None    # 3 - Date: 2021, Sex: None, Weight: None, GA: None, Delivery: None, Apgar1: None, Apgar5: None, Living: None, Birth Comments: None    # 4 - Date: 22, Sex: Male, Weight: 3390 g (7 lb 7 6 oz), GA: 39w4d, Delivery: Vaginal, Spontaneous, Apgar1: 9, Apgar5: 9, Living: Living, Birth Comments: None   Previouse breast reduction surgery? No    Lactation history:   Has patient previously breast fed: How long had patient previously breast fed:     Previous breast feeding complications:       Past Surgical History:   Procedure Laterality Date     SECTION      primary for twins, B was breech    OVARIAN CYST REMOVAL Left     dermoid cyst removed, pt still has ovary    [place] SURG RX MISSED ABORTN,1ST TRI N/A 2021    Procedure: DILATATION AND EVACUATION (D&E) (8 WEEKS);   Surgeon: Steve Cleveland MD;  [facility];  Service: Gynecology    TONSILLECTOMY  2015    WISDOM TOOTH EXTRACTION          Birth information:  YOB: 2022   Time of birth: 7:20 AM   Sex: male   Delivery type: Vaginal, Spontaneous   Birth Weight: 3390 g (7 lb 7 6 oz)   Percent of Weight Change: 0%     Gestational Age: 43w3d [unfilled]    Assessment     Breast and nipple assessment: wider space between breasts  Breasts are tubular in shape  Flat nipples  Right nipple has dimpled center  Left nipple inverts with nipple compression  [place] Assessment: receded chin    Feeding assessment: latch difficulty (due to nipples and recessed chin)  LATCH:  Latch: Repeated attempts, hold nipple in mouth, stimulate to suck   Audible Swallowing: A few with stimulation   Type of Nipple: Flat   Comfort (Breast/Nipple): Soft/non-tender   Hold (Positioning): Partial assist, teach one side, mother does other, staff holds   LATCH Score: 6          Feeding recommendations:  Mom attempted to latch twins 2 years ago and was not successful  Mom wants to exclusively breastfeed  Education on how to create milk supply  Demonstration and teach back of hand expression, s2s, and benefits of non-nutritive suck  Education on signs of satiation and timing of feeds  Encouraged mom to HE frequently  Assisted on creating a feeding plan  HE on right breast  Latch assist and manual pump used prior to positioning  Brought baby to the right breast with chin deep into breast tissue, in cross cradle hold  "U" shape hold to the breast  Deeper latch than previous attempts  Breast compressions demonstrated with teach back  Still shallow  Active, coordinated sucks until baby gets exhausted and falls off the breast  Brought baby to the left breast in cross cradle hold  Short sucking burst, then unlatched  Set up with pumping  Expressed 8 mls  Introduction to cup feeding  Baby took  mls then fell asleep  7 5 mls will be used for next feed between the breasts  Breast shells and supple cups were discussed to help with nipple everting at home  Feeding plan created    RSB/DC reviewed    Mom has S1 pump from twins that was never used  Pamphlet from [employer] to assist with choosing pump if mom desires       Set up appt at baby and [facility] for 22 @ 230 pm Feeding Plan    1  Meet early feeding cues  2  Use breast pump, manual pump, and latch assist to leilani nipple  3  Use massage, warmth, hand expression to stimulate breasts  4  Use pillows to bring baby to the breast  5  Bring baby to breast skin to skin  6  Tuck chin deeply into the breast to assist with deeper latch  4  Align nipple to nose, drag nipple to chin, (move baby not breast) and bring baby to breast when mouth is wide and deep latch is achieved  5  Use breast compressions to stimulate suck  6  Once baby does not suck with stimulation, becomes fussy, or un-latches feed expressed milk via alternative feeding method (Cup,syringe)  7  Bring baby back to breast for non-nutritive suck and skin to skin  8  Pump after each feed to stimulate breasts and have expressed milk for next feed    Instructions given on pumping  Discussed when to start, frequency, different pumps available versus manual expression  Discussed hygiene of hands and supplies as well as assembly, placement of flanges, size of flanged, preparing the breast and cycles and suction settings on pump  Demonstrated use of hand pump  Discussed labeling of milk, storage, and preparation of stored milk  Pumping:   - When pumping, begin in stimulation mode (high cycle, low vacuum) until milk begins to express  Change pump to expression mode (low cycle, high vacuum)  Use hands on pumping techniques to assist with milk transfer  When milk stops expressing, change back to stimulation mode  When milk begins to flow, change to expression mode  You make cycle pump up to three times in a pumping session  Information on hand expression given  Discussed benefits of knowing how to manually express breast including stimulating milk supply, softening nipple for latch and evacuating breast in the event of engorgement  Mom is encouraged to place baby skin to skin for feedings   Skin to skin education provided for baby placement on mother's chest, baby only in diaper, blankets below shoulders on baby's back  Skin to skin is encouraged to continue at home for feedings and between feedings  Worked on positioning infant up at chest level and starting to feed infant with nose arriving at the nipple  Then, using areolar compression to achieve a deep latch that is comfortable and exchanges optimum amounts of milk  - Start feedings on breast that last feeding ended   - allow no more than 3 hours between breast feeding sessions   - time between feedings is counted from the beginning of the first feed to the beginning of the next feeding session    Reviewed early signs of hunger, including tensing of hands and shoulders - no need to wait for open eyes  Crying is a late hunger sign  If baby is crying, soothe baby first and then attempt to latch  Reviewed normal sucking patterns: transition from stimulation to nutritive to release or non-nutritive  The goal is to see and hear lots of swallowing  Reviewed normal nursing pattern: infant could latch on one breast up to 30 minutes or until releases on own  Signs of satiation is open hand with fingers that do not grab your finger  Discussed difference in sensation of non-nutritive v nutritive sucking    Met with mother  Provided mother with Ready, Set, Baby booklet  Discussed Skin to Skin contact an benefits to mom and baby  Talked about the delay of the first bath until baby has adjusted  Spoke about the benefits of rooming in  Feeding on cue and what that means for recognizing infant's hunger  Avoidance of pacifiers for the first month discussed  Talked about exclusive breastfeeding for the first 6 months  Positioning and latch reviewed as well as showing images of other feeding positions  Discussed the properties of a good latch in any position  Reviewed hand/manual expression  Discussed s/s that baby is getting enough milk and some s/s that breastfeeding dyad may need further help      Gave information on common concerns, what to expect the first few weeks after delivery, preparing for other caregivers, and how partners can help  Resources for support also provided  Encouraged parents to call for assistance, questions, and concerns about breastfeeding  Extension provided  Met with mother to go over discharge breastfeeding booklet including the feeding log  Emphasized 8 or more (12) feedings in a 24 hour period, what to expect for the number of diapers per day of life and the progression of properties of the  stooling pattern  Reviewed breastfeeding and your lifestyle, storage and preparation of breast milk, how to keep you breast pump clean, the employed breastfeeding mother and paced bottle feeding handouts  Booklet included Breastfeeding Resources for after discharge including access to the number for the [id number] [place], [place] 2022 3:19 PM

## 2022-04-01 NOTE — PLAN OF CARE
Problem: Knowledge Deficit  Goal: Verbalizes understanding of labor plan  Description: Assess patient/family/caregiver's baseline knowledge level and ability to understand information  Provide education via patient/family/caregiver's preferred learning method at appropriate level of understanding  1  Provide teaching at level of understanding  2  Provide teaching via preferred learning method(s)  Outcome: Progressing  Goal: Patient/family/caregiver demonstrates understanding of disease process, treatment plan, medications, and discharge instructions  Description: Complete learning assessment and assess knowledge base  Interventions:  - Provide teaching at level of understanding  - Provide teaching via preferred learning methods  Outcome: Progressing     Problem: Labor & Delivery  Goal: Manages discomfort  Description: Assess and monitor for signs and symptoms of discomfort  Assess patient's pain level regularly and per hospital policy  Administer medications as ordered  Support use of nonpharmacological methods to help control pain such as distraction, imagery, relaxation, and application of heat and cold  Collaborate with interdisciplinary team and patient to determine appropriate pain management plan  1  Include patient in decisions related to comfort  2  Offer non-pharmacological pain management interventions  3  Report ineffective pain management to physician  Outcome: Progressing  Goal: Patient vital signs are stable  Description: 1  Assess vital signs - vaginal delivery    Outcome: Progressing     Problem: BIRTH - VAGINAL/ SECTION  Goal: Fetal and maternal status remain reassuring during the birth process  Description: INTERVENTIONS:  - Monitor vital signs  - Monitor fetal heart rate  - Monitor uterine activity  - Monitor labor progression (vaginal delivery)  - DVT prophylaxis  - Antibiotic prophylaxis  Outcome: Progressing  Goal: Emotionally satisfying birthing experience for mother/fetus  Description: Interventions:  - Assess, plan, implement and evaluate the nursing care given to the patient in labor  - Advocate the philosophy that each childbirth experience is a unique experience and support the family's chosen level of involvement and control during the labor process   - Actively participate in both the patient's and family's teaching of the birth process  - Consider cultural, Restoration and age-specific factors and plan care for the patient in labor  Outcome: Progressing     Problem: PAIN - ADULT  Goal: Verbalizes/displays adequate comfort level or baseline comfort level  Description: Interventions:  - Encourage patient to monitor pain and request assistance  - Assess pain using appropriate pain scale  - Administer analgesics based on type and severity of pain and evaluate response  - Implement non-pharmacological measures as appropriate and evaluate response  - Consider cultural and social influences on pain and pain management  - Notify physician/advanced practitioner if interventions unsuccessful or patient reports new pain  Outcome: Progressing     Problem: INFECTION - ADULT  Goal: Absence or prevention of progression during hospitalization  Description: INTERVENTIONS:  - Assess and monitor for signs and symptoms of infection  - Monitor lab/diagnostic results  - Monitor all insertion sites, i e  indwelling lines, tubes, and drains  - Monitor endotracheal if appropriate and nasal secretions for changes in amount and color  - [place] appropriate cooling/warming therapies per order  - Administer medications as ordered  - Instruct and encourage patient and family to use good hand hygiene technique  - Identify and instruct in appropriate isolation precautions for identified infection/condition  Outcome: Progressing  Goal: Absence of fever/infection during neutropenic period  Description: INTERVENTIONS:  - Monitor WBC    Outcome: Progressing     Problem: SAFETY ADULT  Goal: Patient will remain free of falls  Description: INTERVENTIONS:  - Educate patient/family on patient safety including physical limitations  - Instruct patient to call for assistance with activity   - Consult OT/PT to assist with strengthening/mobility   - Keep Call bell within reach  - Keep bed low and locked with side rails adjusted as appropriate  - Keep care items and personal belongings within reach  - Initiate and maintain comfort rounds  - Make Fall Risk Sign visible to staff  - Offer Toileting every Hours, in advance of need  - Initiate/Maintain alarm  - Obtain necessary fall risk management equipment: - Apply yellow socks and bracelet for high fall risk patients  - Consider moving patient to room near nurses station  Outcome: Progressing  Goal: Maintain or return to baseline ADL function  Description: INTERVENTIONS:  -  Assess patient's ability to carry out ADLs; assess patient's baseline for ADL function and identify physical deficits which impact ability to perform ADLs (bathing, care of mouth/teeth, toileting, grooming, dressing, etc )  - Assess/evaluate cause of self-care deficits   - Assess range of motion  - Assess patient's mobility; develop plan if impaired  - Assess patient's need for assistive devices and provide as appropriate  - Encourage maximum independence but intervene and supervise when necessary  - Involve family in performance of ADLs  - Assess for home care needs following discharge   - Consider OT consult to assist with ADL evaluation and planning for discharge  - Provide patient education as appropriate  Outcome: Progressing  Goal: Maintains/Returns to pre admission functional level  Description: INTERVENTIONS:  - Perform BMAT or MOVE assessment daily    - Set and communicate daily mobility goal to care team and patient/family/caregiver     - Collaborate with rehabilitation services on mobility goals if consulted  - - Out of bed for toileting  - Record patient progress and toleration of activity level   Outcome: Progressing     Problem: DISCHARGE PLANNING  Goal: Discharge to home or other facility with appropriate resources  Description: INTERVENTIONS:  - Identify barriers to discharge w/patient and caregiver  - Arrange for needed discharge resources and transportation as appropriate  - Identify discharge learning needs (meds, wound care, etc )  - Arrange for interpretive services to assist at discharge as needed  - Refer to Case Management Department for coordinating discharge planning if the patient needs post-hospital services based on physician/advanced practitioner order or complex needs related to functional status, cognitive ability, or social support system  Outcome: Progressing

## 2022-04-02 VITALS
HEART RATE: 78 BPM | BODY MASS INDEX: 36.96 KG/M2 | OXYGEN SATURATION: 97 % | WEIGHT: 230 LBS | RESPIRATION RATE: 18 BRPM | SYSTOLIC BLOOD PRESSURE: 114 MMHG | DIASTOLIC BLOOD PRESSURE: 59 MMHG | TEMPERATURE: 98.1 F | HEIGHT: 66 IN

## 2022-04-02 LAB
ERYTHROCYTE [DISTWIDTH] IN BLOOD BY AUTOMATED COUNT: 13.7 % (ref 11.6–15.1)
HBV SURFACE AG SER QL: NORMAL
HBV SURFACE AG SER QL: NORMAL
HCT VFR BLD AUTO: 26.7 % (ref 34.8–46.1)
HGB BLD-MCNC: 9.4 G/DL (ref 11.5–15.4)
MCH RBC QN AUTO: 30 PG (ref 26.8–34.3)
MCHC RBC AUTO-ENTMCNC: 35.2 G/DL (ref 31.4–37.4)
MCV RBC AUTO: 85 FL (ref 82–98)
PLATELET # BLD AUTO: 174 THOUSANDS/UL (ref 149–390)
PMV BLD AUTO: 10.4 FL (ref 8.9–12.7)
RBC # BLD AUTO: 3.13 MILLION/UL (ref 3.81–5.12)
WBC # BLD AUTO: 11.12 THOUSAND/UL (ref 4.31–10.16)

## 2022-04-02 PROCEDURE — 85027 COMPLETE CBC AUTOMATED: CPT | Performed by: OBSTETRICS & GYNECOLOGY

## 2022-04-02 PROCEDURE — 87340 HEPATITIS B SURFACE AG IA: CPT | Performed by: OBSTETRICS & GYNECOLOGY

## 2022-04-02 PROCEDURE — 99024 POSTOP FOLLOW-UP VISIT: CPT | Performed by: OBSTETRICS & GYNECOLOGY

## 2022-04-02 RX ORDER — ACETAMINOPHEN 325 MG/1
650 TABLET ORAL EVERY 4 HOURS PRN
Refills: 0
Start: 2022-04-02 | End: 2022-05-19

## 2022-04-02 RX ORDER — IBUPROFEN 600 MG/1
600 TABLET ORAL EVERY 6 HOURS
Qty: 30 TABLET | Refills: 0
Start: 2022-04-02 | End: 2022-05-19

## 2022-04-02 RX ADMIN — IBUPROFEN 600 MG: 600 TABLET ORAL at 11:04

## 2022-04-02 RX ADMIN — IRON SUCROSE 200 MG: 20 INJECTION, SOLUTION INTRAVENOUS at 11:36

## 2022-04-02 RX ADMIN — IBUPROFEN 600 MG: 600 TABLET ORAL at 04:29

## 2022-04-02 RX ADMIN — DOCUSATE SODIUM 100 MG: 100 CAPSULE, LIQUID FILLED ORAL at 18:04

## 2022-04-02 RX ADMIN — DOCUSATE SODIUM 100 MG: 100 CAPSULE, LIQUID FILLED ORAL at 08:46

## 2022-04-02 RX ADMIN — IBUPROFEN 600 MG: 600 TABLET ORAL at 18:04

## 2022-04-02 NOTE — PROGRESS NOTES
Progress Note - OB/GYN   Livia Tellez 32 y o  female MRN: [id number]  Unit/Bed#: -01 Encounter: [id number]    Assessment:  PPD#1 s/p Spontaneous Vaginal Delivery (successful )    Plan:    1) Postpartum care  - Encourage ambulation  - Encourage breastfeeding  - Continue current meds     2) PPH  - EBL 900cc  - status post TXA, Methergine, Cytotec, Sandra device  - AM CBC of 9 4, consider Venofer   - status post 1 dose of Unasyn for Sandra device insertion    3) Rh Negative  - baby also Rh negative, RhoGAM not indicated    4) Disposition  - she is interested in early discharge home today, will leave final disposition up to attending physician given postpartum hemorrhage    Subjective/Objective     Subjective:     Pain: no  Tolerating PO: yes  Voiding: yes  Flatus: yes  BM: yes  Ambulating: yes  Breastfeeding: Breastfeeding  Chest pain: no  Shortness of breath: no  Leg pain: no  Lochia: Scant    Objective:     Vitals:  Vitals:    22 1555 22 1858 22 1956 22 0000   BP: 120/65 116/71 114/56 109/61   BP Location:  Right arm Right arm    Pulse: 90 80 77 80   Resp: 16 20 19 18   Temp: 99 2 °F (37 3 °C) 97 5 °F (36 4 °C) 97 5 °F (36 4 °C) 97 5 °F (36 4 °C)   TempSrc: Oral Oral Oral Oral   SpO2:  97%     Weight:       Height:           Physical Exam:   GEN: appears well, alert and oriented x 3, pleasant and cooperative   CV: Regular rate  RESP: non labored breathing  ABDOMEN: soft, no tenderness, no distention, Uterine fundus firm and non-tender, at the umbilicus   EXTREMITIES: non-tender  NEURO Alert and oriented to person, place, and time         Lab Results   Component Value Date    WBC 18 10 (H) 2022    HGB 11 2 (L) 2022    HCT 33 4 (L) 2022    MCV 89 2022     2022         Bailey Mancia DO, PGY-2  Obstetrics & Gynecology  22

## 2022-04-02 NOTE — PLAN OF CARE
Problem: PAIN - ADULT  Goal: Verbalizes/displays adequate comfort level or baseline comfort level  Description: Interventions:  - Encourage patient to monitor pain and request assistance  - Assess pain using appropriate pain scale  - Administer analgesics based on type and severity of pain and evaluate response  - Implement non-pharmacological measures as appropriate and evaluate response  - Consider cultural and social influences on pain and pain management  - Notify physician/advanced practitioner if interventions unsuccessful or patient reports new pain  Outcome: Progressing     Problem: INFECTION - ADULT  Goal: Absence or prevention of progression during hospitalization  Description: INTERVENTIONS:  - Assess and monitor for signs and symptoms of infection  - Monitor lab/diagnostic results  - Monitor all insertion sites, i e  indwelling lines, tubes, and drains  - Monitor endotracheal if appropriate and nasal secretions for changes in amount and color  - [place] appropriate cooling/warming therapies per order  - Administer medications as ordered  - Instruct and encourage patient and family to use good hand hygiene technique  - Identify and instruct in appropriate isolation precautions for identified infection/condition  Outcome: Progressing  Goal: Absence of fever/infection during neutropenic period  Description: INTERVENTIONS:  - Monitor WBC    Outcome: Progressing     Problem: SAFETY ADULT  Goal: Patient will remain free of falls  Description: INTERVENTIONS:  - Educate patient/family on patient safety including physical limitations  - Instruct patient to call for assistance with activity   - Consult OT/PT to assist with strengthening/mobility   - Keep Call bell within reach  - Keep bed low and locked with side rails adjusted as appropriate  - Keep care items and personal belongings within reach  - Initiate and maintain comfort rounds  - Make Fall Risk Sign visible to staff  - Apply yellow socks and bracelet for high fall risk patients  - Consider moving patient to room near nurses station  Outcome: Progressing  Goal: Maintain or return to baseline ADL function  Description: INTERVENTIONS:  -  Assess patient's ability to carry out ADLs; assess patient's baseline for ADL function and identify physical deficits which impact ability to perform ADLs (bathing, care of mouth/teeth, toileting, grooming, dressing, etc )  - Assess/evaluate cause of self-care deficits   - Assess range of motion  - Assess patient's mobility; develop plan if impaired  - Assess patient's need for assistive devices and provide as appropriate  - Encourage maximum independence but intervene and supervise when necessary  - Involve family in performance of ADLs  - Assess for home care needs following discharge   - Consider OT consult to assist with ADL evaluation and planning for discharge  - Provide patient education as appropriate  Outcome: Progressing  Goal: Maintains/Returns to pre admission functional level  Description: INTERVENTIONS:  - Perform BMAT or MOVE assessment daily    - Set and communicate daily mobility goal to care team and patient/family/caregiver     - Collaborate with rehabilitation services on mobility goals if consulted  - Out of bed for toileting  - Record patient progress and toleration of activity level   Outcome: Progressing     Problem: DISCHARGE PLANNING  Goal: Discharge to home or other facility with appropriate resources  Description: INTERVENTIONS:  - Identify barriers to discharge w/patient and caregiver  - Arrange for needed discharge resources and transportation as appropriate  - Identify discharge learning needs (meds, wound care, etc )  - Arrange for interpretive services to assist at discharge as needed  - Refer to Case Management Department for coordinating discharge planning if the patient needs post-hospital services based on physician/advanced practitioner order or complex needs related to functional status, cognitive ability, or social support system  Outcome: Progressing     Problem: POSTPARTUM  Goal: Experiences normal postpartum course  Description: INTERVENTIONS:  - Monitor maternal vital signs  - Assess uterine involution and lochia  Outcome: Progressing  Goal: Appropriate maternal -  bonding  Description: INTERVENTIONS:  - Identify family support  - Assess for appropriate maternal/infant bonding   -Encourage maternal/infant bonding opportunities  - Referral to  or  as needed  Outcome: Progressing  Goal: Establishment of infant feeding pattern  Description: INTERVENTIONS:  - Assess breast/bottle feeding  - Refer to lactation as needed  Outcome: Progressing  Goal: Incision(s), wounds(s) or drain site(s) healing without S/S of infection  Description: INTERVENTIONS  - Assess and document dressing, incision, wound bed, drain sites and surrounding tissue  - Provide patient and family education  Outcome: Progressing

## 2022-04-05 NOTE — UTILIZATION REVIEW
Inpatient Admission Authorization Request   Notification of Maternity/Delivery &  Birth Information for Admission   SERVICING FACILITY:   [address]  Tax ID: [id number]  NPI: [id number]  Place of Service: Inpatient [address] [facility]  [address]  Place of Service Code: 24     ATTENDING PROVIDER:  Attending Name and NPI#: Inocente Tilley, Lizzie Donahue [[id number]]  Address: Tank Mackenzie  [address]  Phone: [phone]     UTILIZATION REVIEW CONTACT:  [facility] Utilization Review Supervisor  Network Utilization Review Department  Phone: [phone]  Fax [phone]  Email: [email or web address]     PHYSICIAN ADVISORY SERVICES:  FOR CYGM-CZ-SBYZ REVIEW - MEDICAL NECESSITY DENIAL  Phone: [phone]  Fax: [phone]  Email: [email or web address]     TYPE OF REQUEST:  Inpatient Status     ADMISSION INFORMATION:  ADMISSION DATE/TIME: 3/30/22  8:17 PM  PATIENT DIAGNOSIS CODE/DESCRIPTION:  Encounter for planned induction of labor [Z34 90]  44 weeks gestation of pregnancy [Z3A 39]  Encounter for full-term uncomplicated delivery [Q15] The primary encounter diagnosis was , delivered  A diagnosis of 39 weeks gestation of pregnancy was also pertinent to this visit  1  , delivered    2  39 weeks gestation of pregnancy      DISCHARGE DATE/TIME: 2022  7:00 PM   MOTHER AND  INFORMATION:  Mother: Lesia Montiel 1995   Delivering clinician: Inocente iTlley   OB History        4    Para   2    Term   1       1    AB   2    Living   3       SAB   1    IAB   0    Ectopic   0    Multiple   1    Live Births   3               Alfred Name & MRN:   Information for the patient's :  Jose Daniel Hughes [[id number]]     Alfred Delivery Information:  Sex: male  Delivered 2022 7:20 AM by Vaginal, Spontaneous;  Gestational Age: 43w3d    Alfred Measurements:  Weight: 7 lb 7 6 oz (3390 g); Height: 20 5"    APGAR 1 minute 5 minutes 10 minutes   Totals: 9 9       Birth Information: 32 y o  female MRN: [id number] Unit/Bed#: -01 Estimated Date of Delivery: 22  Birthweight: No birth weight on file  Gestational Age: <None> Delivery Type: Vaginal, Spontaneous    IMPORTANT INFORMATION:  Please contact the Manuela Carrasco directly with any questions or concerns regarding this request  Department voicemails are confidential     Send requests for admission clinical reviews, concurrent reviews, approvals, and administrative denials due to lack of clinical to fax [phone]

## 2022-04-07 LAB — PLACENTA IN STORAGE: NORMAL

## 2022-04-08 ENCOUNTER — OFFICE VISIT (OUTPATIENT)
Dept: POSTPARTUM | Facility: CLINIC | Age: 27
End: 2022-04-08
Payer: COMMERCIAL

## 2022-04-08 VITALS — DIASTOLIC BLOOD PRESSURE: 70 MMHG | HEART RATE: 72 BPM | SYSTOLIC BLOOD PRESSURE: 128 MMHG

## 2022-04-08 DIAGNOSIS — Z71.89 ENCOUNTER FOR BREAST FEEDING COUNSELING: Primary | ICD-10-CM

## 2022-04-08 PROCEDURE — 99404 PREV MED CNSL INDIV APPRX 60: CPT | Performed by: PEDIATRICS

## 2022-04-08 NOTE — PROGRESS NOTES
INITIAL BREAST FEEDING EVALUATION    Informant/Relationship: Emeley/Self    Discussion of General Lactation Issues: Wicho did not latch well initially, Christen Neal has choosen to exclusively pump and bottle feed  Jan pumped colostrum  in the hospital at 36hrs was no longer able to express any milk and once home did not start to produce until day 4  She has been able to provide exclusive breast milk for him since day 4  Tejal had a post partum hemorrhage  Christen Neal is here today for basic pumping education  Infant is 4 week old today   History:  Fertility Problem:yes - Anovulation, IVF  Breast changes:no  : yes - Elective IOL  Full term:yes - 39 4   labor:no  First nursing/attempt < 1 hour after birth:yes - did not latch  Skin to skin following delivery:yes - about 30 min  Breast changes after delivery:yes - engorgment when milk came in on day 3  Rooming in (infant in room with mother with exception of procedures, eg  Circumcision: yes - yes  Blood sugar issues:no  NICU stay:no  Jaundice:no  Phototherapy:no  Supplement given: (list supplement and method used as well as reason(s):yes - formula for 2 day one home  Similac      Past Medical History:   Diagnosis Date    Disease of thyroid gland     postpartum thyroiditis    Female infertility     Herpes     2019    PONV (postoperative nausea and vomiting)     Varicella     had chickenpox as a child         Current Outpatient Medications:     cholecalciferol (VITAMIN D3) 1,000 units tablet, Take 1,000 Units by mouth daily, Disp: , Rfl:     Prenatal Vit-Fe Fumarate-FA (BL PRENATAL VITAMINS PO), Take by mouth, Disp: , Rfl:     acetaminophen (TYLENOL) 325 mg tablet, Take 2 tablets (650 mg total) by mouth every 4 (four) hours as needed for mild pain (Patient not taking: Reported on 2022 ), Disp: , Rfl: 0    ibuprofen (MOTRIN) 600 mg tablet, Take 1 tablet (600 mg total) by mouth every 6 (six) hours (Patient not taking: Reported on 4/8/2022 ), Disp: 30 tablet, Rfl: 0    valACYclovir (VALTREX) 500 mg tablet, Take 1 tablet (500 mg total) by mouth 2 (two) times a day (Patient not taking: Reported on 4/8/2022 ), Disp: 60 tablet, Rfl: 1    No Known Allergies    Social History     Substance and Sexual Activity   Drug Use No       Social History     Interval Breastfeeding History: Alternative/Artificial Feedings:   Bottle: Yes, paced bottle feeding with a slow flow nipple rocael thai            Breast Milk:                     Amount: 2 -2 5            Frequency Q 3-3 5 hrsHr between feedings  Elimination Problems: No      Equipment:    Pump            Type: Spectra S1            Frequency of Use:  q3hrs    Equipment Problems: no    Mom:  Breast: Normal, medium sized full glands  Nipple Assessment in General: Normal: elongated/eraser, no discoloration and no damage noted  Mother's Awareness of Feeding Cues                 Recognizes: Yes                  Verbalizes: Yes  Support System: FOB extended family  History of Breastfeeding: pumped in the hospital  With her twins the switched to formula  Changes/Stressors/Violence: 3 small children under age 3   Concerns/Goals: To pump and provide breast milk as long as possible    Problems with Mom: had a post partum hemorrhage     Physical Exam  Constitutional:       Appearance: Normal appearance  HENT:      Head: Normocephalic  Cardiovascular:      Rate and Rhythm: Normal rate and regular rhythm  Pulses: Normal pulses  Heart sounds: Normal heart sounds  Pulmonary:      Effort: Pulmonary effort is normal       Breath sounds: Normal breath sounds  Musculoskeletal:         General: Normal range of motion  Cervical back: Normal range of motion  Neurological:      General: No focal deficit present  Mental Status: She is alert and oriented to person, place, and time  Skin:     General: Skin is warm and dry     Psychiatric:         Mood and Affect: Mood normal  Behavior: Behavior normal          Thought Content: Thought content normal          Judgment: Judgment normal              Handouts:   Storing human milk, Paced bottle feeding, Hands on pumping and Hand expression    Education:    Reviewed Frequency/Supply & Demand: Continue to pump 8-12 times a day including over night  to meet Hutchin's feeding needs  May add an extra session to express milk to store if desired  Reviewed Infant:Cues and varied States of Awareness yes  Reviewed Alternative/Artificial Feedings: paced bottle feeding with a slow flow nipple  Reviewed Mom/Breast care: hands on pumping, hand expression  Reviewed Equipment: how to size flanges, how to cycle the pump    Plan:  Continue to pump q3 hours start using hands on pumping and hand expression to yield more milk  Add an additional session if you desire to store milk  Continue paced bottle feeding  I have spent 90 minutes with Patient  today in which greater than 50% of this time was spent in counseling/coordination of care regarding Patient and family education

## 2022-04-08 NOTE — PATIENT INSTRUCTIONS
Continue to pump every 3 hours or 8-12 times in 24 hours, including at least 1-2 times over night  Refer to the handout on hands on pumping and try that technique, add in some hand expression to yield more milk  If you desire to store milk for future use consider adding a pumping session in the morning and storing any surplus milk from the day  When cycling the pump start in let down mode, once milk begins to flow switch to expression mode, if flow slows you may go back to letdown mode and repeat this cycle up to 3 times in a pumping session, pumping no more than 20 minutes at a time  Check flange sizing as we discussed and consider adjusting size as needed  When feeding Wicho do so by paced bottle feeding and you may do so skin to skin  Spends lots of time skin to skin

## 2022-04-11 NOTE — PROGRESS NOTES
I have reviewed the notes, assessments, and/or procedures performed by Cassi Andres RN, IBCLC, I concur with her/his documentation of Matthew Hernández MD 04/10/22

## 2022-04-28 ENCOUNTER — TELEPHONE (OUTPATIENT)
Dept: POSTPARTUM | Facility: CLINIC | Age: 27
End: 2022-04-28

## 2022-04-28 NOTE — TELEPHONE ENCOUNTER
Laure Godfrey has been pumping exclusively for [place] (3wks) - she has decided to switch to formula & discontinue pumping - she would like to discuss the proper way to wean from pumping so no issues arise

## 2022-04-28 NOTE — TELEPHONE ENCOUNTER
Nubia Mcclelland is pumping for her 1week old, she has decided to stop pumping and exclusively formula feed, she is comfortable;e with the formula feeding baby is doing well with it, her questions are about weaning herself from pumping  We discussed gradually decreasing pumping to avoid complications from abrupt weaning  We discussed wearing a supportive bra, taking over the counter pain medications such as tylenol and ibuprofen, applying cold compresses for comfort and expressing just enough to be comfortable rather than pumping to empty which will signal more milk to be made  Call for signs of mastitis, lumps, fever, chills or flu like symptoms or with any questions or concerns  Nubia Mcclelland has no further questions at this time

## 2022-05-05 ENCOUNTER — TELEPHONE (OUTPATIENT)
Dept: OBGYN CLINIC | Facility: CLINIC | Age: 27
End: 2022-05-05

## 2022-05-05 NOTE — TELEPHONE ENCOUNTER
How are you feeling? Feeling Good  Are you having any vaginal bleeding? Completley stopped bleeding  Have you had any problems voiding? No  Have you had any problems moving your bowels? Was constipated, started colace and is now having regular BM's   Type of Delivery?   Vaginal delivery - any issues with healing? No Healing well      Baby's name: Bobby  How is the baby doing? Doing well  Are you breast/bottle feeding? Now bottle feeding, stopped pumping a couple days ago  How is that going? Good  Have you seen lactation consultant? Yes saw one week after delivery, switched to pumping due to latching issues  Are you having any baby blues? No  Do you have any help at home?  home for now    Follow up appointment scheduled :     Patient verbalized understanding

## 2022-05-19 ENCOUNTER — POSTPARTUM VISIT (OUTPATIENT)
Dept: OBGYN CLINIC | Facility: CLINIC | Age: 27
End: 2022-05-19

## 2022-05-19 VITALS
HEIGHT: 66 IN | WEIGHT: 210 LBS | BODY MASS INDEX: 33.75 KG/M2 | DIASTOLIC BLOOD PRESSURE: 68 MMHG | SYSTOLIC BLOOD PRESSURE: 124 MMHG

## 2022-05-19 PROBLEM — O98.512 COVID-19 AFFECTING PREGNANCY IN SECOND TRIMESTER: Status: RESOLVED | Noted: 2022-01-11 | Resolved: 2022-05-19

## 2022-05-19 PROBLEM — O09.813 PREGNANCY RESULTING FROM IN VITRO FERTILIZATION IN THIRD TRIMESTER: Status: RESOLVED | Noted: 2019-01-03 | Resolved: 2022-05-19

## 2022-05-19 PROBLEM — U07.1 COVID-19 AFFECTING PREGNANCY IN SECOND TRIMESTER: Status: RESOLVED | Noted: 2022-01-11 | Resolved: 2022-05-19

## 2022-05-19 PROBLEM — O34.219 PREGNANCY WITH HISTORY OF CESAREAN SECTION, ANTEPARTUM: Status: RESOLVED | Noted: 2020-06-17 | Resolved: 2022-05-19

## 2022-05-19 PROBLEM — O34.219 DESIRES VBAC (VAGINAL BIRTH AFTER CESAREAN) TRIAL: Status: RESOLVED | Noted: 2022-01-27 | Resolved: 2022-05-19

## 2022-05-19 PROCEDURE — 99024 POSTOP FOLLOW-UP VISIT: CPT | Performed by: OBSTETRICS & GYNECOLOGY

## 2022-05-19 NOTE — PROGRESS NOTES
Assessment/Plan     Normal postpartum exam      1  Contraception: condoms  2  Annual exam due in August    3  Lactation consult, [address] information discussed  4  Increase activity as tolerated, may resume all normal activity at 6 weeks postpartum  5  Anticipated return to work: 6 - 12 weeks post partum  Devon Mayen is a 32 y o  female who presents for a postpartum visit  She is 6 weeks postpartum following a vaginal birth after  ()  I have fully reviewed the prenatal and intrapartum course  The delivery was at 39 4 gestational weeks  Anesthesia: epidural  Laceration: periurethral      Bleeding no bleeding  Bowel function is normal  Bladder function is normal  Patient has not been sexually active  Desired contraception method is condoms  Postpartum depression screening: negative  EPDS : 0    Baby's course has been uneventful  Baby is feeding by bottle - formula  Last Pap :  ; no abnormalities  Gestational Diabetes: no  Pregnancy Complications: postpartum hemorrhage    The following portions of the patient's history were reviewed and updated as appropriate: allergies, current medications, past family history, past medical history, past social history, past surgical history and problem list       Current Outpatient Medications:     cholecalciferol (VITAMIN D3) 1,000 units tablet, Take 1,000 Units by mouth daily, Disp: , Rfl:     Prenatal Vit-Fe Fumarate-FA (BL PRENATAL VITAMINS PO), Take by mouth, Disp: , Rfl:     No Known Allergies    Review of Systems  Constitutional: no fever, feels well  Breasts: no complaints of breast pain, breast lump, or nipple discharge  Gastrointestinal: no complaints nausea, vomiting  Genitourinary: as noted in HPI    Neurological: no complaints of headache      Objective      /68 (BP Location: Right arm, Patient Position: Sitting, Cuff Size: Standard)   Ht 5' 6" (1 676 m)   Wt 95 3 kg (210 lb)   LMP  (LMP Unknown) BMI 33 89 kg/m²     OBGyn Exam  General: alert and oriented, in no acute distress

## 2022-05-28 LAB
BASOPHILS # BLD AUTO: 0 X10E3/UL (ref 0–0.2)
BASOPHILS NFR BLD AUTO: 1 %
EOSINOPHIL # BLD AUTO: 0.1 X10E3/UL (ref 0–0.4)
EOSINOPHIL NFR BLD AUTO: 2 %
ERYTHROCYTE [DISTWIDTH] IN BLOOD BY AUTOMATED COUNT: 13.5 % (ref 11.7–15.4)
HCT VFR BLD AUTO: 40.3 % (ref 34–46.6)
HGB BLD-MCNC: 13.3 G/DL (ref 11.1–15.9)
IMM GRANULOCYTES # BLD: 0 X10E3/UL (ref 0–0.1)
IMM GRANULOCYTES NFR BLD: 0 %
LYMPHOCYTES # BLD AUTO: 2.4 X10E3/UL (ref 0.7–3.1)
LYMPHOCYTES NFR BLD AUTO: 42 %
MCH RBC QN AUTO: 27.5 PG (ref 26.6–33)
MCHC RBC AUTO-ENTMCNC: 33 G/DL (ref 31.5–35.7)
MCV RBC AUTO: 83 FL (ref 79–97)
MONOCYTES # BLD AUTO: 0.4 X10E3/UL (ref 0.1–0.9)
MONOCYTES NFR BLD AUTO: 6 %
NEUTROPHILS # BLD AUTO: 2.9 X10E3/UL (ref 1.4–7)
NEUTROPHILS NFR BLD AUTO: 49 %
PLATELET # BLD AUTO: 265 X10E3/UL (ref 150–450)
RBC # BLD AUTO: 4.83 X10E6/UL (ref 3.77–5.28)
TSH SERPL DL<=0.005 MIU/L-ACNC: 0.93 UIU/ML (ref 0.45–4.5)
WBC # BLD AUTO: 5.8 X10E3/UL (ref 3.4–10.8)

## 2022-06-07 ENCOUNTER — OFFICE VISIT (OUTPATIENT)
Dept: DERMATOLOGY | Age: 27
End: 2022-06-07
Payer: COMMERCIAL

## 2022-06-07 VITALS — WEIGHT: 210 LBS | TEMPERATURE: 97.3 F | HEIGHT: 66 IN | BODY MASS INDEX: 33.75 KG/M2

## 2022-06-07 DIAGNOSIS — D22.70 MULTIPLE BENIGN MELANOCYTIC NEVI OF UPPER AND LOWER EXTREMITIES AND TRUNK: Primary | ICD-10-CM

## 2022-06-07 DIAGNOSIS — D22.9 ATYPICAL MOLE: ICD-10-CM

## 2022-06-07 DIAGNOSIS — D22.60 MULTIPLE BENIGN MELANOCYTIC NEVI OF UPPER AND LOWER EXTREMITIES AND TRUNK: Primary | ICD-10-CM

## 2022-06-07 DIAGNOSIS — D22.5 MULTIPLE BENIGN MELANOCYTIC NEVI OF UPPER AND LOWER EXTREMITIES AND TRUNK: Primary | ICD-10-CM

## 2022-06-07 PROCEDURE — 99202 OFFICE O/P NEW SF 15 MIN: CPT | Performed by: DERMATOLOGY

## 2022-06-07 NOTE — PATIENT INSTRUCTIONS
MELANOCYTIC NEVI ("Moles")    Physical Exam:  Anatomic Location Affected:   Mostly on sun-exposed areas of the trunk and extremities  Morphological Description:  Scattered, 1-4mm round to ovoid, symmetrical-appearing, even bordered, skin colored to dark brown macules/papules, mostly in sun-exposed areas  Pertinent Positives:  Pertinent Negatives: Additional History of Present Condition:  Present for years    Assessment and Plan:  Based on a thorough discussion of this condition and the management approach to it (including a comprehensive discussion of the known risks, side effects and potential benefits of treatment), the patient (family) agrees to implement the following specific plan:  Reassured benign     Melanocytic Nevi  Melanocytic nevi ("moles") are caused by collections of the color producing skin cells, or melanocytes, in 1 area in the skin  They can range in color from pink to dark brown and be either raised or flat  Some moles are present at birth (I e , "congenital nevi"), while others come up later in life (i e , "acquired nevi")  [place] exposure also stimulates the body to make more moles, ie the more sun you get the more moles you'll grow  Clinically distinguishing a healthy mole from melanoma may be difficult  The "ABCDE's" of moles have been suggested as a means of helping to alert a person to a suspicious mole and the possible increased risk of melanoma  Asymmetry: Healthy moles tend to be symmetric, while melanomas are often asymmetric  Asymmetry means if you draw a line through the mole, the two halves do not match in color, size, shape, or surface texture Any mole that starts to demonstrate "asymmetry" should be examined promptly by a board certified dermatologist      Border: Healthy moles tend to have discrete, even borders  The border of a melanoma often blends into the normal skin and does not sharply delineate the mole from normal skin    Any mole that starts to demonstrate "uneven borders" should be examined promptly     Color: Healthy moles tend to be one color throughout  Melanomas tend to be made up of different colors ranging from dark black, blue, white, or red  Any mole that demonstrates a color change should be examined promptly    Diameter: Healthy moles tend to be smaller than 0 6 cm in size; an exception are "congenital nevi" that can be larger  Melanomas tend to grow and can often be greater than 0 6 cm (1/4 of an inch, or the size of a pencil eraser)  This is only a guideline, and many normal moles may be larger than 0 6 cm without being unhealthy  Any mole that starts to change in size (small to bigger or bigger to smaller) should be examined promptly    Evolving: Healthy moles tend to "stay the same "  Melanomas may often show signs of change or evolution such as a change in size, shape, color, or elevation  Any mole that starts to itch, bleed, crust, burn, hurt, or ulcerate or demonstrate a change or evolution should be examined promptly by a board certified dermatologist       What are atypical moles or dysplastic nevi? Dysplastic moles are moles that have some of the ABCDE  changes listed above but  are not cancerous  Sometimes a biopsy and microscopic examination are needed to determine the difference  They may indicate an increased risk of melanoma in that person, especially if there is a family history of melanoma  What is a Melanoma? The main concern when looking at a new or changing mole it to evaluate whether it may be a melanoma  The appearance of a "new mole" remains one of the most reliable methods for identifying a malignant melanoma  A melanoma is a type of skin cancer that can be deadly if it spreads throughout the body  The prognosis of a Melanoma depends on how deep it has penetrated in the skin  If caught early, they generally will not have had time to grow into the deeper layers of the skin and they cure rate is then very high   Once the melanoma grows deeper into the skin, the cure rate drops dramatically  Therefore, early detection and removal of a malignant melanoma results in a much better chance of complete cure  ATYPICAL MOLES  Physical Exam:  Anatomic Location Affected:  Back, abdomen  Morphological Description:  numerous mild to moderats atypical moles  Pertinent Positives:  Pertinent Negatives: Additional History of Present Condition:  Present for years; patient had several nevi's removed but non concerning    Assessment and Plan:  Based on a thorough discussion of this condition and the management approach to it (including a comprehensive discussion of the known risks, side effects and potential benefits of treatment), the patient (family) agrees to implement the following specific plan:     Will monitor    Yearly skin exams

## 2022-06-07 NOTE — PROGRESS NOTES
[facility] Note     Patient Name: Elpidio Mitchell  Encounter Date: 06/07/22     Have you been cared for by a [facility] Dermatologist in the last 3 years and, if so, which one? No    · Have you traveled outside of the [address] [place] [address] in the past 3 months or outside of the [facility] area in the last 2 weeks? No     May we call your Preferred Phone number to discuss your specific medical information? Yes     May we leave a detailed message that includes your specific medical information? Yes      Today's Chief Concerns:   Concern #1:  Skin check      Past Medical History:  Have you personally ever had or currently have any of the following? · Skin cancer (such as Melanoma, Basal Cell Carcinoma, Squamous Cell Carcinoma? (If Yes, please provide more detail)- No  · Eczema: No  · Psoriasis: No  · HIV/AIDS: No  · Hepatitis B or C: No  · Tuberculosis: No  · Systemic Immunosuppression such as Diabetes, Biologic or Immunotherapy, Chemotherapy, Organ Transplantation, Bone Marrow Transplantation (If YES, please provide more detail): No  · Radiation Treatment (If YES, please provide more detail): No  · Any other major medical conditions/concerns? (If Yes, which types)- No    Social History:     What is/was your primary occupation?  What are your hobbies/past-times? Family History:  Have any of your "first degree relatives" (parent, brother, sister, or child) had any of the following       · Skin cancer such as Melanoma or Merkel Cell Carcinoma or Pancreatic Cancer? YES, maternal grandmother had hx of MM  · Eczema, Asthma, Hay Fever or Seasonal Allergies: No  · Psoriasis or Psoriatic Arthritis: No  · Do any other medical conditions seem to run in your family? If Yes, what condition and which relatives?   No    Current Medications:         Current Outpatient Medications:     cholecalciferol (VITAMIN D3) 1,000 units tablet, Take 1,000 Units by mouth daily, Disp: , Rfl:     Prenatal Vit-Fe Fumarate-FA (BL PRENATAL VITAMINS PO), Take by mouth, Disp: , Rfl:       Review of Systems:  Have you recently had or currently have any of the following? If YES, what are you doing for the problem? · Fever, chills or unintended weight loss: No  · Sudden loss or change in your vision: No  · Nausea, vomiting or blood in your stool: No  · Painful or swollen joints: No  · Wheezing or cough: No  · Changing mole or non-healing wound: No  · Nosebleeds: No  · Excessive sweating: No  · Easy or prolonged bleeding? No  · Over the last 2 weeks, how often have you been bothered by the following problems? · Taking little interest or pleasure in doing things: 1 - Not at All  · Feeling down, depressed, or hopeless: 1 - Not at All  · Rapid heartbeat with epinephrine:  No    · FEMALES ONLY:    · Are you pregnant or planning to become pregnant? No  · Are you currently or planning to be nursing or breast feeding? No    · Any known allergies? · No Known Allergies      Physical Exam:     Was a chaperone (Derm Clinical Assistant) present throughout the entire Physical Exam? Yes     Did the Dermatology Team specifically  the patient on the importance of a Full Skin Exam to be sure that nothing is missed clinically?  Yes}  o Did the patient ultimately request or accept a Full Skin Exam?  Yes  o Did the patient specifically refuse to have the areas "under-the-bra" examined by the Dermatologist? No  o Did the patient specifically refuse to have the areas "under-the-underwear" examined by the Dermatologist? No    CONSTITUTIONAL:   Vitals:    06/07/22 1042   Temp: (!) 97 3 °F (36 3 °C)   TempSrc: Temporal   Weight: 95 3 kg (210 lb)   Height: 5' 6" (1 676 m)       PSYCH: Normal mood and affect  EYES: Normal conjunctiva  ENT: Normal lips and oral mucosa  CARDIOVASCULAR: No edema  RESPIRATORY: Normal respirations  HEME/LYMPH/IMMUNO:  No regional lymphadenopathy except as noted below in "ASSESSMENT AND PLAN BY DIAGNOSIS"    SKIN:  FULL ORGAN SYSTEM EXAM   Hair, Scalp, Ears, Face Normal except as noted below in Assessment   Neck Normal except as noted below in Assessment   Right Arm/Hand/Fingers Normal except as noted below in Assessment   Left Arm/Hand/Fingers Normal except as noted below in Assessment   Chest/Breasts/Axillae Viewed areas Normal except as noted below in Assessment   Abdomen, Umbilicus Normal except as noted below in Assessment   Back/Spine Normal except as noted below in Assessment   Groin//Buttocks NOT EXAMINED   Right Leg, Foot, Toes Normal except as noted below in Assessment   Left Leg, Foot, Toes Normal except as noted below in Assessment        Assessment and Plan by Diagnosis:    History of Present Condition:     Duration:  How long has this been an issue for you?    o  since child pier[age]   Location Affected:  Where on the body is this affecting you?    o  face arms right leg   Quality:  Is there any bleeding, pain, itch, burning/irritation, or redness associated with the skin lesion? o  denies   Severity:  Describe any bleeding, pain, itch, burning/irritation, or redness on a scale of 1 to 10 (with 10 being the worst)  o  0   Timing:  Does this condition seem to be there pretty constantly or do you notice it more at specific times throughout the day?     o  constant   Context:  Have you ever noticed that this condition seems to be associated with specific activities you do?    o  denies   Modifying Factors:    o Anything that seems to make the condition worse?    -  denies  o What have you tried to do to make the condition better?    -  denies   Associated Signs and Symptoms:  Does this skin lesion seem to be associated with any of the following:    MELANOCYTIC NEVI ("Moles")    Physical Exam:   Anatomic Location Affected:   Mostly on sun-exposed areas of the trunk and extremities   Morphological Description:  Scattered, 1-4mm round to ovoid, symmetrical-appearing, even bordered, skin colored to dark brown macules/papules, mostly in sun-exposed areas   Pertinent Positives:   Pertinent Negatives: Additional History of Present Condition:  Present for years    Assessment and Plan:  Based on a thorough discussion of this condition and the management approach to it (including a comprehensive discussion of the known risks, side effects and potential benefits of treatment), the patient (family) agrees to implement the following specific plan:   Reassured benign     Melanocytic Nevi  Melanocytic nevi ("moles") are caused by collections of the color producing skin cells, or melanocytes, in 1 area in the skin  They can range in color from pink to dark brown and be either raised or flat  Some moles are present at birth (I e , "congenital nevi"), while others come up later in life (i e , "acquired nevi")  Rosalynd Jacks exposure also stimulates the body to make more moles, ie the more sun you get the more moles you'll grow  Clinically distinguishing a healthy mole from melanoma may be difficult  The "ABCDE's" of moles have been suggested as a means of helping to alert a person to a suspicious mole and the possible increased risk of melanoma  Asymmetry: Healthy moles tend to be symmetric, while melanomas are often asymmetric  Asymmetry means if you draw a line through the mole, the two halves do not match in color, size, shape, or surface texture Any mole that starts to demonstrate "asymmetry" should be examined promptly by a board certified dermatologist      Border: Healthy moles tend to have discrete, even borders  The border of a melanoma often blends into the normal skin and does not sharply delineate the mole from normal skin  Any mole that starts to demonstrate "uneven borders" should be examined promptly     Color: Healthy moles tend to be one color throughout  Melanomas tend to be made up of different colors ranging from dark black, blue, white, or red    Any mole that demonstrates a color change should be examined promptly    Diameter: Healthy moles tend to be smaller than 0 6 cm in size; an exception are "congenital nevi" that can be larger  Melanomas tend to grow and can often be greater than 0 6 cm (1/4 of an inch, or the size of a pencil eraser)  This is only a guideline, and many normal moles may be larger than 0 6 cm without being unhealthy  Any mole that starts to change in size (small to bigger or bigger to smaller) should be examined promptly    Evolving: Healthy moles tend to "stay the same "  Melanomas may often show signs of change or evolution such as a change in size, shape, color, or elevation  Any mole that starts to itch, bleed, crust, burn, hurt, or ulcerate or demonstrate a change or evolution should be examined promptly by a board certified dermatologist       What are atypical moles or dysplastic nevi? Dysplastic moles are moles that have some of the ABCDE  changes listed above but  are not cancerous  Sometimes a biopsy and microscopic examination are needed to determine the difference  They may indicate an increased risk of melanoma in that person, especially if there is a family history of melanoma  What is a Melanoma? The main concern when looking at a new or changing mole it to evaluate whether it may be a melanoma  The appearance of a "new mole" remains one of the most reliable methods for identifying a malignant melanoma  A melanoma is a type of skin cancer that can be deadly if it spreads throughout the body  The prognosis of a Melanoma depends on how deep it has penetrated in the skin  If caught early, they generally will not have had time to grow into the deeper layers of the skin and they cure rate is then very high  Once the melanoma grows deeper into the skin, the cure rate drops dramatically  Therefore, early detection and removal of a malignant melanoma results in a much better chance of complete cure         ATYPICAL MOLES  Physical Exam:   Anatomic Location Affected:  Back, abdomen   Morphological Description:  numerous mild to moderate atypical moles   Pertinent Positives:   Pertinent Negatives: Additional History of Present Condition:  Present for years; patient had several nevi's removed but non concerning    Assessment and Plan:  Based on a thorough discussion of this condition and the management approach to it (including a comprehensive discussion of the known risks, side effects and potential benefits of treatment), the patient (family) agrees to implement the following specific plan:     Will monitor    6 months skin exams; pictures are in patients chart      Scribe Attestation    I,:  Sarah Casas am acting as a scribe while in the presence of the attending physician :       I,:  Vaughn Espinal MD personally performed the services described in this documentation    as scribed in my presence :

## 2022-07-14 ENCOUNTER — OFFICE VISIT (OUTPATIENT)
Dept: OBGYN CLINIC | Facility: CLINIC | Age: 27
End: 2022-07-14
Payer: COMMERCIAL

## 2022-07-14 VITALS — BODY MASS INDEX: 33.57 KG/M2 | SYSTOLIC BLOOD PRESSURE: 128 MMHG | DIASTOLIC BLOOD PRESSURE: 64 MMHG | WEIGHT: 208 LBS

## 2022-07-14 DIAGNOSIS — M79.621 AXILLARY TENDERNESS, RIGHT: Primary | ICD-10-CM

## 2022-07-14 DIAGNOSIS — R59.0 AXILLARY LYMPHADENOPATHY: ICD-10-CM

## 2022-07-14 DIAGNOSIS — N92.6 LATE PERIOD: ICD-10-CM

## 2022-07-14 LAB — SL AMB POCT URINE HCG: NEGATIVE

## 2022-07-14 PROCEDURE — 99214 OFFICE O/P EST MOD 30 MIN: CPT | Performed by: PHYSICIAN ASSISTANT

## 2022-07-14 PROCEDURE — 81025 URINE PREGNANCY TEST: CPT | Performed by: PHYSICIAN ASSISTANT

## 2022-07-14 NOTE — PROGRESS NOTES
Assessment/Plan:  - US imaging ordered  - Patient to call for concerns       Diagnoses and all orders for this visit:    Axillary tenderness, right  -     US breast axilla right; Future    Axillary lymphadenopathy  -     US breast axilla right; Future    Late period  -     POCT urine HCG          Subjective:      Patient ID: Chel Dennis is a 32 y o  female  Daniella Glasgow is a 36YO [id number] WF presenting to the office with complaints of a breast/armpit lump x 1 month  Patient states she has had this lymphnode in her right armpit in the past  Over the last few weeks, it has become sore and the soreness sometimes extends into her lateral breast tissue or around to her back  She denies recent illness, fever or inciting event  She denies family history of breast cancer  She is 2 days late for her cycle and is not using contraception  Office UPT negative today  The following portions of the patient's history were reviewed and updated as appropriate: allergies, current medications, past family history, past medical history, past social history, past surgical history and problem list     Review of Systems   Constitutional: Negative for chills, fever and unexpected weight change  Respiratory: Negative for shortness of breath  Cardiovascular: Negative for chest pain  Gastrointestinal: Negative for abdominal pain, diarrhea, nausea and vomiting  Skin: Negative for rash  Psychiatric/Behavioral: Negative for dysphoric mood  The patient is not nervous/anxious  Objective:      /64   Wt 94 3 kg (208 lb)   LMP 06/15/2022 (Exact Date)   Breastfeeding No   BMI 33 57 kg/m²          Physical Exam  Vitals reviewed  Constitutional:       General: She is not in acute distress  Appearance: Normal appearance  She is normal weight  She is not ill-appearing, toxic-appearing or diaphoretic  HENT:      Head: Normocephalic and atraumatic     Pulmonary:      Effort: Pulmonary effort is normal    Chest: Breasts:      Right: Axillary adenopathy (Shoddy, 1cm, firm, mobile mildly tender lymph node at the anterior aspect of right axilla) present  No swelling, bleeding, inverted nipple, mass, nipple discharge, skin change or tenderness  Left: No swelling, bleeding, inverted nipple, mass, nipple discharge, skin change, tenderness or axillary adenopathy  Lymphadenopathy:      Upper Body:      Right upper body: Axillary adenopathy (Shoddy, 1cm, firm, mobile mildly tender lymph node at the anterior aspect of right axilla) present  Left upper body: No axillary adenopathy  Skin:     General: Skin is warm and dry  Neurological:      General: No focal deficit present  Mental Status: She is alert     Psychiatric:         Mood and Affect: Mood normal          Behavior: Behavior normal

## 2022-07-28 ENCOUNTER — HOSPITAL ENCOUNTER (OUTPATIENT)
Dept: ULTRASOUND IMAGING | Facility: CLINIC | Age: 27
Discharge: HOME | End: 2022-07-28
Payer: COMMERCIAL

## 2022-07-28 VITALS — WEIGHT: 207.89 LBS | HEIGHT: 66 IN | BODY MASS INDEX: 33.41 KG/M2

## 2022-07-28 DIAGNOSIS — M79.621 AXILLARY TENDERNESS, RIGHT: ICD-10-CM

## 2022-07-28 DIAGNOSIS — R59.0 AXILLARY LYMPHADENOPATHY: ICD-10-CM

## 2022-07-28 PROCEDURE — 76642 ULTRASOUND BREAST LIMITED: CPT

## 2022-08-23 ENCOUNTER — ANNUAL EXAM (OUTPATIENT)
Dept: OBGYN CLINIC | Facility: CLINIC | Age: 27
End: 2022-08-23
Payer: COMMERCIAL

## 2022-08-23 VITALS
BODY MASS INDEX: 33.43 KG/M2 | DIASTOLIC BLOOD PRESSURE: 62 MMHG | SYSTOLIC BLOOD PRESSURE: 108 MMHG | HEIGHT: 66 IN | WEIGHT: 208 LBS

## 2022-08-23 DIAGNOSIS — E03.8 OTHER SPECIFIED HYPOTHYROIDISM: ICD-10-CM

## 2022-08-23 DIAGNOSIS — Z01.419 WELL WOMAN EXAM WITH ROUTINE GYNECOLOGICAL EXAM: Primary | ICD-10-CM

## 2022-08-23 PROCEDURE — 0503F POSTPARTUM CARE VISIT: CPT | Performed by: OBSTETRICS & GYNECOLOGY

## 2022-08-23 PROCEDURE — 99395 PREV VISIT EST AGE 18-39: CPT | Performed by: OBSTETRICS & GYNECOLOGY

## 2022-08-23 NOTE — PROGRESS NOTES
ASSESSMENT & PLAN:   Diagnoses and all orders for this visit:    Well woman exam with routine gynecological exam    Other specified hypothyroidism  -     TSH, 3rd generation with Free T4 reflex; Future          The following were reviewed in today's visit: ASCCP guidelines, Gardisil vaccination, STD testing breast self exam, family planning choices, exercise and healthy diet  Patient to return to office in yearly for annual exam      All questions have been answered to her satisfaction  CC:  Annual Gynecologic Examination  Chief Complaint   Patient presents with    Gynecologic Exam     Neg pap 20, no hx of mammo, dxa or colonoscopy  HPI: Johnny Roberts is a 32 y o  [id number] who presents for annual gynecologic examination  She has the following concerns:  none      Health Maintenance:    Exercise: intermittently  Breast exams/breast awareness: yes  Diet: well balanced diet      Past Medical History:   Diagnosis Date    Disease of thyroid gland     postpartum thyroiditis    Female infertility     Herpes     2019    PONV (postoperative nausea and vomiting)     Varicella     had chickenpox as a child       Past Surgical History:   Procedure Laterality Date     SECTION      primary for twins, B was breech    OVARIAN CYST REMOVAL Left     dermoid cyst removed, pt still has ovary    KS SURG RX MISSED ABORTN,1ST TRI N/A 2021    Procedure: DILATATION AND EVACUATION (D&E) (8 WEEKS); Surgeon: Joseluis Gonzales MD;  [facility];  Service: Gynecology    SKIN BIOPSY      TONSILLECTOMY  2015    WISDOM TOOTH EXTRACTION         Past OB/Gyn History:  Period Cycle (Days): 28  Period Duration (Days): 4 ot 5 days  Period Pattern: Regular  Menstrual Flow: Moderate, Light  Menstrual Control: Tampon, Thin pad, Maxi pad  Dysmenorrhea: NonePatient's last menstrual period was 2022 (exact date)      Last Pap:  : no abnormalities  History of abnormal Pap smear: no    Patient is currently sexually active     STD testing: no  Current contraception: condoms      Family History  Family History   Problem Relation Age of Onset    No Known Problems Mother     No Known Problems Father     No Known Problems Sister     Melanoma Maternal Grandmother     COPD Maternal Grandmother     Heart disease Maternal Grandfather     Heart attack Maternal Grandfather     Ovarian cancer Paternal Grandmother 48    No Known Problems Paternal Grandfather     No Known Problems Son     No Known Problems Son     No Known Problems Son     No Known Problems Maternal Aunt     No Known Problems Maternal Aunt     No Known Problems Paternal Aunt        Family history of uterine or ovarian cancer: no  Family history of breast cancer: no  Family history of colon cancer: no    Social History:  Social History     Socioeconomic History    Marital status: /Civil Union     Spouse name: Not on file    Number of children: Not on file    Years of education: Not on file    Highest education level: Not on file   Occupational History    Not on file   Tobacco Use    Smoking status: Never Smoker    Smokeless tobacco: Never Used   Vaping Use    Vaping Use: Never used   Substance and Sexual Activity    Alcohol use: No    Drug use: Never    Sexual activity: Yes     Partners: Male     Birth control/protection: None   Other Topics Concern    Not on file   Social History Narrative    Not on file     Social Determinants of Health     Financial Resource Strain: Not on file   Food Insecurity: Not on file   Transportation Needs: Not on file   Physical Activity: Not on file   Stress: Not on file   Social Connections: Not on file   Intimate Partner Violence: Not on file   Housing Stability: Not on file     Domestic violence screen: negative    Allergies:  No Known Allergies    Medications:    Current Outpatient Medications:     cholecalciferol (VITAMIN D3) 1,000 units tablet, Take 1,000 Units by mouth daily, Disp: , Rfl:     Prenatal Vit-Fe Fumarate-FA (BL PRENATAL VITAMINS PO), Take by mouth, Disp: , Rfl:     Review of Systems:  Review of Systems   Constitutional: Negative  HENT: Negative  Respiratory: Negative  Cardiovascular: Negative  Gastrointestinal: Negative  Genitourinary: Negative  Neurological: Negative  Psychiatric/Behavioral: Negative  Physical Exam:  /62   Ht 5' 6" (1 676 m)   Wt 94 3 kg (208 lb)   LMP 08/16/2022 (Exact Date)   Breastfeeding No   BMI 33 57 kg/m²    Physical Exam  Constitutional:       Appearance: Normal appearance  Genitourinary:      Bladder and urethral meatus normal       No lesions in the vagina  Right Labia: No rash, tenderness, lesions, skin changes or Bartholin's cyst      Left Labia: No tenderness, lesions, skin changes, Bartholin's cyst or rash  No vaginal erythema, tenderness or bleeding  Right Adnexa: not tender, not full and no mass present  Left Adnexa: not tender, not full and no mass present  Cervix is parous  No cervical motion tenderness, discharge, lesion or polyp  Uterus is not enlarged, fixed or tender  No uterine mass detected  Urethral meatus caruncle not present  No urethral tenderness or mass present  Breasts:      Right: No swelling, bleeding, inverted nipple, mass, nipple discharge, skin change or tenderness  Left: No swelling, bleeding, inverted nipple, mass, nipple discharge, skin change or tenderness  HENT:      Head: Normocephalic and atraumatic  Eyes:      Extraocular Movements: Extraocular movements intact  Conjunctiva/sclera: Conjunctivae normal       Pupils: Pupils are equal, round, and reactive to light  Cardiovascular:      Rate and Rhythm: Normal rate and regular rhythm  Heart sounds: Normal heart sounds  No murmur heard  Pulmonary:      Effort: Pulmonary effort is normal  No respiratory distress        Breath sounds: Normal breath sounds  No wheezing or rales  Abdominal:      General: There is no distension  Palpations: Abdomen is soft  Tenderness: There is no abdominal tenderness  There is no guarding  Neurological:      General: No focal deficit present  Mental Status: She is alert and oriented to person, place, and time  Skin:     General: Skin is warm and dry  Psychiatric:         Mood and Affect: Mood normal          Behavior: Behavior normal    Vitals and nursing note reviewed

## 2022-09-06 ENCOUNTER — TELEPHONE (OUTPATIENT)
Dept: OBGYN CLINIC | Facility: CLINIC | Age: 27
End: 2022-09-06

## 2022-09-06 DIAGNOSIS — B37.31 VAGINA, CANDIDIASIS: Primary | ICD-10-CM

## 2022-09-06 RX ORDER — FLUCONAZOLE 150 MG/1
150 TABLET ORAL ONCE
Qty: 1 TABLET | Refills: 0 | Status: SHIPPED | OUTPATIENT
Start: 2022-09-06 | End: 2022-09-06

## 2022-09-06 NOTE — TELEPHONE ENCOUNTER
Pt  C/o vaginal itching and irritation since yearly visit a few weeks ago  Declines vaginal d/c or odor  Pt  Tried 1 day monistat without relief and also tried cortisone with minimal relief  Pt  Not able to make it in for an appt  Today  Pt  Hunter Boyd if we could send something in for her       Routing to provider for request 73 yo M with PMhx of vertebro-basilar artery syndrome, DMII, HTN, HLD, OA, urge incontinence, TIA, and recurrent falls presents from rehab center with encephalopathy due to multifactorial etiology.

## 2022-09-07 LAB — TSH SERPL DL<=0.005 MIU/L-ACNC: 0.79 UIU/ML (ref 0.45–4.5)

## 2022-10-19 ENCOUNTER — TELEPHONE (OUTPATIENT)
Dept: OBGYN CLINIC | Facility: CLINIC | Age: 27
End: 2022-10-19

## 2022-10-19 NOTE — TELEPHONE ENCOUNTER
Patient called, she states she is 6 month PP and has been getting regular periods every month up until Sept  She is not breastfeeding  She states her LMP was 9/3  She has taken pregnancy tests that are all negative  She does have a hx of PP thyroditis in her first pregnancy and when that happened she didn't get her period  She was wondering if we could like into her thyroid levels   Her last TSH was done on 9/6 and it was normal

## 2022-10-20 DIAGNOSIS — N91.2 AMENORRHEA: Primary | ICD-10-CM

## 2022-10-22 LAB — TSH SERPL DL<=0.005 MIU/L-ACNC: 1 UIU/ML (ref 0.45–4.5)

## 2022-12-12 ENCOUNTER — OFFICE VISIT (OUTPATIENT)
Dept: DERMATOLOGY | Age: 27
End: 2022-12-12

## 2022-12-12 VITALS — BODY MASS INDEX: 31.66 KG/M2 | WEIGHT: 197 LBS | HEIGHT: 66 IN | TEMPERATURE: 97.6 F

## 2022-12-12 DIAGNOSIS — D22.9 ATYPICAL MOLE: ICD-10-CM

## 2022-12-12 DIAGNOSIS — D22.60 MULTIPLE BENIGN MELANOCYTIC NEVI OF UPPER AND LOWER EXTREMITIES AND TRUNK: Primary | ICD-10-CM

## 2022-12-12 DIAGNOSIS — D22.5 MULTIPLE BENIGN MELANOCYTIC NEVI OF UPPER AND LOWER EXTREMITIES AND TRUNK: Primary | ICD-10-CM

## 2022-12-12 DIAGNOSIS — D22.70 MULTIPLE BENIGN MELANOCYTIC NEVI OF UPPER AND LOWER EXTREMITIES AND TRUNK: Primary | ICD-10-CM

## 2022-12-12 NOTE — PROGRESS NOTES
[address] [facility] Note     Patient Name: Ata Nguyen  Encounter Date: 12/12/2022     Krytsyna Mccray you been cared for by a [facility] Dermatologist in the last 3 years and, if so, which description applies to you? Yes  I have been here within the last 3 years, and my medical history has NOT changed since that time  I am FEMALE/of child-bearing potential     REVIEW OF SYSTEMS:  Have you recently had or currently have any of the following? · No changes in my recent health  PAST MEDICAL HISTORY:  Have you personally ever had or currently have any of the following? If "YES," then please provide more detail  · No changes in my medical history  FAMILY HISTORY:  Any "first degree relatives" (parent, brother, sister, or child) with the following? • No changes in my family's known health  PATIENT EXPERIENCE:    • Do you want the Dermatologist to perform a COMPLETE skin exam today including a clinical examination under the "bra and underwear" areas? Yes  • If necessary, do we have your permission to call and leave a detailed message on your Preferred Phone number that includes your specific medical information?   Yes      No Known Allergies   Current Outpatient Medications:   •  cholecalciferol (VITAMIN D3) 1,000 units tablet, Take 1,000 Units by mouth daily, Disp: , Rfl:   •  Prenatal Vit-Fe Fumarate-FA (BL PRENATAL VITAMINS PO), Take by mouth, Disp: , Rfl:           • Whom besides the patient is providing clinical information about today's encounter?   o NO ADDITIONAL HISTORIAN (patient alone provided history)    Physical Exam and Assessment/Plan by Diagnosis:    MELANOCYTIC NEVI ("Moles")     Physical Exam:  • Anatomic Location Affected:   Mostly on sun-exposed areas of the trunk, face, upper and lower extremities  • Morphological Description:  Scattered, 1-4mm round to ovoid, symmetrical-appearing, even bordered, skin colored to dark brown macules/papules, mostly in sun-exposed areas    • Several atypical nevi's all stable at this time   • Pertinent Positives:  • Pertinent Negatives:     Additional History of Present Condition: Here for skin exam  fam hx of melanoma      Assessment and Plan:  Based on a thorough discussion of this condition and the management approach to it (including a comprehensive discussion of the known risks, side effects and potential benefits of treatment), the patient (family) agrees to implement the following specific plan:  • Monitor for changes  • When outside we recommend using a wide brim hat, sunglasses, long sleeve and pants, sunscreen with SPF 07+ with reapplication every 2 hours, or SPF specific clothing   • Routine Skin exam recommended yearly                  Melanocytic Nevi  Melanocytic nevi ("moles") are caused by collections of the color producing skin cells, or melanocytes, in 1 area in the skin  They can range in color from pink to dark brown and be either raised or flat        Some moles are present at birth (I e , "congenital nevi"), while others come up later in life (i e , "acquired nevi")  [facility] exposure also stimulates the body to make more moles, ie the more sun you get the more moles you'll grow      Clinically distinguishing a healthy mole from melanoma may be difficult  The "ABCDE's" of moles have been suggested as a means of helping to alert a person to a suspicious mole and the possible increased risk of melanoma        Asymmetry: Healthy moles tend to be symmetric, while melanomas are often asymmetric  Asymmetry means if you draw a line through the mole, the two halves do not match in color, size, shape, or surface texture Any mole that starts to demonstrate "asymmetry" should be examined promptly by a board certified dermatologist       Border: Healthy moles tend to have discrete, even borders  The border of a melanoma often blends into the normal skin and does not sharply delineate the mole from normal skin    Any mole that starts to demonstrate "uneven borders" should be examined promptly      Color: Healthy moles tend to be one color throughout  Melanomas tend to be made up of different colors ranging from dark black, blue, white, or red  Any mole that demonstrates a color change should be examined promptly     Diameter: Healthy moles tend to be smaller than 0 6 cm in size; an exception are "congenital nevi" that can be larger  Melanomas tend to grow and can often be greater than 0 6 cm (1/4 of an inch, or the size of a pencil eraser)  This is only a guideline, and many normal moles may be larger than 0 6 cm without being unhealthy  Any mole that starts to change in size (small to bigger or bigger to smaller) should be examined promptly     Evolving: Healthy moles tend to "stay the same "  Melanomas may often show signs of change or evolution such as a change in size, shape, color, or elevation  Any mole that starts to itch, bleed, crust, burn, hurt, or ulcerate or demonstrate a change or evolution should be examined promptly by a board certified dermatologist        What are atypical moles or dysplastic nevi? Dysplastic moles are moles that have some of the ABCDE  changes listed above but  are not cancerous  Sometimes a biopsy and microscopic examination are needed to determine the difference  They may indicate an increased risk of melanoma in that person, especially if there is a family history of melanoma      What is a Melanoma? The main concern when looking at a new or changing mole it to evaluate whether it may be a melanoma  The appearance of a "new mole" remains one of the most reliable methods for identifying a malignant melanoma  A melanoma is a type of skin cancer that can be deadly if it spreads throughout the body  The prognosis of a Melanoma depends on how deep it has penetrated in the skin  If caught early, they generally will not have had time to grow into the deeper layers of the skin and they cure rate is then very high   Once the melanoma grows deeper into the skin, the cure rate drops dramatically   Therefore, early detection and removal of a malignant melanoma results in a much better chance of complete cure      Scribe Attestation    I,:  Gina Mehta am acting as a scribe while in the presence of the attending physician :       I,:  Lawrence Baer MD personally performed the services described in this documentation    as scribed in my presence :

## 2022-12-12 NOTE — PATIENT INSTRUCTIONS
MELANOCYTIC NEVI ("Moles")     Assessment and Plan:  Based on a thorough discussion of this condition and the management approach to it (including a comprehensive discussion of the known risks, side effects and potential benefits of treatment), the patient (family) agrees to implement the following specific plan:  Monitor for changes  When outside we recommend using a wide brim hat, sunglasses, long sleeve and pants, sunscreen with SPF 22+ with reapplication every 2 hours, or SPF specific clothing   Routine Skin exam recommended yearly                  Melanocytic Nevi  Melanocytic nevi ("moles") are caused by collections of the color producing skin cells, or melanocytes, in 1 area in the skin  They can range in color from pink to dark brown and be either raised or flat  Some moles are present at birth (I e , "congenital nevi"), while others come up later in life (i e , "acquired nevi")  [place] exposure also stimulates the body to make more moles, ie the more sun you get the more moles you'll grow  Clinically distinguishing a healthy mole from melanoma may be difficult  The "ABCDE's" of moles have been suggested as a means of helping to alert a person to a suspicious mole and the possible increased risk of melanoma  Asymmetry: Healthy moles tend to be symmetric, while melanomas are often asymmetric  Asymmetry means if you draw a line through the mole, the two halves do not match in color, size, shape, or surface texture Any mole that starts to demonstrate "asymmetry" should be examined promptly by a board certified dermatologist       Border: Healthy moles tend to have discrete, even borders  The border of a melanoma often blends into the normal skin and does not sharply delineate the mole from normal skin  Any mole that starts to demonstrate "uneven borders" should be examined promptly      Color: Healthy moles tend to be one color throughout    Melanomas tend to be made up of different colors ranging from dark black, blue, white, or red  Any mole that demonstrates a color change should be examined promptly     Diameter: Healthy moles tend to be smaller than 0 6 cm in size; an exception are "congenital nevi" that can be larger  Melanomas tend to grow and can often be greater than 0 6 cm (1/4 of an inch, or the size of a pencil eraser)  This is only a guideline, and many normal moles may be larger than 0 6 cm without being unhealthy  Any mole that starts to change in size (small to bigger or bigger to smaller) should be examined promptly     Evolving: Healthy moles tend to "stay the same "  Melanomas may often show signs of change or evolution such as a change in size, shape, color, or elevation    Any mole that starts to itch, bleed, crust, burn, hurt, or ulcerate or demonstrate a change or evolution should be examined promptly by a board certified dermatologist

## 2023-01-11 ENCOUNTER — OFFICE VISIT (OUTPATIENT)
Dept: OBGYN CLINIC | Facility: CLINIC | Age: 28
End: 2023-01-11

## 2023-01-11 VITALS
DIASTOLIC BLOOD PRESSURE: 70 MMHG | WEIGHT: 200.2 LBS | HEIGHT: 66 IN | SYSTOLIC BLOOD PRESSURE: 130 MMHG | BODY MASS INDEX: 32.17 KG/M2

## 2023-01-11 DIAGNOSIS — N91.2 AMENORRHEA: Primary | ICD-10-CM

## 2023-01-11 NOTE — PROGRESS NOTES
Assessment/Plan:  - Viable IUP @ 8w 6d EGA  - FRANCES 2023  - Continue PNV  - Patient to call for concerns  - RTO 2 weeks for OB intake    Diagnoses and all orders for this visit:    Amenorrhea  -     AMB US OB < 14 weeks single or first gestation level 1  -     Ambulatory Referral to Maternal Fetal Medicine; Future    Subjective:       Patient ID: Stefan Craig [id number]        Stefan Craig is a 32 y o  [id number] presenting to the office for pregnancy confirmation  Patient's last menstrual period was 10/22/2022 (exact date)  , placing her at 11w4d today with FRANCES of 2023  Patient has a history of irregular periods and estimates she is closer to 8-9 weeks  She is feeling well and has no complaints  OB History    Para Term  AB Living   6 4 3 1 2 3   SAB IAB Ectopic Multiple Live Births   2 0 0 1 3      # Outcome Date GA Lbr Agusto/2nd Weight Sex Delivery Anes PTL Lv   6 Term 22 39w4d / 00:35 3390 g (7 lb 7 6 oz) M Vag-Spont EPI N SIL      Complications: Other Excessive Bleeding   5 SAB 2021           4A  20 36w3d  2660 g (5 lb 13 8 oz) M CS-LTranv Spinal Y SIL   4B  20 36w3d  2235 g (4 lb 14 8 oz) M CS-LTranv Spinal Y SIL   3 SAB 2019 5w0d    SAB         Complications: Chemical pregnancy   2 Term            1 Term               Obstetric Comments   Menarche 15         The following portions of the patient's history were reviewed and updated as appropriate: allergies, current medications, past family history, past medical history, past social history, past surgical history and problem list     Allergies:  Patient has no known allergies  Medications:    Current Outpatient Medications:   •  Prenatal Vit-Fe Fumarate-FA (BL PRENATAL VITAMINS PO), Take by mouth, Disp: , Rfl:       Review of Systems:   Review of Systems   Constitutional: Negative for chills, fever and unexpected weight change  Respiratory: Negative for shortness of breath  Cardiovascular: Negative for chest pain  Gastrointestinal: Negative for abdominal pain  Skin: Negative for rash  Objective:       Visit Vitals  /70 (BP Location: Left arm, Patient Position: Sitting, Cuff Size: Standard)   Ht 5' 6" (1 676 m)   Wt 90 8 kg (200 lb 3 2 oz)   LMP 10/22/2022 (Exact Date)   BMI 32 31 kg/m²   OB Status Unknown   Smoking Status Never   BSA 2 m²        GEN: The patient was alert and oriented x3, pleasant well-appearing female in no acute distress  CV: Regular rate  PULM: nonlabored respirations  MSK: Normal gait  : WNL  Skin: warm, dry  Neuro: no focal deficits  Psych: normal affect and judgement, cooperative    Ultrasound:     Viability US     Gestational sac: present               Location: intrauterine  Yolk sac: present  Fetal pole: present               CRL: 2 20 cm = 8w6d  Cardiac activity: present               Rate: 168 bpm     Ovaries: normal appearing bilaterally  Cul de sac: absence of free fluid  Uterus: normal in appearance           Ultrasound Probe Disinfection    A transvaginal ultrasound was performed     Prior to use, disinfection was performed with High Level Disinfection Process (Trophon)  Probe serial number [id number]: [id number] was used    Osmany Boone PA-C  01/11/23  11:55 AM

## 2023-01-27 ENCOUNTER — INITIAL PRENATAL (OUTPATIENT)
Dept: OBGYN CLINIC | Facility: CLINIC | Age: 28
End: 2023-01-27

## 2023-01-27 VITALS
SYSTOLIC BLOOD PRESSURE: 112 MMHG | WEIGHT: 196.6 LBS | DIASTOLIC BLOOD PRESSURE: 62 MMHG | BODY MASS INDEX: 31.6 KG/M2 | HEIGHT: 66 IN

## 2023-01-27 DIAGNOSIS — O34.219 HISTORY OF CESAREAN DELIVERY, CURRENTLY PREGNANT: Primary | ICD-10-CM

## 2023-01-27 DIAGNOSIS — Z3A.11 11 WEEKS GESTATION OF PREGNANCY: ICD-10-CM

## 2023-01-27 NOTE — PROGRESS NOTES
OB INTAKE INTERVIEW  Pt presents for OB intake  Plan:  - Prenatal labs ordered   -TSH- Hx of thyroiditis after twin delivery in    - Referral given for MFM   -NT scheduled for  and Level 2   - Reviewed Genetic testing options   -Patient is carrier for CF, FOB negative carrier   - Patient to call for concerns  - RTO 4 weeks for OB F/U visit and PAP/Cultures       [id number]  OB History    Para Term  AB Living   5 2 1 1 2 3   SAB IAB Ectopic Multiple Live Births   2 0 0 1 3      # Outcome Date GA Lbr Agusto/2nd Weight Sex Delivery Anes PTL Lv   5 Current            4 Term 22 39w4d / 00:35 3390 g (7 lb 7 6 oz) M Vag-Spont EPI N SIL      Complications: Other Excessive Bleeding   3 SAB 2021           2A  20 36w3d  2660 g (5 lb 13 8 oz) M CS-LTranv Spinal Y SIL   2B  20 36w3d  2235 g (4 lb 14 8 oz) M CS-LTranv Spinal Y SIL   1 SAB 2019 5w0d    SAB         Complications: Chemical pregnancy      Obstetric Comments   Menarche 15     Hx of  delivery prior to 36 weeks 6 days: Yes, Twins in   Last Menstrual Period:    Patient's last menstrual period was 10/22/2022 (exact date)  Ultrasound date:  23 8 weeks 6 days     Estimated Date of Delivery: 23  Current Issues:  Constipation :   No  Headaches :   Yes  Cramping:  No  Spotting :   No    Interview education  • [facility] Pregnancy Essentials reviewed and discussed   • Baby and [address] Handout  • [facility] MFM Handout  • Discussed genetic testing  • Prenatal lab work: Scripts printed and given to pt  • Influenza vaccine given today: No  • Discussed Tdap vaccine     Immunizations:   Immunization History   Administered Date(s) Administered   • Rho (D) Immune Globulin 2020, 2020, 2021, 2021, 2022         Prior Pregnancy Delivery Complications   History of  delivery or PPROM: Yes, twin delivery at 36w3d in   History of Shoulder Dystocia: No   History of vacuum or forceps delivery: No   History of 3rd/4th degree laceration: No   History of  section: Yes     Diabetes              Pregestational DM: No              hx of GDM: No              BMI >35: No              first degree relative with type 2 diabetes: No              hx of PCOS: No              current metformin use: No              prior hx of LGA/macrosomia:No                    Hypertension              Hx of chronic HTN: No              hx of gestational HTN: No              hx of preeclampsia, eclampsia, or HELLP syndrome:  No              Family h/o preeclampsia: No              Age 28 or older: No              Multifetal gestation: No  Type 1 or Type 2 DM: No  Renal Disease: No  Autoimmune disease (systemic lupus erythematosus, antiphospholipid antibody syndrome): No  Nulliparity: No  Obesity (BMI over 30): Yes  More than 10 year pregnancy interval: No  Previous IUGR, low birthweight or small for gestational age: No     Immunizations:              influenza vaccine: Declined               discussed Tdap vaccine administration at 27-28 weeks   Covid Vaccination: [address] visit with last 6 months - Yes  PHQ-2/9 score: 0       MyChart activated (not 1518 years of age)?: Yes    The patient was oriented to our practice and all questions were answered    Interviewed by: Nelda Torres RN 23

## 2023-02-09 ENCOUNTER — ROUTINE PRENATAL (OUTPATIENT)
Dept: PERINATAL CARE | Facility: CLINIC | Age: 28
End: 2023-02-09

## 2023-02-09 VITALS
HEART RATE: 97 BPM | DIASTOLIC BLOOD PRESSURE: 60 MMHG | SYSTOLIC BLOOD PRESSURE: 118 MMHG | HEIGHT: 66 IN | WEIGHT: 195.6 LBS | BODY MASS INDEX: 31.43 KG/M2

## 2023-02-09 DIAGNOSIS — Z36.82 ENCOUNTER FOR (NT) NUCHAL TRANSLUCENCY SCAN: Primary | ICD-10-CM

## 2023-02-09 DIAGNOSIS — O34.219 HISTORY OF CESAREAN DELIVERY, ANTEPARTUM: ICD-10-CM

## 2023-02-09 DIAGNOSIS — Z3A.13 13 WEEKS GESTATION OF PREGNANCY: ICD-10-CM

## 2023-02-09 DIAGNOSIS — N91.2 AMENORRHEA: ICD-10-CM

## 2023-02-09 DIAGNOSIS — O09.899 SHORT INTERVAL BETWEEN PREGNANCIES AFFECTING PREGNANCY, ANTEPARTUM: ICD-10-CM

## 2023-02-09 NOTE — PROGRESS NOTES
Patient chose to have [facility] Non-invasive Prenatal Screen without fetal sex  Patient given brochure and is aware [facility] will contact their insurance and coordinate coverage  Patient made aware she will need to respond to text message or e-mail from [employer] within 2 business days or testing will be run through insurance  Patient informed text message will come from area code  "415"  Provided [employer] # [phone] and web site : [email or web address]    "Brandon your test online" card with barcode and test tube ID provided to patient  Reviewed [facility]'s web site states 5-7 business days for results via their portal    [employer] message will be sent to patient when MFM receives results /provider reviews  2 vials of blood drawn from left arm by Fam Stinson  Patient tolerated blood draw without difficulty  Specimens labeled with patient identifiers (name, date of birth, specimen collection date), order and specimen were verified with patient, packed and sent via [employer]  Copy of lab order scanned to Epic media  Maternal Fetal Medicine will have results in approximately 7-10 business days and will call patient or notify via [address]  Patient aware viewing lab result online will reveal fetal sex if ordered  Patient verbalized understanding of all instructions and no questions at this time

## 2023-02-09 NOTE — LETTER
February 9, 2023     ROOSE[address]ELT Marcos   [address] [facility]    Patient: Claudean Mo   YOB: 1995   Date of Visit: 2/9/2023       Dear Dr MURGUIA [facility]: Thank you for referring Claudean Mo to me for evaluation  Below are my notes for this consultation  If you have questions, please do not hesitate to call me  I look forward to following your patient along with you  Sincerely,        Pablo Siddiqui MD        CC: No Recipients  Pablo Siddiqui MD  2/9/2023  6:08 PM  Sign when Signing Visit  [address] [address]: Ms Le Porras was seen today for nuchal translucency ultrasound  See ultrasound report under "OB Procedures" tab  Review of Systems   Constitutional: Negative for chills, fever and unexpected weight change  HENT: Negative for congestion, dental problem, facial swelling and sore throat  Eyes: Negative for visual disturbance  Respiratory: Negative for cough and shortness of breath  Cardiovascular: Negative for chest pain and palpitations  Gastrointestinal: Negative for diarrhea and vomiting  Endocrine: Negative for polydipsia  Genitourinary: Negative for dysuria and vaginal bleeding  Musculoskeletal: Negative for back pain and joint swelling  Skin: Negative for rash and wound  Allergic/Immunologic: Negative for immunocompromised state  Neurological: Negative for seizures and headaches  Hematological: Does not bruise/bleed easily  Psychiatric/Behavioral: Negative for dysphoric mood, hallucinations and suicidal ideas  Physical Exam  Constitutional:       General: She is not in acute distress  Appearance: Normal appearance  HENT:      Head: Normocephalic and atraumatic  Eyes:      Extraocular Movements: Extraocular movements intact  Cardiovascular:      Rate and Rhythm: Normal rate  Pulmonary:      Effort: Pulmonary effort is normal  No respiratory distress     Skin: Findings: No erythema or rash  Neurological:      Mental Status: She is alert and oriented to person, place, and time  Psychiatric:         Mood and Affect: Mood normal          Behavior: Behavior normal          Please don't hesitate to contact our office with any concerns or questions    -Jv Trivedi MD

## 2023-02-09 NOTE — PROGRESS NOTES
[address]: Ms Arlin Jiang was seen today for nuchal translucency ultrasound  See ultrasound report under "OB Procedures" tab  Review of Systems   Constitutional: Negative for chills, fever and unexpected weight change  HENT: Negative for congestion, dental problem, facial swelling and sore throat  Eyes: Negative for visual disturbance  Respiratory: Negative for cough and shortness of breath  Cardiovascular: Negative for chest pain and palpitations  Gastrointestinal: Negative for diarrhea and vomiting  Endocrine: Negative for polydipsia  Genitourinary: Negative for dysuria and vaginal bleeding  Musculoskeletal: Negative for back pain and joint swelling  Skin: Negative for rash and wound  Allergic/Immunologic: Negative for immunocompromised state  Neurological: Negative for seizures and headaches  Hematological: Does not bruise/bleed easily  Psychiatric/Behavioral: Negative for dysphoric mood, hallucinations and suicidal ideas  Physical Exam  Constitutional:       General: She is not in acute distress  Appearance: Normal appearance  HENT:      Head: Normocephalic and atraumatic  Eyes:      Extraocular Movements: Extraocular movements intact  Cardiovascular:      Rate and Rhythm: Normal rate  Pulmonary:      Effort: Pulmonary effort is normal  No respiratory distress  Skin:     Findings: No erythema or rash  Neurological:      Mental Status: She is alert and oriented to person, place, and time  Psychiatric:         Mood and Affect: Mood normal          Behavior: Behavior normal          Please don't hesitate to contact our office with any concerns or questions    -Rolo Bustos MD

## 2023-02-10 LAB
EXTERNAL HIV SCREEN: NORMAL
HCV AB SER-ACNC: NEGATIVE

## 2023-02-11 LAB
ABO GROUP BLD: NORMAL
BACTERIA UR CULT: NORMAL
BASOPHILS # BLD AUTO: 0 X10E3/UL (ref 0–0.2)
BASOPHILS NFR BLD AUTO: 0 %
BLD GP AB SCN SERPL QL: NEGATIVE
EOSINOPHIL # BLD AUTO: 0 X10E3/UL (ref 0–0.4)
EOSINOPHIL NFR BLD AUTO: 0 %
ERYTHROCYTE [DISTWIDTH] IN BLOOD BY AUTOMATED COUNT: 12.3 % (ref 11.7–15.4)
HBV SURFACE AB SER QL: NON REACTIVE
HCT VFR BLD AUTO: 36.6 % (ref 34–46.6)
HCV AB S/CO SERPL IA: 0.2 S/CO RATIO (ref 0–0.9)
HGB BLD-MCNC: 12.8 G/DL (ref 11.1–15.9)
HIV 1+2 AB+HIV1 P24 AG SERPL QL IA: NON REACTIVE
IMM GRANULOCYTES # BLD: 0 X10E3/UL (ref 0–0.1)
IMM GRANULOCYTES NFR BLD: 0 %
LYMPHOCYTES # BLD AUTO: 2.7 X10E3/UL (ref 0.7–3.1)
LYMPHOCYTES NFR BLD AUTO: 28 %
Lab: NO GROWTH
MCH RBC QN AUTO: 30.3 PG (ref 26.6–33)
MCHC RBC AUTO-ENTMCNC: 35 G/DL (ref 31.5–35.7)
MCV RBC AUTO: 87 FL (ref 79–97)
MONOCYTES # BLD AUTO: 0.6 X10E3/UL (ref 0.1–0.9)
MONOCYTES NFR BLD AUTO: 6 %
NEUTROPHILS # BLD AUTO: 6.1 X10E3/UL (ref 1.4–7)
NEUTROPHILS NFR BLD AUTO: 66 %
PLATELET # BLD AUTO: 269 X10E3/UL (ref 150–450)
RBC # BLD AUTO: 4.22 X10E6/UL (ref 3.77–5.28)
RH BLD: NEGATIVE
RPR SER QL: NON REACTIVE
RUBV IGG SERPL IA-ACNC: 25.6 INDEX
TSH SERPL DL<=0.005 MIU/L-ACNC: 0.72 UIU/ML (ref 0.45–4.5)
VZV IGG SER IA-ACNC: 3338 INDEX
WBC # BLD AUTO: 9.4 X10E3/UL (ref 3.4–10.8)

## 2023-02-13 ENCOUNTER — ROUTINE PRENATAL (OUTPATIENT)
Dept: OBGYN CLINIC | Facility: CLINIC | Age: 28
End: 2023-02-13

## 2023-02-13 VITALS — DIASTOLIC BLOOD PRESSURE: 62 MMHG | BODY MASS INDEX: 31.8 KG/M2 | WEIGHT: 197 LBS | SYSTOLIC BLOOD PRESSURE: 120 MMHG

## 2023-02-13 DIAGNOSIS — Z67.91 RH NEGATIVE STATE IN ANTEPARTUM PERIOD, FIRST TRIMESTER: Primary | ICD-10-CM

## 2023-02-13 DIAGNOSIS — Z3A.13 13 WEEKS GESTATION OF PREGNANCY: ICD-10-CM

## 2023-02-13 DIAGNOSIS — O26.891 RH NEGATIVE STATE IN ANTEPARTUM PERIOD, FIRST TRIMESTER: Primary | ICD-10-CM

## 2023-02-13 DIAGNOSIS — O34.219 HISTORY OF CESAREAN DELIVERY, CURRENTLY PREGNANT: ICD-10-CM

## 2023-02-13 NOTE — PROGRESS NOTES
Patient is a 33 YO [id number] female presenting to the office at 13 4 weeks for routine OB care  BP: 120/62  TWlb  Fetal Movement: none yet    32 y o  [id number] female at 13w4d (Estimated Date of Delivery: 23) for PNV  Pre-Teofilo Vitals    Flowsheet Row Most Recent Value   Prenatal Assessment    Movement Absent   Prenatal Vitals    Blood Pressure 120/62   Weight - Scale 89 4 kg (197 lb)   Urine Albumin/Glucose    Dilation/Effacement/Station    Vaginal Drainage    Edema          kg (2 lb)    Cramping: no  Bleeding: no  LOF: no  NT/13 week scan scheduled: yes  Anatomy scan scheduled   AFP ordered if indicated: no  Prenatal labs complete (including Heb B, HIV): yes; date completed 2/10/23 (awaiting results of hep b/c and rpr)  Pap collected: pt declines pap until postpartum  GC collected:yes  OK to transfuse and code  Oriented to practice/delivery location     RTO 4 weeks

## 2023-02-14 LAB
C TRACH RRNA SPEC QL NAA+PROBE: NOT DETECTED
N GONORRHOEA RRNA SPEC QL NAA+PROBE: NOT DETECTED

## 2023-03-02 ENCOUNTER — TELEPHONE (OUTPATIENT)
Dept: OBGYN CLINIC | Facility: CLINIC | Age: 28
End: 2023-03-02

## 2023-03-02 NOTE — TELEPHONE ENCOUNTER
Pt  Reports upper abdominal pain all day  Declines n/v/diarrhea, indigestion  Last BM was yesterday, normal      Declines cramping/pelvic pain  Declines urinary issues  Declines vaginal bleeding, itching, odor, discharge  Advised increased hydration, can try pepcid/tums and tylenol for relief  Advised to Call with worsening or report to ED with severe pain

## 2023-03-03 ENCOUNTER — ROUTINE PRENATAL (OUTPATIENT)
Dept: OBGYN CLINIC | Facility: CLINIC | Age: 28
End: 2023-03-03

## 2023-03-03 VITALS — SYSTOLIC BLOOD PRESSURE: 110 MMHG | BODY MASS INDEX: 31.64 KG/M2 | DIASTOLIC BLOOD PRESSURE: 66 MMHG | WEIGHT: 196 LBS

## 2023-03-03 DIAGNOSIS — O34.219 HISTORY OF CESAREAN DELIVERY, CURRENTLY PREGNANT: ICD-10-CM

## 2023-03-03 DIAGNOSIS — Z67.91 RH NEGATIVE STATE IN ANTEPARTUM PERIOD, SECOND TRIMESTER: Primary | ICD-10-CM

## 2023-03-03 DIAGNOSIS — O26.892 RH NEGATIVE STATE IN ANTEPARTUM PERIOD, SECOND TRIMESTER: Primary | ICD-10-CM

## 2023-03-03 DIAGNOSIS — Z3A.16 16 WEEKS GESTATION OF PREGNANCY: ICD-10-CM

## 2023-03-03 NOTE — PROGRESS NOTES
Patient is here today due to she has been experiencing some upper abdominal pain, she describes the pain as regular/constant BH contractions    She denies any cramping, pelvic pain, vaginal bleeding/leaking, odor, itch or discharge  Urine dip neg/neg

## 2023-03-03 NOTE — PROGRESS NOTES
Patient is a 33 YO [id number] female presenting to the office at 16 1 weeks for routine OB care  BP: 110/66  TWlb  Fetal Movement: some movement  Feeling ok today  Patient reports fairly consistent BH ctx near her naval for the last 2 days  She states they are slightly improved today after rest and hydration  Denies LOF or VB  Recommend continued rest and hydration, tylenol if needed  Pt to call if any changes in symptoms  Also reports increased mood changes  Will check vit levels     AFP ordered  Reviewed precautions  Anatomy scan scheduled  Call for concerns  RTO 4 weeks

## 2023-03-15 ENCOUNTER — ROUTINE PRENATAL (OUTPATIENT)
Dept: OBGYN CLINIC | Facility: CLINIC | Age: 28
End: 2023-03-15

## 2023-03-15 VITALS — WEIGHT: 199 LBS | SYSTOLIC BLOOD PRESSURE: 98 MMHG | BODY MASS INDEX: 32.12 KG/M2 | DIASTOLIC BLOOD PRESSURE: 66 MMHG

## 2023-03-15 DIAGNOSIS — O09.892 SHORT INTERVAL BETWEEN PREGNANCIES AFFECTING PREGNANCY IN SECOND TRIMESTER, ANTEPARTUM: Primary | ICD-10-CM

## 2023-03-15 DIAGNOSIS — Z3A.17 17 WEEKS GESTATION OF PREGNANCY: ICD-10-CM

## 2023-03-15 LAB
25(OH)D3+25(OH)D2 SERPL-MCNC: 24.3 NG/ML (ref 30–100)
2ND TRIMESTER 4 SCREEN SERPL-IMP: NORMAL
AFP ADJ MOM SERPL: 1.08
AFP INTERP AMN-IMP: NORMAL
AFP INTERP SERPL-IMP: NORMAL
AFP INTERP SERPL-IMP: NORMAL
AFP SERPL-MCNC: 31.8 NG/ML
AGE AT DELIVERY: 28.4 YR
FOLATE SERPL-MCNC: 17 NG/ML
GA METHOD: NORMAL
GA: 16.1 WEEKS
IDDM PATIENT QL: NO
MULTIPLE PREGNANCY: NO
NEURAL TUBE DEFECT RISK FETUS: 9540 %
VIT B12 SERPL-MCNC: 261 PG/ML (ref 232–1245)

## 2023-03-15 RX ORDER — MELATONIN
1000 DAILY
COMMUNITY

## 2023-03-15 RX ORDER — UREA 10 %
120 LOTION (ML) TOPICAL 2 TIMES DAILY
COMMUNITY

## 2023-03-15 NOTE — PROGRESS NOTES
32 y o  [id number] female at [address] [place] [address] (Estimated Date of Delivery: 23) for PNV  Pre- Vitals    Flowsheet Row Most Recent Value   Prenatal Assessment    Movement Present   Prenatal Vitals    Blood Pressure 98/66   Weight - Scale 90 3 kg (199 lb)   Urine Albumin/Glucose    Dilation/Effacement/Station    Vaginal Drainage    Edema          kg (4 lb)  C/o excessive thirst, cbc is wnl    Will order early glucola  Scheduled for Level II u/s

## 2023-04-12 PROBLEM — O99.212 OBESITY AFFECTING PREGNANCY IN SECOND TRIMESTER: Status: ACTIVE | Noted: 2023-04-12

## 2023-04-12 PROBLEM — O09.899 HISTORY OF PRETERM DELIVERY, CURRENTLY PREGNANT: Status: ACTIVE | Noted: 2023-04-12

## 2023-04-13 PROBLEM — O35.EXX0 PYELECTASIS OF FETUS ON PRENATAL ULTRASOUND: Status: ACTIVE | Noted: 2023-04-13

## 2023-05-04 ENCOUNTER — ULTRASOUND (OUTPATIENT)
Dept: PERINATAL CARE | Facility: CLINIC | Age: 28
End: 2023-05-04

## 2023-05-04 VITALS
DIASTOLIC BLOOD PRESSURE: 68 MMHG | BODY MASS INDEX: 34.13 KG/M2 | WEIGHT: 212.4 LBS | SYSTOLIC BLOOD PRESSURE: 124 MMHG | HEIGHT: 66 IN | HEART RATE: 99 BPM

## 2023-05-04 DIAGNOSIS — Z3A.25 25 WEEKS GESTATION OF PREGNANCY: ICD-10-CM

## 2023-05-04 DIAGNOSIS — Z36.2 ENCOUNTER FOR OTHER ANTENATAL SCREENING FOLLOW-UP: Primary | ICD-10-CM

## 2023-05-04 NOTE — PROGRESS NOTES
The patient was seen today for an ultrasound  Please see ultrasound report (located under Ob Procedures) for additional details  Thank you very much for allowing us to participate in the care of this very nice patient  Should you have any questions, please do not hesitate to contact me  Eugene Wooten MD [address] [facility]  Attending Physician, Luly

## 2023-05-09 ENCOUNTER — ROUTINE PRENATAL (OUTPATIENT)
Dept: OBGYN CLINIC | Facility: CLINIC | Age: 28
End: 2023-05-09

## 2023-05-09 VITALS — SYSTOLIC BLOOD PRESSURE: 122 MMHG | BODY MASS INDEX: 34.22 KG/M2 | WEIGHT: 212 LBS | DIASTOLIC BLOOD PRESSURE: 74 MMHG

## 2023-05-09 DIAGNOSIS — Z3A.25 25 WEEKS GESTATION OF PREGNANCY: Primary | ICD-10-CM

## 2023-05-09 DIAGNOSIS — O34.219 HISTORY OF CESAREAN DELIVERY, ANTEPARTUM: ICD-10-CM

## 2023-05-09 DIAGNOSIS — O09.899 HISTORY OF PRETERM DELIVERY, CURRENTLY PREGNANT: ICD-10-CM

## 2023-05-09 DIAGNOSIS — O09.899 SHORT INTERVAL BETWEEN PREGNANCIES AFFECTING PREGNANCY, ANTEPARTUM: ICD-10-CM

## 2023-05-09 DIAGNOSIS — O99.212 OBESITY AFFECTING PREGNANCY IN SECOND TRIMESTER: ICD-10-CM

## 2023-05-09 NOTE — PROGRESS NOTES
29 y o  [id number] female at 25w10d (Estimated Date of Delivery: 23) for PNV  Pt would like to do the fresh test instead of the glucose  Orders written  Pre- Vitals    Flowsheet Row Most Recent Value   Prenatal Assessment    Fetal Heart Rate 155   Movement Present   Prenatal Vitals    Blood Pressure 122/74   Weight - Scale 96 2 kg (212 lb)   Urine Albumin/Glucose    Dilation/Effacement/Station    Vaginal Drainage    Edema          kg (17 lb)    Leakage of fluid:NO  Vaginal bleeding: NO  Contractions/Cramping:Pt was a little crampy today she states its BH  Fetal movement: YES    RTO in 2 weeks

## 2023-05-17 ENCOUNTER — TELEPHONE (OUTPATIENT)
Dept: POSTPARTUM | Facility: CLINIC | Age: 28
End: 2023-05-17

## 2023-05-17 NOTE — TELEPHONE ENCOUNTER
Doug Dillon has a Spectra S2 that she used for her first child for 6 weeks pumping 6-8 times a day  She also used a portable pump at times  Her plan is primarily or exclusively pump for her new child for the first 6-8 weeks as well  I encouraged Jan to reach out to [employer] for instructions on how to determine how many hours of use are on her current pump to determine if it is a good option for use for the new infant  I did recommend that she replace the flange kits  We discussed that her insurance may not offer a new pump given the short interval between pregnancies but if they do, she may have the option of selecting a portable or wearable pump to use in conjunction with her Spectra

## 2023-05-17 NOTE — TELEPHONE ENCOUNTER
Hany Degree called - she is currently 27wks - has question on her pump - she had previously exclusively pumped for 6wks only for her son  She is asking if its best to get a new pump for this new baby or getting replacement parts ok to do

## 2023-05-19 LAB
ABO GROUP BLD: NORMAL
BASOPHILS # BLD AUTO: 0 X10E3/UL (ref 0–0.2)
BASOPHILS NFR BLD AUTO: 0 %
BLD GP AB SCN SERPL QL: NEGATIVE
EOSINOPHIL # BLD AUTO: 0.1 X10E3/UL (ref 0–0.4)
EOSINOPHIL NFR BLD AUTO: 1 %
ERYTHROCYTE [DISTWIDTH] IN BLOOD BY AUTOMATED COUNT: 12.3 % (ref 11.7–15.4)
GLUCOSE 1H P 50 G GLC PO SERPL-MCNC: 92 MG/DL (ref 70–139)
HCT VFR BLD AUTO: 36.8 % (ref 34–46.6)
HGB BLD-MCNC: 12.5 G/DL (ref 11.1–15.9)
IMM GRANULOCYTES # BLD: 0 X10E3/UL (ref 0–0.1)
IMM GRANULOCYTES NFR BLD: 0 %
LYMPHOCYTES # BLD AUTO: 2.1 X10E3/UL (ref 0.7–3.1)
LYMPHOCYTES NFR BLD AUTO: 21 %
MCH RBC QN AUTO: 29.9 PG (ref 26.6–33)
MCHC RBC AUTO-ENTMCNC: 34 G/DL (ref 31.5–35.7)
MCV RBC AUTO: 88 FL (ref 79–97)
MONOCYTES # BLD AUTO: 0.5 X10E3/UL (ref 0.1–0.9)
MONOCYTES NFR BLD AUTO: 6 %
NEUTROPHILS # BLD AUTO: 6.9 X10E3/UL (ref 1.4–7)
NEUTROPHILS NFR BLD AUTO: 72 %
PLATELET # BLD AUTO: 229 X10E3/UL (ref 150–450)
RBC # BLD AUTO: 4.18 X10E6/UL (ref 3.77–5.28)
RH BLD: NEGATIVE
RPR SER QL: NON REACTIVE
WBC # BLD AUTO: 9.7 X10E3/UL (ref 3.4–10.8)

## 2023-05-30 ENCOUNTER — ROUTINE PRENATAL (OUTPATIENT)
Dept: OBGYN CLINIC | Facility: CLINIC | Age: 28
End: 2023-05-30

## 2023-05-30 VITALS — WEIGHT: 215 LBS | DIASTOLIC BLOOD PRESSURE: 68 MMHG | SYSTOLIC BLOOD PRESSURE: 120 MMHG | BODY MASS INDEX: 34.7 KG/M2

## 2023-05-30 DIAGNOSIS — O09.899 SHORT INTERVAL BETWEEN PREGNANCIES AFFECTING PREGNANCY, ANTEPARTUM: ICD-10-CM

## 2023-05-30 DIAGNOSIS — O34.219 HISTORY OF CESAREAN DELIVERY, ANTEPARTUM: ICD-10-CM

## 2023-05-30 DIAGNOSIS — O26.893 RH NEGATIVE STATUS DURING PREGNANCY IN THIRD TRIMESTER: Primary | ICD-10-CM

## 2023-05-30 DIAGNOSIS — Z67.91 RH NEGATIVE STATUS DURING PREGNANCY IN THIRD TRIMESTER: Primary | ICD-10-CM

## 2023-05-30 NOTE — PROGRESS NOTES
Pt is due for red folder/breast pump and Rhogam  Declines Tdap  No concerns today  Rhogam given in left arm and neg urine dip

## 2023-05-30 NOTE — PROGRESS NOTES
29 y o  [id number]  female at 30 7 wga for PNV  BP : 120/68  TW    Glucola, RPR and CBC reviewed today  RhoGam needed Yes, given  Tdap needed Yes - declined  [address] reviewed  28 week booklet, Baby and Me and birth plan given and reviewed today     Consent signed, full code, ok with blood transfusion  F/u 2 weeks  Delayed cord clamping, skin to skin, rooming in and breast feeding reviewed

## 2023-06-13 ENCOUNTER — ROUTINE PRENATAL (OUTPATIENT)
Dept: OBGYN CLINIC | Facility: CLINIC | Age: 28
End: 2023-06-13

## 2023-06-13 VITALS — DIASTOLIC BLOOD PRESSURE: 70 MMHG | BODY MASS INDEX: 35.51 KG/M2 | WEIGHT: 220 LBS | SYSTOLIC BLOOD PRESSURE: 122 MMHG

## 2023-06-13 DIAGNOSIS — Z3A.30 30 WEEKS GESTATION OF PREGNANCY: ICD-10-CM

## 2023-06-13 DIAGNOSIS — O09.899 SHORT INTERVAL BETWEEN PREGNANCIES AFFECTING PREGNANCY, ANTEPARTUM: Primary | ICD-10-CM

## 2023-06-13 DIAGNOSIS — S39.92XA INJURY OF COCCYX, INITIAL ENCOUNTER: ICD-10-CM

## 2023-06-13 PROCEDURE — PNV: Performed by: OBSTETRICS & GYNECOLOGY

## 2023-06-13 NOTE — PROGRESS NOTES
29 y o  [id number]  female at 35 9 wga for PNV  BP : 122/70  TW  Feeling well    No complaints  Referral to pelvic floor PT provided  Reviewed PTL precautions and FKCs  Growth at 32weeks  F/u 2 weeks

## 2023-06-22 ENCOUNTER — ULTRASOUND (OUTPATIENT)
Facility: HOSPITAL | Age: 28
End: 2023-06-22
Payer: COMMERCIAL

## 2023-06-22 VITALS
SYSTOLIC BLOOD PRESSURE: 116 MMHG | WEIGHT: 223.4 LBS | DIASTOLIC BLOOD PRESSURE: 64 MMHG | HEIGHT: 66 IN | HEART RATE: 90 BPM | BODY MASS INDEX: 35.9 KG/M2

## 2023-06-22 DIAGNOSIS — Z36.89 ENCOUNTER FOR ULTRASOUND TO CHECK FETAL GROWTH: ICD-10-CM

## 2023-06-22 DIAGNOSIS — O34.219 HISTORY OF CESAREAN DELIVERY, ANTEPARTUM: ICD-10-CM

## 2023-06-22 DIAGNOSIS — O35.EXX0 PYELECTASIS OF FETUS ON PRENATAL ULTRASOUND: Primary | ICD-10-CM

## 2023-06-22 PROCEDURE — 76816 OB US FOLLOW-UP PER FETUS: CPT | Performed by: OBSTETRICS & GYNECOLOGY

## 2023-06-22 NOTE — PATIENT INSTRUCTIONS
Thank you for choosing us for your  care today  If you have any questions about your ultrasound or care, please do not hesitate to contact us or your primary obstetrician  Some general instructions for your pregnancy are:    Exercise: Aim for 22 minutes per day (150 minutes per week) of regular exercise  Walking is great! Nutrition: Choose healthy sources of calcium, iron, and protein  Learn about Preeclampsia: preeclampsia is a common, serious high blood pressure complication in pregnancy  A blood pressure of [id number] (systolic or top number) or 84LIKS (diastolic or bottom number) is not normal and needs evaluation by your doctor  Aspirin is sometimes prescribed in early pregnancy to prevent preeclampsia in women with risk factors - ask your obstetrician if you should be on this medication  For more resources, visit:  [employer]  If you smoke, try to reduce how many cigarettes you smoke or try to quit completely  Do not vape  Other warning signs to watch out for in pregnancy or postpartum: chest pain, obstructed breathing or shortness of breath, seizures, thoughts of hurting yourself or your baby, bleeding, a painful or swollen leg, fever, or headache (see [facility] POST-BIRTH Warning Signs campaign)  If these happen call 911  Itching is also not normal in pregnancy and if you experience this, especially over your hands and feet, potentially worse at night, notify your doctors

## 2023-06-22 NOTE — PROGRESS NOTES
"[address]: Ms Mounika Colvin was seen today for fetal growth and followup missed anatomy ultrasound  See ultrasound report under \"OB Procedures\" tab  Please don't hesitate to contact our office with any concerns or questions    Sola Frey MD      "

## 2023-06-23 ENCOUNTER — DOCUMENTATION (OUTPATIENT)
Dept: OBGYN CLINIC | Facility: CLINIC | Age: 28
End: 2023-06-23

## 2023-06-28 ENCOUNTER — ROUTINE PRENATAL (OUTPATIENT)
Dept: OBGYN CLINIC | Facility: CLINIC | Age: 28
End: 2023-06-28

## 2023-06-28 VITALS — SYSTOLIC BLOOD PRESSURE: 110 MMHG | BODY MASS INDEX: 35.99 KG/M2 | WEIGHT: 223 LBS | DIASTOLIC BLOOD PRESSURE: 62 MMHG

## 2023-06-28 DIAGNOSIS — Z3A.32 32 WEEKS GESTATION OF PREGNANCY: ICD-10-CM

## 2023-06-28 DIAGNOSIS — O26.893 RH NEGATIVE STATUS DURING PREGNANCY IN THIRD TRIMESTER: Primary | ICD-10-CM

## 2023-06-28 DIAGNOSIS — Z67.91 RH NEGATIVE STATUS DURING PREGNANCY IN THIRD TRIMESTER: Primary | ICD-10-CM

## 2023-06-28 NOTE — PROGRESS NOTES
Patient is a 28 YO [id number] female presenting to the office at 32 6 weeks for routine OB care  BP: 110/62  TWlb  Fetal Movement: yes good movement  Feeling well today  Denies LOf   CTX, VB  Reviewed labor precautions  Call for concerns  RTO 2 weeks

## 2023-07-12 ENCOUNTER — ROUTINE PRENATAL (OUTPATIENT)
Dept: OBGYN CLINIC | Facility: CLINIC | Age: 28
End: 2023-07-12

## 2023-07-12 VITALS — WEIGHT: 224 LBS | DIASTOLIC BLOOD PRESSURE: 62 MMHG | BODY MASS INDEX: 36.15 KG/M2 | SYSTOLIC BLOOD PRESSURE: 112 MMHG

## 2023-07-12 DIAGNOSIS — Z3A.34 34 WEEKS GESTATION OF PREGNANCY: ICD-10-CM

## 2023-07-12 DIAGNOSIS — Z67.91 RH NEGATIVE STATUS DURING PREGNANCY IN THIRD TRIMESTER: Primary | ICD-10-CM

## 2023-07-12 DIAGNOSIS — O26.893 RH NEGATIVE STATUS DURING PREGNANCY IN THIRD TRIMESTER: Primary | ICD-10-CM

## 2023-07-12 PROCEDURE — PNV: Performed by: PHYSICIAN ASSISTANT

## 2023-07-12 RX ORDER — VALACYCLOVIR HYDROCHLORIDE 500 MG/1
500 TABLET, FILM COATED ORAL DAILY
Qty: 30 TABLET | Refills: 1 | Status: SHIPPED | OUTPATIENT
Start: 2023-07-12 | End: 2023-08-11

## 2023-07-12 NOTE — PROGRESS NOTES
Patient is a 30 YO [id number] female presenting to the office at 34.6 weeks for routine OB care.    BP: 112/62  TWlb  Fetal Movement: yes good movement  Feeling well today  Denies LOF, CTX, VB  No f/u MFM scans  Reviewed precautions  Call for concerns  RTO 2 weeks

## 2023-07-12 NOTE — PROGRESS NOTES
Pt is here for her 34w prenatal. Pt denies any swelling,leakage,bleeding,pressure, and contractions. Pt doing well no concerns.  Urine dip is neg/neg

## 2023-07-26 ENCOUNTER — ROUTINE PRENATAL (OUTPATIENT)
Dept: OBGYN CLINIC | Facility: CLINIC | Age: 28
End: 2023-07-26

## 2023-07-26 VITALS — BODY MASS INDEX: 36.48 KG/M2 | WEIGHT: 226 LBS | SYSTOLIC BLOOD PRESSURE: 116 MMHG | DIASTOLIC BLOOD PRESSURE: 58 MMHG

## 2023-07-26 DIAGNOSIS — Z3A.36 36 WEEKS GESTATION OF PREGNANCY: ICD-10-CM

## 2023-07-26 DIAGNOSIS — O34.219 HISTORY OF CESAREAN DELIVERY, ANTEPARTUM: Primary | ICD-10-CM

## 2023-07-26 DIAGNOSIS — O09.899 HISTORY OF PRETERM DELIVERY, CURRENTLY PREGNANT: ICD-10-CM

## 2023-07-26 DIAGNOSIS — O09.899 SHORT INTERVAL BETWEEN PREGNANCIES AFFECTING PREGNANCY, ANTEPARTUM: ICD-10-CM

## 2023-07-26 PROCEDURE — PNV: Performed by: OBSTETRICS & GYNECOLOGY

## 2023-07-26 NOTE — PROGRESS NOTES
29 y.o. [id number] female at 40w7d (Estimated Date of Delivery: 23) for PNV. Pre- Vitals    Flowsheet Row Most Recent Value   Prenatal Assessment    Fetal Heart Rate 140   Movement Present   Presentation Vertex   Prenatal Vitals    Blood Pressure 116/58   Weight - Scale 103 kg (226 lb)   Urine Albumin/Glucose    Dilation/Effacement/Station    Cervical Dilation 1   Cervical Effacement 0   Fetal Station -3   Vaginal Drainage    Edema         TW.1 kg (31 lb)    Leakage of fluid: no  Vaginal bleeding: no  Contractions/Cramping: no  Fetal movement: yes    GBS done: Yes  PCN allergy: No  Chlamydia/gonorrhea swab done: Yes  Delivery plan: Would like to await spontaneous labor. Okay with induction of labor after her due date. Desires membrane sweeping at 38 and 39 weeks in order to encourage spontaneous labor. Labor precautions given. [address] reviewed. RTO in 1 weeks.

## 2023-07-26 NOTE — PROGRESS NOTES
Routine prenatal 36 weeks GBS and G/C. Pt is well, states there are no concerns. Denies vb,lof, and ctx.

## 2023-07-26 NOTE — PATIENT INSTRUCTIONS
Am I in labour? As you enter the final stages of your pregnancy, your body will give signs that labour is approaching. The following information should help you to understand these signs and make it easier for you to determine whether you are in labour. Some of the signs and symptoms of going into labour may include:    period-like cramps  backache  diarrhoea  mucous discharge or ‘show’  gush or trickle of water as the membranes break  contractions  Engagement  As you move closer to delivery, your baby’s head may drop and become engaged in your pelvis in preparation for labour. If you are expecting your first baby, you may notice pressure in your groin and on your bladder beginning up to four weeks before the birth. You may also notice that you can breathe a little easier and have a little more appetite as your baby drops, and is not pushed up against your diaphragm and stomach quite so much. This is sometimes known as “lightening”, as women generally feel lighter. Show    During your pregnancy a mucous plug fills your cervix. Towards the end of pregnancy, the cervix becomes softer and this mucous plug may become loosened and start to come away. The process of discharging this mucous is called a ‘show’ and might often contain streaks of blood or may also be brownish in colour. This is different from any flow of fresh blood which you would report immediately to your doctor or the hospital. The show may continue over a period of hours or even days. It is one of the signs that your body is preparing for birth. Labour may begin in the next few days, hours or weeks following a show. There is no need to phone the hospital if you have had a show. Water breaking (rupture of membranes)    This may occur at any time prior to the start of labour, or at any time during labour. The break may be low, near the opening of the uterus, and will produce a gush of amniotic fluid.  If this occurs, place a sanitary pad on and note the colour of the fluid. Ring the hospital and tell the midwife that this has occurred. You will usually be asked to come in to the hospital.    Another type of amniotic fluid leak may occur higher up in the amniotic sack, or top of the uterus. This will be less obvious to you and you may only notice a trickle of fluid. Since many women have a heavy vaginal discharge or leak a small amount of urine towards the end of their pregnancy and it is often difficult to tell the difference between urine and amniotic fluid - urine is often yellow; where amniotic fluid is usually clear, or has a pink tinge, and has a "sweet" odour. If you are unsure, ring the hospital.    If the colour of the fluid is green or brownish, it indicates that your baby has passed a bowel motion (meconium) inside the uterus. It is very common to have meconium-stained amniotic fluid in a pregnancy over 41 weeks, but this may also be a sign that your baby is distressed. You will need to ring the hospital immediately and then come into the hospital.    Contractions    Oren Reveles contractions  [place] Reveles contractions are sometimes mistaken for labour. These “practice” contractions usually start MCFP through the pregnancy and continue right through to the end. These contractions are often irregular and can be uncomfortable and tight. [place] Reveles contractions usually increase in regularity and strength towards the end of your pregnancy, preparing your uterus for the birth. Sometimes it is difficult to distinguish between these [facility] contractions and labour contractions. Below are the common differences between the two. Labour contractions  True labour contractions usually increase in strength and duration. In order to time your contractions, time the interval between the start of one contraction to the start of the next. Early labour contractions are often likened to period cramps with or without backache.     [facility] contractions    Labour contractions    usually irregular and short    become regular with time    do not get closer together    get closer together with time    do not get stronger     become stronger    walking does not make them stronger  walking can make them stronger    lying down may make them go away   lying down does not make them go away    uncomfortable and tight--not painful   Painful - can't walk, talk or breathe through them        How does labour start? Labour can start in different ways. You may be start experiencing some period like pains or contractions. You might notice that these tightenings/contractions start to get stronger, closer and last longer than before. Or you might start with some back ache or a stomach upset that gets stronger and develops into regular contractions. In approximately 10-15% of women, labour will start when your membranes rupture. Contractions usually follow. Should I call my doctor?     You should call your doctor at [phone] when:    - your gregg break  - you have bright blood loss  - your contractions are regular and five minutes apart  - if you have decreased fetal movement

## 2023-08-01 ENCOUNTER — ROUTINE PRENATAL (OUTPATIENT)
Dept: OBGYN CLINIC | Facility: CLINIC | Age: 28
End: 2023-08-01

## 2023-08-01 VITALS — DIASTOLIC BLOOD PRESSURE: 70 MMHG | WEIGHT: 227.8 LBS | BODY MASS INDEX: 36.77 KG/M2 | SYSTOLIC BLOOD PRESSURE: 120 MMHG

## 2023-08-01 DIAGNOSIS — O34.219 PATIENT DESIRES VAGINAL BIRTH AFTER CESAREAN SECTION (VBAC): Primary | ICD-10-CM

## 2023-08-01 DIAGNOSIS — Z3A.37 37 WEEKS GESTATION OF PREGNANCY: ICD-10-CM

## 2023-08-01 DIAGNOSIS — O34.219 HISTORY OF CESAREAN DELIVERY, ANTEPARTUM: ICD-10-CM

## 2023-08-01 DIAGNOSIS — O09.899 HISTORY OF PRETERM DELIVERY, CURRENTLY PREGNANT: ICD-10-CM

## 2023-08-01 DIAGNOSIS — O09.899 SHORT INTERVAL BETWEEN PREGNANCIES AFFECTING PREGNANCY, ANTEPARTUM: ICD-10-CM

## 2023-08-01 PROCEDURE — PNV: Performed by: OBSTETRICS & GYNECOLOGY

## 2023-08-01 NOTE — PATIENT INSTRUCTIONS
Am I in labour? As you enter the final stages of your pregnancy, your body will give signs that labour is approaching. The following information should help you to understand these signs and make it easier for you to determine whether you are in labour. Some of the signs and symptoms of going into labour may include:    period-like cramps  backache  diarrhoea  mucous discharge or ‘show’  gush or trickle of water as the membranes break  contractions  Engagement  As you move closer to delivery, your baby’s head may drop and become engaged in your pelvis in preparation for labour. If you are expecting your first baby, you may notice pressure in your groin and on your bladder beginning up to four weeks before the birth. You may also notice that you can breathe a little easier and have a little more appetite as your baby drops, and is not pushed up against your diaphragm and stomach quite so much. This is sometimes known as “lightening”, as women generally feel lighter. Show    During your pregnancy a mucous plug fills your cervix. Towards the end of pregnancy, the cervix becomes softer and this mucous plug may become loosened and start to come away. The process of discharging this mucous is called a ‘show’ and might often contain streaks of blood or may also be brownish in colour. This is different from any flow of fresh blood which you would report immediately to your doctor or the hospital. The show may continue over a period of hours or even days. It is one of the signs that your body is preparing for birth. Labour may begin in the next few days, hours or weeks following a show. There is no need to phone the hospital if you have had a show. Water breaking (rupture of membranes)    This may occur at any time prior to the start of labour, or at any time during labour. The break may be low, near the opening of the uterus, and will produce a gush of amniotic fluid.  If this occurs, place a sanitary pad on and note the colour of the fluid. Ring the hospital and tell the midwife that this has occurred. You will usually be asked to come in to the hospital.    Another type of amniotic fluid leak may occur higher up in the amniotic sack, or top of the uterus. This will be less obvious to you and you may only notice a trickle of fluid. Since many women have a heavy vaginal discharge or leak a small amount of urine towards the end of their pregnancy and it is often difficult to tell the difference between urine and amniotic fluid - urine is often yellow; where amniotic fluid is usually clear, or has a pink tinge, and has a "sweet" odour. If you are unsure, ring the hospital.    If the colour of the fluid is green or brownish, it indicates that your baby has passed a bowel motion (meconium) inside the uterus. It is very common to have meconium-stained amniotic fluid in a pregnancy over 41 weeks, but this may also be a sign that your baby is distressed. You will need to ring the hospital immediately and then come into the hospital.    Contractions    Oren Reveles contractions  [place] Reveles contractions are sometimes mistaken for labour. These “practice” contractions usually start FPC through the pregnancy and continue right through to the end. These contractions are often irregular and can be uncomfortable and tight. [place] Reveles contractions usually increase in regularity and strength towards the end of your pregnancy, preparing your uterus for the birth. Sometimes it is difficult to distinguish between these [facility] contractions and labour contractions. Below are the common differences between the two. Labour contractions  True labour contractions usually increase in strength and duration. In order to time your contractions, time the interval between the start of one contraction to the start of the next. Early labour contractions are often likened to period cramps with or without backache.     [facility] contractions    Labour contractions    usually irregular and short    become regular with time    do not get closer together    get closer together with time    do not get stronger     become stronger    walking does not make them stronger  walking can make them stronger    lying down may make them go away   lying down does not make them go away    uncomfortable and tight--not painful   Painful - can't walk, talk or breathe through them        How does labour start? Labour can start in different ways. You may be start experiencing some period like pains or contractions. You might notice that these tightenings/contractions start to get stronger, closer and last longer than before. Or you might start with some back ache or a stomach upset that gets stronger and develops into regular contractions. In approximately 10-15% of women, labour will start when your membranes rupture. Contractions usually follow. Should I call my doctor?     You should call your doctor at [phone] when:    - your gregg break  - you have bright blood loss  - your contractions are regular and five minutes apart  - if you have decreased fetal movement

## 2023-08-07 PROBLEM — O99.213 OBESITY AFFECTING PREGNANCY IN THIRD TRIMESTER: Status: ACTIVE | Noted: 2023-04-12

## 2023-08-07 PROBLEM — Z3A.38 38 WEEKS GESTATION OF PREGNANCY: Status: ACTIVE | Noted: 2023-06-13

## 2023-08-08 ENCOUNTER — ROUTINE PRENATAL (OUTPATIENT)
Dept: OBGYN CLINIC | Facility: CLINIC | Age: 28
End: 2023-08-08
Payer: COMMERCIAL

## 2023-08-08 VITALS — SYSTOLIC BLOOD PRESSURE: 118 MMHG | WEIGHT: 228 LBS | BODY MASS INDEX: 36.8 KG/M2 | DIASTOLIC BLOOD PRESSURE: 70 MMHG

## 2023-08-08 DIAGNOSIS — Z3A.38 38 WEEKS GESTATION OF PREGNANCY: ICD-10-CM

## 2023-08-08 DIAGNOSIS — O34.219 PATIENT DESIRES VAGINAL BIRTH AFTER CESAREAN SECTION (VBAC): Primary | ICD-10-CM

## 2023-08-08 PROCEDURE — PNV: Performed by: OBSTETRICS & GYNECOLOGY

## 2023-08-08 PROCEDURE — 59025 FETAL NON-STRESS TEST: CPT | Performed by: OBSTETRICS & GYNECOLOGY

## 2023-08-08 NOTE — PROGRESS NOTES
29 y.o. [id number]  female at 44.7 wga for PNV. BP : 118/70.  TW  Irreg contractions, no LOF or VB, good FM  SVE 1-2/-2  Prefers to wait until [facility] before IOL  NST - difficult to tell baseline with doppler - 130's - reactive  F/u 1 week

## 2023-08-08 NOTE — PROGRESS NOTES
Undressed for cervical check. Patient is experiencing some BH contractions but denies any fluid leaking or pelvic pressure. Urine dip neg/neg.

## 2023-08-09 ENCOUNTER — TELEPHONE (OUTPATIENT)
Dept: OBGYN CLINIC | Facility: CLINIC | Age: 28
End: 2023-08-09

## 2023-08-09 NOTE — TELEPHONE ENCOUNTER
Pt scheduled a tentative induction date for next week but wants to know if she can change it to this weekend with Dr. Susana Forrest. Can you see if there are any openings?

## 2023-08-11 ENCOUNTER — ANESTHESIA (INPATIENT)
Dept: ANESTHESIOLOGY | Facility: HOSPITAL | Age: 28
End: 2023-08-11
Payer: COMMERCIAL

## 2023-08-11 ENCOUNTER — HOSPITAL ENCOUNTER (INPATIENT)
Facility: HOSPITAL | Age: 28
LOS: 2 days | Discharge: HOME | End: 2023-08-13
Attending: OBSTETRICS & GYNECOLOGY | Admitting: OBSTETRICS & GYNECOLOGY
Payer: COMMERCIAL

## 2023-08-11 ENCOUNTER — ANESTHESIA EVENT (INPATIENT)
Dept: ANESTHESIOLOGY | Facility: HOSPITAL | Age: 28
End: 2023-08-11
Payer: COMMERCIAL

## 2023-08-11 ENCOUNTER — HOSPITAL ENCOUNTER (OUTPATIENT)
Dept: LABOR AND DELIVERY | Facility: HOSPITAL | Age: 28
Discharge: HOME | End: 2023-08-11
Payer: COMMERCIAL

## 2023-08-11 DIAGNOSIS — O34.219 VBAC, DELIVERED: Primary | ICD-10-CM

## 2023-08-11 PROBLEM — Z67.91 RH NEGATIVE STATE IN ANTEPARTUM PERIOD: Status: ACTIVE | Noted: 2023-08-11

## 2023-08-11 PROBLEM — O26.899 RH NEGATIVE STATE IN ANTEPARTUM PERIOD: Status: ACTIVE | Noted: 2023-08-11

## 2023-08-11 PROBLEM — Z34.90 ENCOUNTER FOR INDUCTION OF LABOR: Status: ACTIVE | Noted: 2023-08-11

## 2023-08-11 LAB
ABO GROUP BLD: NORMAL
BASE EXCESS BLDCOA CALC-SCNC: -7.9 MMOL/L (ref 3–11)
BASE EXCESS BLDCOV CALC-SCNC: -4.3 MMOL/L (ref 1–9)
BLD GP AB SCN SERPL QL: POSITIVE
BLOOD GROUP ANTIBODIES SERPL: NORMAL
ERYTHROCYTE [DISTWIDTH] IN BLOOD BY AUTOMATED COUNT: 13.4 % (ref 11.6–15.1)
HBV SURFACE AG SER QL: NORMAL
HCO3 BLDCOA-SCNC: 19.7 MMOL/L (ref 17.3–27.3)
HCO3 BLDCOV-SCNC: 22.4 MMOL/L (ref 12.2–28.6)
HCT VFR BLD AUTO: 34.4 % (ref 34.8–46.1)
HGB BLD-MCNC: 11.4 G/DL (ref 11.5–15.4)
HOLD SPECIMEN: NORMAL
MCH RBC QN AUTO: 29.1 PG (ref 26.8–34.3)
MCHC RBC AUTO-ENTMCNC: 33.1 G/DL (ref 31.4–37.4)
MCV RBC AUTO: 88 FL (ref 82–98)
O2 CT VFR BLDCOA CALC: 17.1 ML/DL
OXYHGB MFR BLDCOA: 78.1 %
OXYHGB MFR BLDCOV: 75.2 %
PCO2 BLDCOA: 48.2 MM[HG] (ref 30–60)
PCO2 BLDCOV: 47.3 MM HG (ref 27–43)
PH BLDCOA: 7.23 [PH] (ref 7.23–7.43)
PH BLDCOV: 7.29 [PH] (ref 7.19–7.49)
PLATELET # BLD AUTO: 174 THOUSANDS/UL (ref 149–390)
PMV BLD AUTO: 10.2 FL (ref 8.9–12.7)
PO2 BLDCOA: 38.4 MM HG (ref 5–25)
PO2 BLDCOV: 33.7 MM HG (ref 15–45)
RBC # BLD AUTO: 3.92 MILLION/UL (ref 3.81–5.12)
RH BLD: NEGATIVE
SAO2 % BLDCOV: 15.8 ML/DL
SPECIMEN EXPIRATION DATE: NORMAL
TREPONEMA PALLIDUM IGG+IGM AB [PRESENCE] IN SERUM OR PLASMA BY IMMUNOASSAY: NORMAL
WBC # BLD AUTO: 9.55 THOUSAND/UL (ref 4.31–10.16)

## 2023-08-11 PROCEDURE — 86901 BLOOD TYPING SEROLOGIC RH(D): CPT

## 2023-08-11 PROCEDURE — 86900 BLOOD TYPING SEROLOGIC ABO: CPT

## 2023-08-11 PROCEDURE — 86780 TREPONEMA PALLIDUM: CPT

## 2023-08-11 PROCEDURE — 82805 BLOOD GASES W/O2 SATURATION: CPT | Performed by: OBSTETRICS & GYNECOLOGY

## 2023-08-11 PROCEDURE — 87340 HEPATITIS B SURFACE AG IA: CPT

## 2023-08-11 PROCEDURE — 86870 RBC ANTIBODY IDENTIFICATION: CPT

## 2023-08-11 PROCEDURE — NC001 PR NO CHARGE: Performed by: OBSTETRICS & GYNECOLOGY

## 2023-08-11 PROCEDURE — 10907ZC DRAINAGE OF AMNIOTIC FLUID, THERAPEUTIC FROM PRODUCTS OF CONCEPTION, VIA NATURAL OR ARTIFICIAL OPENING: ICD-10-PCS | Performed by: OBSTETRICS & GYNECOLOGY

## 2023-08-11 PROCEDURE — 85027 COMPLETE CBC AUTOMATED: CPT

## 2023-08-11 PROCEDURE — 86850 RBC ANTIBODY SCREEN: CPT

## 2023-08-11 RX ORDER — DIPHENHYDRAMINE HCL 25 MG
25 TABLET ORAL EVERY 6 HOURS PRN
Status: DISCONTINUED | OUTPATIENT
Start: 2023-08-11 | End: 2023-08-13 | Stop reason: HOSPADM

## 2023-08-11 RX ORDER — IBUPROFEN 600 MG/1
600 TABLET ORAL EVERY 6 HOURS
Status: DISCONTINUED | OUTPATIENT
Start: 2023-08-12 | End: 2023-08-13 | Stop reason: HOSPADM

## 2023-08-11 RX ORDER — OXYTOCIN/RINGER'S LACTATE 30/500 ML
1-30 PLASTIC BAG, INJECTION (ML) INTRAVENOUS
Status: DISCONTINUED | OUTPATIENT
Start: 2023-08-11 | End: 2023-08-11

## 2023-08-11 RX ORDER — CALCIUM CARBONATE 500 MG/1
500 TABLET, CHEWABLE ORAL DAILY PRN
Status: DISCONTINUED | OUTPATIENT
Start: 2023-08-11 | End: 2023-08-11

## 2023-08-11 RX ORDER — VALACYCLOVIR HYDROCHLORIDE 500 MG/1
500 TABLET, FILM COATED ORAL DAILY
Status: DISCONTINUED | OUTPATIENT
Start: 2023-08-11 | End: 2023-08-13 | Stop reason: HOSPADM

## 2023-08-11 RX ORDER — SIMETHICONE 80 MG
80 TABLET,CHEWABLE ORAL 4 TIMES DAILY PRN
Status: DISCONTINUED | OUTPATIENT
Start: 2023-08-11 | End: 2023-08-13 | Stop reason: HOSPADM

## 2023-08-11 RX ORDER — SENNOSIDES 8.6 MG
1 TABLET ORAL DAILY
Status: DISCONTINUED | OUTPATIENT
Start: 2023-08-12 | End: 2023-08-13 | Stop reason: HOSPADM

## 2023-08-11 RX ORDER — SODIUM CHLORIDE, SODIUM LACTATE, POTASSIUM CHLORIDE, CALCIUM CHLORIDE 600; 310; 30; 20 MG/100ML; MG/100ML; MG/100ML; MG/100ML
125 INJECTION, SOLUTION INTRAVENOUS CONTINUOUS
Status: DISCONTINUED | OUTPATIENT
Start: 2023-08-11 | End: 2023-08-11

## 2023-08-11 RX ORDER — ROPIVACAINE HYDROCHLORIDE 2 MG/ML
INJECTION, SOLUTION EPIDURAL; INFILTRATION; PERINEURAL AS NEEDED
Status: DISCONTINUED | OUTPATIENT
Start: 2023-08-11 | End: 2023-08-12 | Stop reason: HOSPADM

## 2023-08-11 RX ORDER — ONDANSETRON 2 MG/ML
4 INJECTION INTRAMUSCULAR; INTRAVENOUS EVERY 6 HOURS PRN
Status: DISCONTINUED | OUTPATIENT
Start: 2023-08-11 | End: 2023-08-11

## 2023-08-11 RX ORDER — OXYTOCIN/RINGER'S LACTATE 30/500 ML
250 PLASTIC BAG, INJECTION (ML) INTRAVENOUS ONCE
Status: COMPLETED | OUTPATIENT
Start: 2023-08-11 | End: 2023-08-12

## 2023-08-11 RX ORDER — ONDANSETRON 2 MG/ML
4 INJECTION INTRAMUSCULAR; INTRAVENOUS EVERY 8 HOURS PRN
Status: DISCONTINUED | OUTPATIENT
Start: 2023-08-11 | End: 2023-08-13 | Stop reason: HOSPADM

## 2023-08-11 RX ORDER — DIAPER,BRIEF,INFANT-TODD,DISP
1 EACH MISCELLANEOUS DAILY PRN
Status: DISCONTINUED | OUTPATIENT
Start: 2023-08-11 | End: 2023-08-13 | Stop reason: HOSPADM

## 2023-08-11 RX ORDER — LIDOCAINE HYDROCHLORIDE AND EPINEPHRINE 15; 5 MG/ML; UG/ML
INJECTION, SOLUTION EPIDURAL AS NEEDED
Status: DISCONTINUED | OUTPATIENT
Start: 2023-08-11 | End: 2023-08-12 | Stop reason: HOSPADM

## 2023-08-11 RX ORDER — CALCIUM CARBONATE 500 MG/1
1000 TABLET, CHEWABLE ORAL DAILY PRN
Status: DISCONTINUED | OUTPATIENT
Start: 2023-08-11 | End: 2023-08-13 | Stop reason: HOSPADM

## 2023-08-11 RX ORDER — ACETAMINOPHEN 325 MG/1
650 TABLET ORAL EVERY 4 HOURS PRN
Status: DISCONTINUED | OUTPATIENT
Start: 2023-08-11 | End: 2023-08-13 | Stop reason: HOSPADM

## 2023-08-11 RX ADMIN — SODIUM CHLORIDE 2.5 MILLION UNITS: 9 INJECTION, SOLUTION INTRAVENOUS at 15:00

## 2023-08-11 RX ADMIN — ROPIVACAINE HYDROCHLORIDE 5 ML: 2 INJECTION, SOLUTION EPIDURAL; INFILTRATION at 13:18

## 2023-08-11 RX ADMIN — Medication 2 MILLI-UNITS/MIN: at 07:25

## 2023-08-11 RX ADMIN — SODIUM CHLORIDE, SODIUM LACTATE, POTASSIUM CHLORIDE, AND CALCIUM CHLORIDE 125 ML/HR: .6; .31; .03; .02 INJECTION, SOLUTION INTRAVENOUS at 19:18

## 2023-08-11 RX ADMIN — SODIUM CHLORIDE 2.5 MILLION UNITS: 9 INJECTION, SOLUTION INTRAVENOUS at 11:00

## 2023-08-11 RX ADMIN — ROPIVACAINE HYDROCHLORIDE: 2 INJECTION, SOLUTION EPIDURAL; INFILTRATION at 20:20

## 2023-08-11 RX ADMIN — SODIUM CHLORIDE 5 MILLION UNITS: 0.9 INJECTION, SOLUTION INTRAVENOUS at 06:59

## 2023-08-11 RX ADMIN — Medication 250 MILLI-UNITS/MIN: at 23:15

## 2023-08-11 RX ADMIN — LIDOCAINE HYDROCHLORIDE AND EPINEPHRINE 5 ML: 15; 5 INJECTION, SOLUTION EPIDURAL at 13:18

## 2023-08-11 RX ADMIN — SODIUM CHLORIDE 2.5 MILLION UNITS: 9 INJECTION, SOLUTION INTRAVENOUS at 19:14

## 2023-08-11 RX ADMIN — SODIUM CHLORIDE, SODIUM LACTATE, POTASSIUM CHLORIDE, AND CALCIUM CHLORIDE 125 ML/HR: .6; .31; .03; .02 INJECTION, SOLUTION INTRAVENOUS at 06:59

## 2023-08-11 RX ADMIN — ROPIVACAINE HYDROCHLORIDE: 2 INJECTION, SOLUTION EPIDURAL; INFILTRATION at 13:30

## 2023-08-11 RX ADMIN — ONDANSETRON 4 MG: 2 INJECTION INTRAMUSCULAR; INTRAVENOUS at 21:51

## 2023-08-11 RX ADMIN — SODIUM CHLORIDE, SODIUM LACTATE, POTASSIUM CHLORIDE, AND CALCIUM CHLORIDE 125 ML/HR: .6; .31; .03; .02 INJECTION, SOLUTION INTRAVENOUS at 13:28

## 2023-08-11 NOTE — OB LABOR/OXYTOCIN SAFETY PROGRESS
Oxytocin Safety Progress Check Note - Jan Rodriguez 29 y.o. female MRN: [id number]    Unit/Bed#: -01 Encounter: [id number]    Dose (thania-units/min) Oxytocin: 18 thania-units/min  Contraction Frequency (minutes): 2-3  Contraction Quality: Mild, Moderate  Tachysystole: No   Cervical Dilation: 2-3        Cervical Effacement: 50  Fetal Station: -2  Baseline Rate: 135 bpm  Fetal Heart Rate: 150 BPM  FHR Category: Category I               Vital Signs:   Vitals:    08/11/23 1206   BP: 118/68   Pulse: 82   Resp:    Temp:        Notes/comments: SVE as above. Patient wants to get epidural and then will AROM. Continue pitocin titration. Cat 1 tracing.          Ricki Bower DO 8/11/2023 12:54 PM

## 2023-08-11 NOTE — ANESTHESIA PREPROCEDURE EVALUATION
Procedure:  LABOR ANALGESIA    Relevant Problems   ANESTHESIA   (+) PONV (postoperative nausea and vomiting)      GYN   (+) 38 weeks gestation of pregnancy        Physical Exam    Airway    Mallampati score: II  TM Distance: >3 FB  Neck ROM: full     Dental       Cardiovascular      Pulmonary      Other Findings        Anesthesia Plan  ASA Score- 2     Anesthesia Type- epidural with ASA Monitors. Additional Monitors:   Airway Plan:           Plan Factors-    Chart reviewed. Existing labs reviewed. Patient summary reviewed. Patient is not a current smoker. Patient did not smoke on day of surgery. Induction-     Postoperative Plan-     Informed Consent- Anesthetic plan and risks discussed with patient. I personally reviewed this patient with the CRNA. Discussed and agreed on the Anesthesia Plan with the CRNA. Hosea Hubbard

## 2023-08-11 NOTE — OB LABOR/OXYTOCIN SAFETY PROGRESS
Oxytocin Safety Progress Check Note - Jan Rodriguez 29 y.o. female MRN: [id number]    Unit/Bed#: -01 Encounter: [id number]    Dose (thania-units/min) Oxytocin: 28 thania-units/min  Contraction Frequency (minutes): 3  Contraction Quality: Moderate  Tachysystole: No   Cervical Dilation: 4-5        Cervical Effacement: 50  Fetal Station: -2  Baseline Rate: 135 bpm  Fetal Heart Rate: 139 BPM  FHR Category: Category I  Oxytocin Safety Progress Check: Safety check completed            Vital Signs:   Vitals:    08/11/23 1743   BP: 120/63   Pulse: 87   Resp:    Temp:    SpO2:        Notes/comments:   Forebag ruptured.   Continue pitocin titration     Pema Lin MD 8/11/2023 5:48 PM

## 2023-08-11 NOTE — ANESTHESIA PROCEDURE NOTES
Epidural Block    Patient location during procedure: OB  Start time: 8/11/2023 1:18 PM  Staffing  Performed by:  Anthony Rawls CRNA  Authorized by: Angus Rogers MD    Preanesthetic Checklist  Completed: patient identified, IV checked, site marked, risks and benefits discussed, surgical consent, monitors and equipment checked, pre-op evaluation and timeout performed  Epidural  Patient position: sitting  Prep: ChloraPrep  Patient monitoring: continuous pulse ox and frequent blood pressure checks  Approach: midline  Location: lumbar  Injection technique: MARLYN saline  Needle  Needle type: Tuohy   Needle gauge: 18 G  Catheter type: side hole  Catheter size: 20 G  Catheter at skin depth: 12 cm  Catheter securement method: clear occlusive dressing  Test dose: negative  Assessment  Number of attempts: 1negative aspiration for CSF, negative aspiration for heme and no paresthesia on injection  patient tolerated the procedure well with no immediate complications

## 2023-08-11 NOTE — ASSESSMENT & PLAN NOTE
Valtrex ordered   Patient reports no active lesions in years and adamantly takes her valtrex.  No lesions on exam

## 2023-08-11 NOTE — H&P
H&P - Obstetrics   Dean Keller 29 y.o. female MRN: [id number]  Unit/Bed#:  Encounter: [id number]    Assessment and Plan:  29 y.o. N6P1921 at 39w1d who is being admitted for eIOL at term. By issue:  * Encounter for induction of labor  Assessment & Plan  SVE 4.6/37/-3  Cephalic by ultrasound. Clinical EFW by [facility]: 7-8lbs  GBS positive status, penicillin ordered  Plan for pitocin induction  Analgesia and/or epidural at patient request  Follow up CBC, Syphilis screening, blood type & antibody screen      Rh negative state in antepartum period  Assessment & Plan  Received Rhogam 23  Rhogam panel postpartum     HSV infection  Assessment & Plan  Valtrex ordered   Patient reports no active lesions in years and adamantly takes her valtrex. No lesions on exam      Plan of care discussed with Dr. Ainsley Cowden. This patient will be an INPATIENT and I certify the anticipated length of stay is >2 Midnights. History of Present Illness     Chief Complaint: "here for induction"    History of Present Illness:  Dean Keller is a 29 y.o. [id number] with an FRANCES of 2023, by Ultrasound at 39w1d weeks gestation who presents for eIOL. Denies headaches, chest pain, SOB, abdominal pain, nausea, vomiting, diarrhea, constipation, dysuria, hematuria. Contractions: Occasional  Loss of fluid: Denies  Vaginal bleeding: denies  Fetal movement: present    ROS otherwise negative. Obstetrician: [facility]    Pregnancy complications:   1. Hx of postpartum hemorrhage     OB History    Para Term  AB Living   5 2 1 1 2 3   SAB IAB Ectopic Multiple Live Births   2 0 0 1 3      # Outcome Date GA Lbr Agusto/2nd Weight Sex Delivery Anes PTL Lv   5 Current            4 Term 22 39w4d / 00:35 3390 g (7 lb 7.6 oz) M Vag-Spont EPI N SIL      Complications:  Other Excessive Bleeding   3 SAB 2021           2A  20 36w3d  2660 g (5 lb 13.8 oz) M CS-LTranv Spinal Y SIL   2B  20 36w3d 2235 g (4 lb 14.8 oz) M CS-LTranv Spinal Y SIL   1 SAB 2019 5w0d    SAB         Complications: Chemical pregnancy      Obstetric Comments   Menarche 15       Baby complications/comments: None    Review of Systems  12-point ROS negative unless stated in HPI. Historical Information   Past Medical History:   Diagnosis Date   • Disease of thyroid gland     postpartum thyroiditis. Hyper after delivery of twins   • Female infertility    • Herpes        • PONV (postoperative nausea and vomiting)    • Pregnant 2022    Currently 5 weeks pregnant   • Varicella     had chickenpox as a child     Past Surgical History:   Procedure Laterality Date   •  SECTION      primary for twins, B was breech   • OVARIAN CYST REMOVAL Left     dermoid cyst removed, pt still has ovary   • [place] TX MISSED  FIRST TRIMESTER SURGICAL N/A 2021    Procedure: DILATATION AND EVACUATION (D&E) (8 WEEKS);   Surgeon: Catherine Ocampo MD;  Location: [facility]  MAIN [facility];  Service: Gynecology   • SKIN BIOPSY     • TONSILLECTOMY     • [address] EXTRACTION  2012     Social History   Social History     Substance and Sexual Activity   Alcohol Use No     Social History     Substance and Sexual Activity   Drug Use Never     Social History     Tobacco Use   Smoking Status Never   Smokeless Tobacco Never     Family History:   Family History   Problem Relation Age of Onset   • No Known Problems Mother    • No Known Problems Father    • No Known Problems Sister    • Melanoma Maternal Grandmother    • COPD Maternal Grandmother    • Heart disease Maternal Grandfather    • Heart attack Maternal Grandfather    • Ovarian cancer Paternal Grandmother 48   • No Known Problems Paternal Grandfather    • No Known Problems Son    • No Known Problems Son    • No Known Problems Son    • No Known Problems Maternal Aunt    • No Known Problems Maternal Aunt    • No Known Problems Paternal Aunt        Meds/Allergies      Medications Prior to Admission   Medication   • cholecalciferol (VITAMIN D3) 1,000 units tablet   • magnesium gluconate (MAGONATE) 500 mg tablet   • Prenatal Vit-Fe Fumarate-FA (BL PRENATAL VITAMINS PO)   • Probiotic Product (PROBIOTIC-10 PO)   • valACYclovir (VALTREX) 500 mg tablet      No Known Allergies    Objective:  Vitals: Last menstrual period 10/22/2022, not currently breastfeeding. There is no height or weight on file to calculate BMI.     Physical Exam  GEN: alert and oriented x 3, no apparent distress, appears well  CARDIAC: regular rate and rhythm  PULMONARY: normal effort, clear to auscultation bilaterally  ABDOMEN: gravid, soft, no tenderness  GENITOURINARY: normal appearing external female genitalia   EXTREMITIES: nontender, no edema  FETAL ASSESSMENT:  Fetal heart rate: 140/Moderate variability/15x15 accelerations/no decelerations; Category I  [place]: irritable, irregular contractions    Prenatal Labs:   Blood type: O-  Antibody screen: negative  HIV: non-reactive  Hepatitis B surface antigen: non-reactive  Hepatitis C antibody: non-reactive  Syphilis screening: negative  Gonorrhea/Chlamydia: negative/negative  Rubella: Immune  Varicella: Immune  Urine culture: No growth  1 hour GTT: 92 mg/dL  Group B strep: positive  Last Hemoglobin: 9.4g/dL  Last Platelet count: 965J    Invasive Devices     Intrauterine Pressure Catheter  Duration           Intrauterine Pressure Catheter 03/31/22 2200 497 days                Jf Marcelino MD   PGY-I, OBGYN  8/11/2023  6:52 AM

## 2023-08-11 NOTE — OB LABOR/OXYTOCIN SAFETY PROGRESS
Oxytocin Safety Progress Check Note - Jan Rodriguez 29 y.o. female MRN: [id number]    Unit/Bed#: -01 Encounter: [id number]    Dose (thania-units/min) Oxytocin: 14 thania-units/min  Contraction Frequency (minutes): 2.5-4  Contraction Quality: Mild  Tachysystole: No   Cervical Dilation: 2-3        Cervical Effacement: 50  Fetal Station: -2  Baseline Rate: 140 bpm  Fetal Heart Rate: 150 BPM  FHR Category: Category I               Vital Signs:   Vitals:    08/11/23 1014   BP: 138/78   Pulse: (!) 113   Resp:    Temp:        Notes/comments: SVE as above. Cat 1 tracing.  Agreeable to [facility] at next check      Serena Joseph DO 8/11/2023 10:15 AM

## 2023-08-12 LAB
ABO GROUP BLD: NORMAL
BLD GP AB SCN SERPL QL: POSITIVE
FETAL CELL SCN BLD QL ROSETTE: NEGATIVE
RH BLD: NEGATIVE

## 2023-08-12 PROCEDURE — 99024 POSTOP FOLLOW-UP VISIT: CPT | Performed by: OBSTETRICS & GYNECOLOGY

## 2023-08-12 PROCEDURE — 85461 HEMOGLOBIN FETAL: CPT

## 2023-08-12 PROCEDURE — 86850 RBC ANTIBODY SCREEN: CPT

## 2023-08-12 PROCEDURE — 86900 BLOOD TYPING SEROLOGIC ABO: CPT

## 2023-08-12 PROCEDURE — 86901 BLOOD TYPING SEROLOGIC RH(D): CPT

## 2023-08-12 RX ADMIN — HUMAN RHO(D) IMMUNE GLOBULIN 300 MCG: 300 INJECTION, SOLUTION INTRAMUSCULAR at 10:55

## 2023-08-12 RX ADMIN — ACETAMINOPHEN 650 MG: 325 TABLET, FILM COATED ORAL at 09:32

## 2023-08-12 RX ADMIN — IBUPROFEN 600 MG: 600 TABLET ORAL at 06:33

## 2023-08-12 RX ADMIN — IBUPROFEN 600 MG: 600 TABLET ORAL at 12:28

## 2023-08-12 RX ADMIN — STANDARDIZED SENNA CONCENTRATE 8.6 MG: 8.6 TABLET ORAL at 09:32

## 2023-08-12 RX ADMIN — IBUPROFEN 600 MG: 600 TABLET ORAL at 18:24

## 2023-08-12 RX ADMIN — BENZOCAINE AND LEVOMENTHOL 1 APPLICATION: 200; 5 SPRAY TOPICAL at 03:35

## 2023-08-12 RX ADMIN — IBUPROFEN 600 MG: 600 TABLET ORAL at 01:07

## 2023-08-12 NOTE — PLAN OF CARE
Problem: POSTPARTUM  Goal: Experiences normal postpartum course  Description: INTERVENTIONS:  - Monitor maternal vital signs  - Assess uterine involution and lochia  Outcome: Progressing  Goal: Appropriate maternal -  bonding  Description: INTERVENTIONS:  - Identify family support  - Assess for appropriate maternal/infant bonding   -Encourage maternal/infant bonding opportunities  - Referral to  or  as needed  Outcome: Progressing  Goal: Establishment of infant feeding pattern  Description: INTERVENTIONS:  - Assess breast/bottle feeding  - Refer to lactation as needed  Outcome: Progressing     Problem: ALTERATION IN THE BREAST  Goal: Optimize infant feeding at the breast  Description: INTERVENTIONS:  - Latch, breast and nipple assessment  - Assess prior breast feeding history  - Hand expression of breast milk  - For cracked, bleeding and or sore nipples reassess latch, treat damaged nipple  -Educate mother on feeding cues  -Positioning/latch techniques  Outcome: Progressing     Problem: INADEQUATE LATCH, SUCK OR SWALLOW  Goal: Demonstrate ability to latch and sustain latch, audible swallowing and satiety  Description: INTERVENTIONS:  - Assess oral anatomy, notify Physician/AP for abnormal findings  - Establish milk expression  - Maximize feeding opportunity (skin to skin, behavioral state)  - Position/latch techniques  - Discourage use of pacifier-artificial nipple  - Mechanical pumping  - Nipple Shield  - Supplemental formula feeding (Physician/AP order)  - Alternative feeding method  Outcome: Progressing     Problem: PAIN - ADULT  Goal: Verbalizes/displays adequate comfort level or baseline comfort level  Description: Interventions:  - Encourage patient to monitor pain and request assistance  - Assess pain using appropriate pain scale  - Administer analgesics based on type and severity of pain and evaluate response  - Implement non-pharmacological measures as appropriate and evaluate response  - Consider cultural and social influences on pain and pain management  - Notify physician/advanced practitioner if interventions unsuccessful or patient reports new pain  Outcome: Progressing     Problem: SAFETY ADULT  Goal: Patient will remain free of falls  Description: INTERVENTIONS:  - Educate patient/family on patient safety including physical limitations  - Instruct patient to call for assistance with activity   - Consult OT/PT to assist with strengthening/mobility   - Keep Call bell within reach  - Keep bed low and locked with side rails adjusted as appropriate  - Keep care items and personal belongings within reach  - Initiate and maintain comfort rounds  - Make Fall Risk Sign visible to staff  - Offer Toileting   Outcome: Progressing  Goal: Maintain or return to baseline ADL function  Description: INTERVENTIONS:  -  Assess patient's ability to carry out ADLs; assess patient's baseline for ADL function and identify physical deficits which impact ability to perform ADLs (bathing, care of mouth/teeth, toileting, grooming, dressing, etc.)  - Assess/evaluate cause of self-care deficits   - Assess range of motion  - Assess patient's mobility; develop plan if impaired  - Assess patient's need for assistive devices and provide as appropriate  - Encourage maximum independence but intervene and supervise when necessary  - Involve family in performance of ADLs  - Assess for home care needs following discharge   - Consider OT consult to assist with ADL evaluation and planning for discharge  - Provide patient education as appropriate  Outcome: Progressing     Problem: Knowledge Deficit  Goal: Patient/family/caregiver demonstrates understanding of disease process, treatment plan, medications, and discharge instructions  Description: Complete learning assessment and assess knowledge base.   Interventions:  - Provide teaching at level of understanding  - Provide teaching via preferred learning methods  Outcome: Progressing     Problem: DISCHARGE PLANNING  Goal: Discharge to home or other facility with appropriate resources  Description: INTERVENTIONS:  - Identify barriers to discharge w/patient and caregiver  - Arrange for needed discharge resources and transportation as appropriate  - Identify discharge learning needs (meds, wound care, etc.)  - Arrange for interpretive services to assist at discharge as needed  - Refer to Case Management Department for coordinating discharge planning if the patient needs post-hospital services based on physician/advanced practitioner order or complex needs related to functional status, cognitive ability, or social support system  Outcome: Progressing

## 2023-08-12 NOTE — PROGRESS NOTES
Progress Note - OB/GYN   Cira Ferrell 29 y.o. female MRN: [id number]  Unit/Bed#: -01 Encounter: [id number]      Assessment/Plan    Cira Ferrell is a 29 y.o. [id number] who is PPD 1 s/p  at 37w4d     Rh negative state in antepartum period  Assessment & Plan  Received Rhogam 23  Rhogam panel postpartum     HSV infection  Assessment & Plan  Valtrex ordered   Patient reports no active lesions in years and adamantly takes her valtrex. No lesions on exam    *  (spontaneous vaginal delivery)  Assessment & Plan  • Routine postpartum care  • Encourage ambulation  • Encourage familial bonding  • Lactation support as needed  • Pain: Motrin/Tylenol around the clock, oxycodone if needed         Disposition: Anticipate discharge home postpartum Day #2  Barriers to discharge: ongoing couplet care      Subjective/Objective     Subjective: Postpartum state    Pain: no  Tolerating PO: yes  Voiding: yes  Flatus: yes  BM: no  Ambulating: yes  Breastfeeding: Breastfeeding  Chest pain: no  Shortness of breath: no  Leg pain: no  Lochia: decreasing    Objective:     Vitals:  Vitals:    23 0015 23 0050 23 0125 23 0500   BP: 121/60 116/62 117/67 108/58   BP Location:  Right arm  Left arm   Pulse: 91 86 79 89   Resp: 18 20  18   Temp:  98.1 °F (36.7 °C)  98.3 °F (36.8 °C)   TempSrc:  Oral  Oral   SpO2:       Weight:       Height:           Physical Exam:   GEN: appears well, alert and oriented x 3, pleasant and cooperative   CV: Regular rate  RESP: non labored breathing  ABDOMEN: soft, no tenderness, no distention, Uterine fundus firm and non-tender, at the umbilicus  EXTREMITIES: non-tender  NEURO Alert and oriented to person, place, and time.        Lab Results   Component Value Date    WBC 9.55 2023    HGB 11.4 (L) 2023    HCT 34.4 (L) 2023    MCV 88 2023     2023       Schuyler Hodge MD  Obstetrics & Gynecology  23

## 2023-08-12 NOTE — DISCHARGE INSTRUCTIONS
Self Care After Delivery   AMBULATORY CARE:   The postpartum period is the period of time from delivery to about 6 weeks. During this time you may experience many physical and emotional changes. It is important to understand what is normal and when you need to call your healthcare provider. It is also important to know how to care for yourself during this time. Call your local emergency number (911 in the [address]) for any of the following: You see or hear things that are not there, or have thoughts of harming yourself or your baby. You soak through 1 pad in 15 minutes, have blurry vision, clammy or pale skin, and feel faint. You faint or lose consciousness. You have trouble breathing. You cough up blood. Your  incision comes apart. Seek care immediately if:   Your heart is beating faster than usual.     You have a bad headache or changes in your vision. Your perineal tear, episiotomy site, or  incision is red, swollen, bleeding, or draining pus. You have severe abdominal pain. Call your doctor or obstetrician if:   Your leg is painful, red, and larger than usual.     You soak through 1 or more pads in an hour, or pass blood clots larger than a quarter from your vagina. You have a fever. You have new or worsening pain in your abdomen or vagina. You continue to have depression 1 to 2 weeks after you deliver. You have trouble sleeping. You have foul-smelling discharge from your vagina. You have pain or burning when you urinate. You do not have a bowel movement for 3 days or more. You have nausea or are vomiting. You have hard lumps or red streaks over your breasts. You have cracked nipples or bleed from your nipples. You have questions or concerns about your condition or care. Physical changes:  The following are normal changes after you give birth:  Pain in the area between your anus and vagina     Breast pain Constipation or hemorrhoids     Hot or cold flashes     Vaginal bleeding or discharge     Mild to moderate abdominal cramping     Difficulty controlling bowel movements or urine     Emotional changes: A drop in hormone levels after you deliver may cause changes in your emotions. You may feel irritable, sad, or anxious. You may cry easily or for no reason. You may also feel depressed. Depression that continues can be a sign of postpartum depression, a condition that can be treated. Treatment may include talk therapy, medicines, or both. Healthcare providers will ask how you are feeling and if you have any depression. These talks can happen during appointments for your medical care and for your baby's care, such as well child visits. Providers can help you find ways to care for yourself and your baby. Talk to your providers about the following:  When emotional changes or depression started, and if it is getting worse over time     Problems you are having with daily activities, sleep, or caring for your baby     If anything makes you feel worse, or makes you feel better     Feeling that you are not bonding with your baby the way you want     Any problems your baby has with sleeping or feeding     Your baby is fussy or cries a lot     Support you have from friends, family, or others     Breast care for breastfeeding mothers: You may have sore breasts for 3 to 6 days after you give birth. This happens as your milk begins to fill your breasts. You may also have sore breasts if you do not breastfeed frequently. Do the following to care for your breasts:  Apply a moist, warm, compress to your breast as directed. This may help soothe your breasts. Make sure the washcloth is not too hot before you apply it to your breast.     Nurse your baby or pump your milk frequently. This may prevent clogged milk ducts. Ask your healthcare provider how often to nurse or pump. Massage your breasts as directed.  This may help increase your milk flow. Gently rub your breasts in a circular motion before you breastfeed. You may need to gently squeeze your breast or nipple to help release milk. You can also use a breast pump to help release milk from your breast.     Wash your breasts with warm water only. Do not put soap on your nipples. Soap may cause your nipples to become dry. Apply lanolin cream to your nipples as directed. Lanolin cream may add moisture to your skin and prevent nipple dryness. Always wash off lanolin cream with warm water before you breastfeed. Place pads in your bra. Your nipples may leak milk when you are not breastfeeding. You can place pads inside of your bra to help prevent leaking onto your clothing. Ask your healthcare provider where to purchase bra pads. Get breastfeeding support if needed. Healthcare providers can answer questions about breastfeeding and provide you with support. Ask your healthcare provider who you can contact if you need breastfeeding support. Breast care for non-breastfeeding mothers: Milk will fill your breasts even if you bottle feed your baby. Do the following to help stop your milk from filling your breasts and causing pain:  Wear a bra with support at all times. A sports bra or a tight-fitting bra will help stop your milk from coming in. Apply ice on each breast for 15 to 20 minutes every hour or as directed. Use an ice pack, or put crushed ice in a plastic bag. Cover it with a towel before you apply it to your breast. Ice helps your milk ducts shrink. Keep your breasts away from warm water. Warm water will make it easier for milk to fill your breasts. Stand with your breasts away from warm water in the shower. Limit how much you touch your breasts. This will prevent them from filling with milk. Perineum care: Your perineum is the area between your rectum and vagina. It is normal to have swelling and pain in this area after you give birth.  If you had an episiotomy, your healthcare provider may give you special instructions. Clean your perineum after you use the bathroom. This may prevent infection and help with healing. Use a spray bottle with warm water to clean your perineum. You may also gently spray warm water against your perineum when you urinate. Always wipe front to back. Take a sitz bath as directed. A sitz bath may help relieve swelling and pain. Fill your bath tub or bucket with water up to your hips and sit in the water. Use cold water for 2 days after you deliver. Then use warm water. Ask your healthcare provider for more information about a sitz bath. Apply ice packs for the first 24 hours or as directed. Use a plastic glove filled with ice or buy an ice pack. Wrap the ice pack or plastic glove in a small towel or wash cloth. Place the ice pack on your perineum for 20 minutes at a time. Sit on a donut-shaped pillow. This may relieve pressure on your perineum when you sit. Use wipes that contain medicine or take pills as directed. Your healthcare provider may tell you to use witch hazel pads. You can place witch hazel pads in the refrigerator before you apply them to your perineum. Your provider may also tell you to take NSAIDs. Ask him or her how often to take pills or use the wipes. Do not go swimming or take tub baths for 4 to 6 weeks or as directed. This will help prevent an infection in your vagina or uterus. Bowel and bladder care: It may take 3 to 5 days to have a bowel movement after you deliver your baby. You can do the following to prevent or manage constipation, and get control of your bowel or bladder:  Take stool softeners as directed. A stool softener is medicine that will make your bowel movements softer. This may prevent or relieve constipation. A stool softener may also make bowel movements less painful. Drink plenty of liquids. Ask how much liquid to drink each day and which liquids are best for you. Liquids may help prevent constipation. Eat foods high in fiber. Examples include fruits, vegetables, grains, beans, and lentils. Ask your healthcare provider how much fiber you need each day. Fiber may prevent constipation. Do Kegel exercises as directed. Kegel exercises will help strengthen the muscles that control bowel movements and urination. Ask your healthcare provider for more information on Kegel exercises. Apply cold compresses or medicine to hemorrhoids as directed. This may relieve swelling and pain. Your healthcare provider may tell you to apply ice or wipes that contain medicine to your hemorrhoids. He or she may also tell you to use a sitz bath. Ask your provider for more information on how to manage hemorrhoids. Nutrition: Good nutrition is important in the postpartum period. It will help you return to a healthy weight, increase your energy levels, and prevent constipation. It will also help you get enough nutrients and calories if you are going to breastfeed your baby. Eat a variety of healthy foods. Healthy foods include fruits, vegetables, whole-grain breads, low-fat dairy products, beans, lean meats, and fish. You may need 500 to 700 extra calories each day if you breastfeed your baby. You may also need extra protein. Limit foods with added sugar and high amounts of fat. These foods are high in calories and low in healthy nutrients. Read food labels so you know how much sugar and fat is in the food you want to eat. Drink 8 to 10 glasses of water per day. Water will help you make plenty of milk for your baby. It will also help prevent constipation. Drink a glass of water every time you breastfeed your baby. Take vitamins as directed. Ask your healthcare provider what vitamins you need. Limit caffeine and alcohol if you are breastfeeding. Caffeine and alcohol can get into your breast milk. Caffeine and alcohol can make your baby fussy.  They can also interfere with your baby's sleep. Ask your healthcare provider if you can drink alcohol or caffeine. Rest and sleep: You may feel very tired in the postpartum period. Enough sleep will help you heal and give you energy to care for your baby. The following may help you get sleep and rest:  Nap when your baby naps. Your baby may nap several times during the day. Get rest during this time. Limit visitors. Many people may want to see you and your baby. Ask friends or family to visit on different days. This will give you time to rest.     Do not plan too much for one day. Put off household chores so that you have time to rest. Gradually do more each day. Ask for help from family, friends, or neighbors. Ask them to help you with laundry, cleaning, or errands. Also ask someone to watch the baby while you take a nap or relax. Ask your partner to help with the care of your baby. Pump some of your breast milk so your partner can feed your baby during the night. Exercise after delivery: Wait until your healthcare provider says it is okay to exercise. Exercise can help you lose weight, increase your energy levels, and manage your mood. It can also prevent constipation and blood clots. Start with gentle exercises such as walking. Do more as you have more energy. You may need to avoid abdominal exercises for 1 to 2 weeks after you deliver. Talk to your healthcare provider about an exercise plan that is right for you. Sexual activity after delivery:   Do not have sex until your healthcare provider says it is okay. You may need to wait 4 to 6 weeks before you have sex. This may prevent infection and allow time to heal.     Your menstrual cycle may begin as soon as 3 weeks after you deliver. Your period may be delayed if you breastfeed your baby. You can become pregnant before you get your first postpartum period. Talk to your healthcare provider about birth control that is right for you.  Some types of birth control are not safe during breastfeeding. For support and more information: Join a support group for new mothers. Ask for help from family and friends with chores, errands, and care of your baby. Office of Elizabeth Zuñiga,  Department of Health and Human Services  [address], [address] [facility] , [address]  [address], [address] [facility] , [address]  Phone: [phone]  Web Address: www.womenshealth.gov  [facility] Postpartum [address] [facility] , [address]  [address] , [address]  Web Address: [employer]. org/pregnancy/postpartum-care. aspx  Follow up with your doctor or obstetrician as directed: You will need to follow up within 2 to 6 weeks of delivery. Write down your questions so you remember to ask them at your visits. © Copyright [employer] Information is for End User's use only and may not be sold, redistributed or otherwise used for commercial purposes. All illustrations and images included in CareNotes® are the copyrighted property of A.D.A[employer]M., Inc. or [employer]  The above information is an  only. It is not intended as medical advice for individual conditions or treatments. Talk to your doctor, nurse or pharmacist before following any medical regimen to see if it is safe and effective for you.

## 2023-08-12 NOTE — L&D DELIVERY NOTE
Vaginal Delivery Summary - OB/GYN   She Aguirre 29 y.o. female MRN: [id number]  Unit/Bed#: -01 Encounter: [id number]    Pre-delivery Diagnosis:   Pregnancy at 37w4d   History of HSV infection  Rh negative  History of  delivery  History of vaginal birth after  delivery    Post-delivery Diagnosis: same, delivered    Procedure: Spontaneous Vaginal Delivery     OBGYN Practice: [facility]    Attending Physician: Dr. Dinora Myrick  Resident Physician: Dr. Abiodun Ramos      Anesthesia:   Epidural    QBL: Non-Surgical QBL (mL): 50        Complications: none apparent    Specimens:   1. Arterial and venous cord gases  2. Cord blood  3. Segment of umbilical cord  4. Placenta to storage     Findings:  1. Viable male  on 2023  11:14 PM  via   . with apgar scores of 9  and 9  at 1 and 5 minutes, respectively. Weight pending at time of dictation for skin to skin bonding. 2. Spontaneous delivery of intact placenta at 2320  3. Intact perineum with no laceration      Gases:  Umbilical Artery  Recent Labs     23  2320   PHCART 7.230   BECART -7.9*       Umbilical Vein  Recent Labs     23  2320   PHCVEN 7.294   BECVEN -4.3*         Brief history and labor course:  She Aguirre is a 29 y.o. [id number] 39w1d . She presented to labor and delivery for elective induction. Her pregnancy was uncomplicated. On exam in triage she was noted to be 2.5/50/-2. She was admitted for induction with pitocin. Amniotomy was performed at 1500 for clear fluid. She progressed to complete and began pushing. Description of delivery:    After pushing for 8 minutes, Jan delivered a viable male , 9 lbs 1 oz. The fetal vertex delivered DANGELO position spontaneously. There was no nuchal cord. The anterior shoulder delivered atraumatically with maternal expulsive forces and the assistance of gentle downward traction.  The posterior shoulder and arm delivered with maternal expulsive forces and the assistance of gentle upward traction. The remainder of the fetus delivered spontaneously. Upon delivery, the infant was placed on Emeley's abdomen and the cord was doubly clamped and cut. Delayed cord clamping was achieved. The infant was noted to cry spontaneously and was moving all extremities appropriately. There was no evidence for injury. Awaiting nurse resuscitators evaluated the . Arterial and venous cord blood gases and cord blood was collected for analysis. These were promptly sent to the lab. In the immediate post-partum, active management of the 3rd stage of labor was performed with massage, the administration of 30 units of IV pitocin, and gentle traction on the umbilical cord. The placenta delivered spontaneously and was noted to have a centrally inserted 3 vessel cord. The placenta was sent to storage. Laceration Repair  The vagina, cervix, perineum, and rectum were inspected and there was noted to be no lacerations.     Disposition:  The patient and the  both tolerated the procedure well and are recovering in labor and delivery room       Brian Marquez MD  PGY 1, Obstetrics and Gynecology  2023  2:16 AM

## 2023-08-12 NOTE — OB LABOR/OXYTOCIN SAFETY PROGRESS
Oxytocin Safety Progress Check Note - Jan Rodriguez 29 y.o. female MRN: [id number]    Unit/Bed#: -01 Encounter: [id number]    Dose (thania-units/min) Oxytocin: 28 thania-units/min  Contraction Frequency (minutes): 2-3  Contraction Quality: Moderate  Tachysystole: No   Cervical Dilation: 7-8        Cervical Effacement: 90  Fetal Station: -1  Baseline Rate: 130 bpm  Fetal Heart Rate: 135 BPM  FHR Category: Category I  Oxytocin Safety Progress Check: Safety check completed            Vital Signs:   Vitals:    08/11/23 2015   BP: 122/70   Pulse: 85   Resp:    Temp:    SpO2:        Notes/comments:   Comfortable  Continue pitocin     Pema Conner MD 8/11/2023 8:46 PM

## 2023-08-12 NOTE — ANESTHESIA POSTPROCEDURE EVALUATION
Post-Op Assessment Note    CV Status:  Stable  Pain Score: 0    Pain management: adequate     Mental Status:  Alert and awake   Hydration Status:  Euvolemic   PONV Controlled:  Controlled   Airway Patency:  Patent      Post Op Vitals Reviewed: Yes        Post-op block assessment: catheter intact and no complications      No notable events documented.     BP      Temp      Pulse     Resp      SpO2

## 2023-08-12 NOTE — LACTATION NOTE
This note was copied from a baby's chart. CONSULT - LACTATION  Baby Boy Rodriguez (Emeley) 1 days male MRN: [id number]    [address] Room / Bed: (N)/(N) Encounter: [id number]    Maternal Information     MOTHER:  Jan Rodriguez  Maternal Age: 29 y.o.   OB History: # 1 - Date: 2019, Sex: None, Weight: None, GA: 5w0d, Delivery: Spontaneous , Apgar1: None, Apgar5: None, Living: None, Birth Comments: None    # 2A - Date: 20, Sex: Male, Weight: 2660 g (5 lb 13.8 oz), GA: 36w3d, Delivery: , Low Transverse, Apgar1: 8, Apgar5: 9, Living: Living, Birth Comments: None  # 2B - Date: 20, Sex: Male, Weight: 2235 g (4 lb 14.8 oz), GA: 36w3d, Delivery: , Low Transverse, Apgar1: 8, Apgar5: 9, Living: Living, Birth Comments: None    # 3 - Date: 2021, Sex: None, Weight: None, GA: None, Delivery: None, Apgar1: None, Apgar5: None, Living: None, Birth Comments: None    # 4 - Date: 22, Sex: Male, Weight: 3390 g (7 lb 7.6 oz), GA: 39w4d, Delivery: Vaginal, Spontaneous, Apgar1: 9, Apgar5: 9, Living: Living, Birth Comments: None    # 5 - Date: 23, Sex: Male, Weight: 4110 g (9 lb 1 oz), GA: 39w1d, Delivery: Vaginal, Spontaneous, Apgar1: 9, Apgar5: 9, Living: Living, Birth Comments: None   Previouse breast reduction surgery? No    Lactation history:   Has patient previously breast fed: Yes   How long had patient previously breast fed: attempted with 12 mo old for about a week and pumped for a few months. Formula fed her 2 yo twins. Previous breast feeding complications:  Other (Comment) (Latch issues, did follow up at the [facility] and  [address]     Past Surgical History:   Procedure Laterality Date   •  SECTION      primary for twins, B was breech   • OVARIAN CYST REMOVAL Left     dermoid cyst removed, pt still has ovary   • LA TX MISSED  FIRST TRIMESTER SURGICAL N/A 2021    Procedure: DILATATION AND EVACUATION (D&E) (8 WEEKS); Surgeon: Vahid Chavira MD;  [facility]: [facility];  Service: Gynecology   • SKIN BIOPSY     • TONSILLECTOMY  2015   • [address] EXTRACTION  2012        Birth information:  YOB: 2023   Time of birth: 11:14 PM   Sex: male   Delivery type: Vaginal, Spontaneous   Birth Weight: 4110 g (9 lb 1 oz)   Percent of Weight Change: 0%     Gestational Age: 37w4d   [unfilled]    Assessment     Breast and nipple assessment: normal assessment     Assessment: normal assessment ( receded chin )    Feeding assessment: Latch not observed. LATCH:  Latch: Audible Swallowing:    Type of Nipple:    Comfort (Breast/Nipple):    Hold (Positioning):    LATCH Score:         Feeding recommendations:  Place baby at the breast every 2-3 hours. Met with Marlynn Prader and provided her with the Ready Set Baby and the Discharge Breastfeeding Booklets. Marlynn Prader states that she is familiar with the booklets and will page through them later. She has no questions at this time. Positioning and latch reviewed:    - Position baby up at chest level using pillows for support    - Bring baby to breast,not breast to baby ( no hunching over )   - Align nose to nipple and drag nipple down to chin to achieve a wide open mouth and a deep latch   - Baby's ear, shoulder, hip in alignment   - Baby's upper and lower lip should be flanged on the breast.    Mom states that baby has been latching well so far and she feels that baby is getting an adequate of breast tissue into his mouth. Her nipples are slightly reddened but no cracking, bleeding or blistering noted. Discussed s/s engorgement, blocked milk ducts, and mastitis. Discussed how to remedy at home and when to contact physician. Booklet included Breastfeeding Resources for after discharge including access to the number for the [employer] for follow up breastfeeding support as needed.  She is familiar with the Center as she did follow up there with her 13 month old son. Encouraged her to call with any additional questions or concerns and to utilize nursing staff as needed.                  Alejandro Caldera RN 8/12/2023 6:13 PM

## 2023-08-12 NOTE — PLAN OF CARE
Problem: Knowledge Deficit  Goal: Verbalizes understanding of labor plan  Description: Assess patient/family/caregiver's baseline knowledge level and ability to understand information. Provide education via patient/family/caregiver's preferred learning method at appropriate level of understanding. 1. Provide teaching at level of understanding. 2. Provide teaching via preferred learning method(s). Outcome: Completed     Problem: Labor & Delivery  Goal: Manages discomfort  Description: Assess and monitor for signs and symptoms of discomfort. Assess patient's pain level regularly and per hospital policy. Administer medications as ordered. Support use of nonpharmacological methods to help control pain such as distraction, imagery, relaxation, and application of heat and cold. Collaborate with interdisciplinary team and patient to determine appropriate pain management plan. 1. Include patient in decisions related to comfort. 2. Offer non-pharmacological pain management interventions. 3. Report ineffective pain management to physician. Outcome: Completed  Goal: Patient vital signs are stable  Description: 1. Assess vital signs - vaginal delivery.   Outcome: Completed     Problem: POSTPARTUM  Goal: Experiences normal postpartum course  Description: INTERVENTIONS:  - Monitor maternal vital signs  - Assess uterine involution and lochia  Outcome: Progressing  Goal: Appropriate maternal -  bonding  Description: INTERVENTIONS:  - Identify family support  - Assess for appropriate maternal/infant bonding   -Encourage maternal/infant bonding opportunities  - Referral to  or  as needed  Outcome: Progressing  Goal: Establishment of infant feeding pattern  Description: INTERVENTIONS:  - Assess breast/bottle feeding  - Refer to lactation as needed  Outcome: Progressing     Problem: ALTERATION IN THE BREAST  Goal: Optimize infant feeding at the breast  Description: INTERVENTIONS:  - Latch, breast and nipple assessment  - Assess prior breast feeding history  - Hand expression of breast milk  - For cracked, bleeding and or sore nipples reassess latch, treat damaged nipple  -Educate mother on feeding cues  -Positioning/latch techniques  Outcome: Progressing     Problem: INADEQUATE LATCH, SUCK OR SWALLOW  Goal: Demonstrate ability to latch and sustain latch, audible swallowing and satiety  Description: INTERVENTIONS:  - Assess oral anatomy, notify Physician/AP for abnormal findings  - Establish milk expression  - Maximize feeding opportunity (skin to skin, behavioral state)  - Position/latch techniques  - Discourage use of pacifier-artificial nipple  - Mechanical pumping  - Nipple Shield  - Supplemental formula feeding (Physician/AP order)  - Alternative feeding method  Outcome: Progressing     Problem: PAIN - ADULT  Goal: Verbalizes/displays adequate comfort level or baseline comfort level  Description: Interventions:  - Encourage patient to monitor pain and request assistance  - Assess pain using appropriate pain scale  - Administer analgesics based on type and severity of pain and evaluate response  - Implement non-pharmacological measures as appropriate and evaluate response  - Consider cultural and social influences on pain and pain management  - Notify physician/advanced practitioner if interventions unsuccessful or patient reports new pain  Outcome: Progressing     Problem: SAFETY ADULT  Goal: Patient will remain free of falls  Description: INTERVENTIONS:  - Educate patient/family on patient safety including physical limitations  - Instruct patient to call for assistance with activity   - Consult OT/PT to assist with strengthening/mobility   - Keep Call bell within reach  - Keep bed low and locked with side rails adjusted as appropriate  - Keep care items and personal belongings within reach  - Initiate and maintain comfort rounds  - Make Fall Risk Sign visible to staff  - Offer Toileting Outcome: Progressing  Goal: Maintain or return to baseline ADL function  Description: INTERVENTIONS:  -  Assess patient's ability to carry out ADLs; assess patient's baseline for ADL function and identify physical deficits which impact ability to perform ADLs (bathing, care of mouth/teeth, toileting, grooming, dressing, etc.)  - Assess/evaluate cause of self-care deficits   - Assess range of motion  - Assess patient's mobility; develop plan if impaired  - Assess patient's need for assistive devices and provide as appropriate  - Encourage maximum independence but intervene and supervise when necessary  - Involve family in performance of ADLs  - Assess for home care needs following discharge   - Consider OT consult to assist with ADL evaluation and planning for discharge  - Provide patient education as appropriate  Outcome: Progressing     Problem: Knowledge Deficit  Goal: Patient/family/caregiver demonstrates understanding of disease process, treatment plan, medications, and discharge instructions  Description: Complete learning assessment and assess knowledge base.   Interventions:  - Provide teaching at level of understanding  - Provide teaching via preferred learning methods  Outcome: Progressing     Problem: DISCHARGE PLANNING  Goal: Discharge to home or other facility with appropriate resources  Description: INTERVENTIONS:  - Identify barriers to discharge w/patient and caregiver  - Arrange for needed discharge resources and transportation as appropriate  - Identify discharge learning needs (meds, wound care, etc.)  - Arrange for interpretive services to assist at discharge as needed  - Refer to Case Management Department for coordinating discharge planning if the patient needs post-hospital services based on physician/advanced practitioner order or complex needs related to functional status, cognitive ability, or social support system  Outcome: Progressing

## 2023-08-13 VITALS
HEART RATE: 76 BPM | DIASTOLIC BLOOD PRESSURE: 74 MMHG | OXYGEN SATURATION: 97 % | HEIGHT: 66 IN | WEIGHT: 228 LBS | RESPIRATION RATE: 18 BRPM | SYSTOLIC BLOOD PRESSURE: 122 MMHG | TEMPERATURE: 97.8 F | BODY MASS INDEX: 36.64 KG/M2

## 2023-08-13 PROCEDURE — 99024 POSTOP FOLLOW-UP VISIT: CPT | Performed by: OBSTETRICS & GYNECOLOGY

## 2023-08-13 RX ORDER — IBUPROFEN 600 MG/1
600 TABLET ORAL EVERY 6 HOURS
Qty: 30 TABLET | Refills: 0 | Status: SHIPPED | OUTPATIENT
Start: 2023-08-13

## 2023-08-13 RX ORDER — DIAPER,BRIEF,INFANT-TODD,DISP
1 EACH MISCELLANEOUS DAILY PRN
Refills: 0
Start: 2023-08-13

## 2023-08-13 RX ORDER — ACETAMINOPHEN 325 MG/1
650 TABLET ORAL EVERY 4 HOURS PRN
Refills: 0
Start: 2023-08-13

## 2023-08-13 RX ADMIN — IBUPROFEN 600 MG: 600 TABLET ORAL at 00:09

## 2023-08-13 RX ADMIN — IBUPROFEN 600 MG: 600 TABLET ORAL at 06:19

## 2023-08-13 RX ADMIN — STANDARDIZED SENNA CONCENTRATE 8.6 MG: 8.6 TABLET ORAL at 07:41

## 2023-08-13 NOTE — PROGRESS NOTES
Progress Note - OB/GYN   Tiara Gallagher 29 y.o. female MRN: [id number]  Unit/Bed#: -01 Encounter: [id number]      Assessment/Plan    Tiara Gallagher is a 29 y.o. [id number] who is PPD 2 s/p  at 39w1d     *  (spontaneous vaginal delivery)  Assessment & Plan  • Routine postpartum care  • Encourage ambulation  • Encourage familial bonding  • Lactation support as needed  • Pain: Motrin/Tylenol around the clock, oxycodone if needed      Rh negative state in antepartum period  Assessment & Plan  Baby A pos; Received Rhogam      HSV infection  Assessment & Plan  Valtrex ordered   Patient reports no active lesions in years and adamantly takes her valtrex. No lesions on exam     Disposition: Anticipate discharge home postpartum Day #2  Barriers to discharge: none      Subjective/Objective     Subjective: Postpartum state    Pain: no  Tolerating PO: yes  Voiding: yes  Flatus: yes  BM: no  Ambulating: yes  Breastfeeding: Breastfeeding  Chest pain: no  Shortness of breath: no  Leg pain: no  Lochia: trace    Objective:     Vitals:  Vitals:    23 1152 23 1542 23 1952 23 0119   BP: 115/55 120/58 125/71 105/62   BP Location: Left arm Left arm Right arm Right arm   Pulse: 79 83 72 74   Resp: 18 18 18 18   Temp: 97.9 °F (36.6 °C) 97.5 °F (36.4 °C) 98 °F (36.7 °C) 97.9 °F (36.6 °C)   TempSrc: Oral Oral Oral Oral   SpO2: 97% 97%  97%   Weight:       Height:           Physical Exam:   GEN: appears well, alert and oriented x 3, pleasant and cooperative   CV: Regular rate  RESP: non labored breathing  ABDOMEN: soft, no tenderness, no distention, Uterine fundus firm and non-tender, at the umbilicus  EXTREMITIES: non-tender  NEURO Alert and oriented to person, place, and time.        Lab Results   Component Value Date    WBC 9.55 2023    HGB 11.4 (L) 2023    HCT 34.4 (L) 2023    MCV 88 2023     2023         Bart Blanton MD  Obstetrics & Gynecology  23

## 2023-08-13 NOTE — PLAN OF CARE
Problem: POSTPARTUM  Goal: Experiences normal postpartum course  Description: INTERVENTIONS:  - Monitor maternal vital signs  - Assess uterine involution and lochia  Outcome: Completed  Goal: Appropriate maternal -  bonding  Description: INTERVENTIONS:  - Identify family support  - Assess for appropriate maternal/infant bonding   -Encourage maternal/infant bonding opportunities  - Referral to  or  as needed  Outcome: Completed  Goal: Establishment of infant feeding pattern  Description: INTERVENTIONS:  - Assess breast/bottle feeding  - Refer to lactation as needed  Outcome: Completed     Problem: ALTERATION IN THE BREAST  Goal: Optimize infant feeding at the breast  Description: INTERVENTIONS:  - Latch, breast and nipple assessment  - Assess prior breast feeding history  - Hand expression of breast milk  - For cracked, bleeding and or sore nipples reassess latch, treat damaged nipple  -Educate mother on feeding cues  -Positioning/latch techniques  Outcome: Completed     Problem: INADEQUATE LATCH, SUCK OR SWALLOW  Goal: Demonstrate ability to latch and sustain latch, audible swallowing and satiety  Description: INTERVENTIONS:  - Assess oral anatomy, notify Physician/AP for abnormal findings  - Establish milk expression  - Maximize feeding opportunity (skin to skin, behavioral state)  - Position/latch techniques  - Discourage use of pacifier-artificial nipple  - Mechanical pumping  - Nipple Shield  - Supplemental formula feeding (Physician/AP order)  - Alternative feeding method  Outcome: Completed     Problem: PAIN - ADULT  Goal: Verbalizes/displays adequate comfort level or baseline comfort level  Description: Interventions:  - Encourage patient to monitor pain and request assistance  - Assess pain using appropriate pain scale  - Administer analgesics based on type and severity of pain and evaluate response  - Implement non-pharmacological measures as appropriate and evaluate response  - Consider cultural and social influences on pain and pain management  - Notify physician/advanced practitioner if interventions unsuccessful or patient reports new pain  Outcome: Completed     Problem: SAFETY ADULT  Goal: Patient will remain free of falls  Description: INTERVENTIONS:  - Educate patient/family on patient safety including physical limitations  - Instruct patient to call for assistance with activity   - Consult OT/PT to assist with strengthening/mobility   - Keep Call bell within reach  - Keep bed low and locked with side rails adjusted as appropriate  - Keep care items and personal belongings within reach  - Initiate and maintain comfort rounds  - Make Fall Risk Sign visible to staff  - Offer Toileting   Outcome: Completed  Goal: Maintain or return to baseline ADL function  Description: INTERVENTIONS:  -  Assess patient's ability to carry out ADLs; assess patient's baseline for ADL function and identify physical deficits which impact ability to perform ADLs (bathing, care of mouth/teeth, toileting, grooming, dressing, etc.)  - Assess/evaluate cause of self-care deficits   - Assess range of motion  - Assess patient's mobility; develop plan if impaired  - Assess patient's need for assistive devices and provide as appropriate  - Encourage maximum independence but intervene and supervise when necessary  - Involve family in performance of ADLs  - Assess for home care needs following discharge   - Consider OT consult to assist with ADL evaluation and planning for discharge  - Provide patient education as appropriate  Outcome: Completed     Problem: Knowledge Deficit  Goal: Patient/family/caregiver demonstrates understanding of disease process, treatment plan, medications, and discharge instructions  Description: Complete learning assessment and assess knowledge base.   Interventions:  - Provide teaching at level of understanding  - Provide teaching via preferred learning methods  Outcome: Completed     Problem: DISCHARGE PLANNING  Goal: Discharge to home or other facility with appropriate resources  Description: INTERVENTIONS:  - Identify barriers to discharge w/patient and caregiver  - Arrange for needed discharge resources and transportation as appropriate  - Identify discharge learning needs (meds, wound care, etc.)  - Arrange for interpretive services to assist at discharge as needed  - Refer to Case Management Department for coordinating discharge planning if the patient needs post-hospital services based on physician/advanced practitioner order or complex needs related to functional status, cognitive ability, or social support system  Outcome: Completed

## 2023-08-15 ENCOUNTER — TELEPHONE (OUTPATIENT)
Dept: OBGYN CLINIC | Facility: CLINIC | Age: 28
End: 2023-08-15

## 2023-08-15 NOTE — UTILIZATION REVIEW
UNABLE TO OBTAIN AUTH NUMBER IN [facility]    MOTHER AND BABY DISCHARGED AUG 15      NOTIFICATION OF INPATIENT ADMISSION   MATERNITY/DELIVERY AUTHORIZATION REQUEST   SERVICING FACILITY:   [address] [place] [address] - L&D, , NICU  [facility]. [facility], [address] [place] [address]  Tax ID: [id number]  NPI: [id number]   ATTENDING PROVIDER:  Attending Name and NPI#: Corrine Lombardi [[id number]]  Address: [address] [place] [address], [address] [place] [address]  Phone: [phone]   ADMISSION INFORMATION:  Place of Service: Inpatient [address] [facility]  Place of Service Code: 21  Inpatient Admission Date/Time: 23  6:04 AM  Discharge Date/Time: 2023  2:00 PM  Admitting Diagnosis Code/Description:  Pregnant [Z34.90]  39 weeks gestation of pregnancy [Z3A.39]  Encounter for induction of labor [Z34.90]  Encounter for full-term uncomplicated delivery [Q77]     Mother: Honey Ya 1995 Estimated Date of Delivery: 23  Delivering clinician: Geno Carrillo    OB History        5    Para   3    Term   2       1    AB   2    Living   4       SAB   2    IAB   0    Ectopic   0    Multiple   1    Live Births   4           Obstetric Comments   Menarche 15           Sunman Name & MRN:   Information for the patient's :  Kary Mayes [[id number]]     Sunman Delivery Information:  Sex: male  Delivered 2023 11:14 PM by Vaginal, Spontaneous; Gestational Age: 37w4d     Measurements:  Weight: 9 lb 1 oz (4110 g); Height: 20.5"    APGAR 1 minute 5 minutes 10 minutes   Totals: 9 9       Birth Information: 29 y.o. female MRN: [id number] Unit/Bed#: -01   Birthweight: No birth weight on file. Gestational Age: <None> Delivery Type:    APGARS Totals:        UTILIZATION REVIEW CONTACT:  [place], Utilization   Network Utilization Review Department  Phone: [phone]  Fax [phone]  Email: [email or web address]  Contact for approvals/pending authorizations, clinical reviews, and discharge. PHYSICIAN ADVISORY SERVICES:  Medical Necessity Denial & [facility] Review  Phone: [phone]  Fax: [phone]  Email: [email or web address]

## 2023-08-15 NOTE — TELEPHONE ENCOUNTER
Patient called and left a message on the nurse line stating that she delivered on 8/11/23 and this is her third child but the first time she is breastfeeding. She states that she is getting an anxious feeling and heart palpitations between feeding sessions, so she is requesting a call back to discuss this further with a you. Thank you.

## 2023-08-18 LAB — PLACENTA IN STORAGE: NORMAL

## 2023-08-25 ENCOUNTER — TELEPHONE (OUTPATIENT)
Dept: OBGYN CLINIC | Facility: CLINIC | Age: 28
End: 2023-08-25

## 2023-08-25 NOTE — TELEPHONE ENCOUNTER
Patient called and left a message on the nurse line, she delivered on 8/11. She is breastfeeding and for the last 2 days she has been having a burning sharp pain and increased sensitivity in her nipples. She wanted to know if we could send an All Purpose Nipple Ointment to the pharmacy for her?

## 2023-08-25 NOTE — TELEPHONE ENCOUNTER
Yes, please call it in to the pharmacy.  Apply 4x/daily to affected area, wipe off prior to breastfeeding

## 2023-08-25 NOTE — TELEPHONE ENCOUNTER
LM on patients phone. [facility] does not carry the medication. Called [facility], they carry the medication.  verbal given to pharmacist

## 2023-10-02 ENCOUNTER — POSTPARTUM VISIT (OUTPATIENT)
Dept: OBGYN CLINIC | Facility: CLINIC | Age: 28
End: 2023-10-02

## 2023-10-02 VITALS
SYSTOLIC BLOOD PRESSURE: 118 MMHG | BODY MASS INDEX: 33.43 KG/M2 | DIASTOLIC BLOOD PRESSURE: 72 MMHG | WEIGHT: 208 LBS | HEIGHT: 66 IN

## 2023-10-02 PROBLEM — O09.899 SHORT INTERVAL BETWEEN PREGNANCIES AFFECTING PREGNANCY, ANTEPARTUM: Status: RESOLVED | Noted: 2023-02-09 | Resolved: 2023-10-02

## 2023-10-02 PROBLEM — O34.219 VBAC, DELIVERED: Status: RESOLVED | Noted: 2023-08-01 | Resolved: 2023-10-02

## 2023-10-02 PROBLEM — Z3A.38 38 WEEKS GESTATION OF PREGNANCY: Status: RESOLVED | Noted: 2023-06-13 | Resolved: 2023-10-02

## 2023-10-02 PROBLEM — Z67.91 RH NEGATIVE STATE IN ANTEPARTUM PERIOD: Status: RESOLVED | Noted: 2023-08-11 | Resolved: 2023-10-02

## 2023-10-02 PROBLEM — O99.213 OBESITY AFFECTING PREGNANCY IN THIRD TRIMESTER: Status: RESOLVED | Noted: 2023-04-12 | Resolved: 2023-10-02

## 2023-10-02 PROBLEM — O26.899 RH NEGATIVE STATE IN ANTEPARTUM PERIOD: Status: RESOLVED | Noted: 2023-08-11 | Resolved: 2023-10-02

## 2023-10-02 PROBLEM — O09.899 HISTORY OF PRETERM DELIVERY, CURRENTLY PREGNANT: Status: RESOLVED | Noted: 2023-04-12 | Resolved: 2023-10-02

## 2023-10-02 PROBLEM — O34.219 HISTORY OF CESAREAN DELIVERY, ANTEPARTUM: Status: RESOLVED | Noted: 2023-02-09 | Resolved: 2023-10-02

## 2023-10-02 PROCEDURE — 99024 POSTOP FOLLOW-UP VISIT: CPT | Performed by: OBSTETRICS & GYNECOLOGY

## 2023-10-02 NOTE — PROGRESS NOTES
Assessment/Plan     Normal postpartum exam.     1. Contraception: condoms  2. Annual exam due in January. 3. Lactation consult, [address] information discussed. 4. Increase activity as tolerated, may resume all normal activity. 5. Anticipated return to work: 6 - 12 weeks post partum. Subjective   Chief Complaint   Patient presents with   • Postpartum Care     Patient is here today for her 6 week postpartum follow up,  23, baby boy(Walker), 9lb Stephania Lute. Patient is doing well, she has no concerns at this time. She declines BC at this time. Marcello Reaves is a 29 y.o. [id number] female who presents for a postpartum visit. Delivery Method: Vaginal, Spontaneous    Delivery Date and Time: 2023  11:14 PM   Delivery Attending: Kelsey Bradford   Gestational Age at delivery: 37w4d   Route of Delivery: Vaginal, Spontaneous -         Admitting Diagnoses:   Patient Active Problem List   Diagnosis   • PONV (postoperative nausea and vomiting)   • HSV infection   • History of  delivery, antepartum   • Short interval between pregnancies affecting pregnancy, antepartum   • History of  delivery, currently pregnant   • Obesity affecting pregnancy in third trimester   • 38 weeks gestation of pregnancy   • , delivered   •  (spontaneous vaginal delivery)   • Rh negative state in antepartum period       Gestational Diabetes: no  Pregnancy Complications: none    Anesthesia: Epidural ,     Delivery: Vaginal, Spontaneous  at 2023  11:14 PM   Laceration: Perineal: None     Baby's Weight: 4110 g (9 lb 1 oz) ; 144.97     Apgar scores: 9  and 9  at 1 and 5 minutes, respectively  Baby's Name: Storm Laboy  Breast or formula: Breastfeeding    Bleeding no bleeding. Bowel function: normal. Bladder function: normal.     Patient has been sexually active. Desired contraception method is condoms. Postpartum depression screening: negative.  EPDS : 1    Last Pap :  ; no abnormalities      The following portions of the patient's history were reviewed and updated as appropriate: allergies, current medications, past family history, past medical history, past social history, past surgical history and problem list.      Current Outpatient Medications:   •  acetaminophen (TYLENOL) 325 mg tablet, Take 2 tablets (650 mg total) by mouth every 4 (four) hours as needed for mild pain, Disp: , Rfl: 0  •  benzocaine-menthol-lanolin-aloe (DERMOPLAST) 20-0.5 % topical spray, Apply 1 Application topically every 6 (six) hours as needed for mild pain, Disp: , Rfl: 0  •  cholecalciferol (VITAMIN D3) 1,000 units tablet, Take 1,000 Units by mouth daily, Disp: , Rfl:   •  hydrocortisone 1 % cream, Apply 1 Application topically daily as needed for irritation, Disp: , Rfl: 0  •  ibuprofen (MOTRIN) 600 mg tablet, Take 1 tablet (600 mg total) by mouth every 6 (six) hours, Disp: 30 tablet, Rfl: 0  •  magnesium gluconate (MAGONATE) 500 mg tablet, Take 120 mg by mouth 2 (two) times a day, Disp: , Rfl:   •  Prenatal Vit-Fe Fumarate-FA (BL PRENATAL VITAMINS PO), Take by mouth, Disp: , Rfl:   •  Probiotic Product (PROBIOTIC-10 PO), Take by mouth, Disp: , Rfl:   •  valACYclovir (VALTREX) 500 mg tablet, Take 1 tablet (500 mg total) by mouth daily, Disp: 30 tablet, Rfl: 1  •  witch hazel-glycerin (TUCKS) topical pad, Apply 1 Pad topically every 4 (four) hours as needed for irritation, Disp: , Rfl: 0    No Known Allergies    Review of Systems  Review of Systems   Constitutional: Negative. HENT: Negative. Respiratory: Negative. Cardiovascular: Negative. Gastrointestinal: Negative. Genitourinary: Negative. Neurological: Negative. Psychiatric/Behavioral: Negative.           Objective      /72 (BP Location: Right arm, Patient Position: Sitting, Cuff Size: Large)   Ht 5' 6" (1.676 m)   Wt 94.3 kg (208 lb)   LMP 10/22/2022 (Exact Date)   Breastfeeding Yes   BMI 33.57 kg/m²     Physical Exam  Constitutional:       Appearance: Normal appearance. HENT:      Head: Normocephalic and atraumatic. Eyes:      Extraocular Movements: Extraocular movements intact. Conjunctiva/sclera: Conjunctivae normal.      Pupils: Pupils are equal, round, and reactive to light. Pulmonary:      Effort: Pulmonary effort is normal.   Neurological:      General: No focal deficit present. Mental Status: She is alert and oriented to person, place, and time. Skin:     General: Skin is warm and dry. Psychiatric:         Mood and Affect: Mood normal.         Behavior: Behavior normal.   Vitals and nursing note reviewed.

## 2024-03-14 NOTE — PROGRESS NOTES
Assessment/Plan:    - RTO 1 week for repeat early US  - RTO 2 weeks for OB intake     Diagnoses and all orders for this visit:    Amenorrhea  -     Cancel: Pap IG, CtNg,rfxHPV ASCU,16/18  -     POCT urine HCG  -     Chlamydia/GC amplified DNA by PCR    Screening for malignant neoplasm of cervix  -     Cancel: Pap IG, CtNg,rfxHPV ASCU,16/18  -     Chlamydia/GC amplified DNA by PCR    Screening for STD (sexually transmitted disease)  -     Cancel: Pap IG, CtNg,rfxHPV ASCU,16/18  -     Chlamydia/GC amplified DNA by PCR    Other orders  -     aspirin (ECOTRIN LOW STRENGTH) 81 mg EC tablet; Take 81 mg by mouth  -     Discontinue: doxycycline hyclate (VIBRAMYCIN) 100 mg capsule; take 1 capsule by mouth twice a day for 3 days  WHEN DIRECTED  -     levothyroxine 25 mcg tablet; take 1 tablet by mouth once daily AT LEAST 30 MINUTES PRIOR TO BREAKFAST/OTHER MEDS  -     Endometrin 100 MG vaginal insert; Takes 4 times a day  -     valACYclovir (VALTREX) 500 mg tablet; Take 500 mg by mouth  -     cholecalciferol (VITAMIN D3) 1,000 units tablet; Take 1,000 Units by mouth daily          Subjective:      Patient ID: Yusra Almodovar is a 32 y o  female  Patient is a 25YO [id number] WF presenting to the office for missed period and early confirmation of pregnancy via [address]  She is feeling well at this time and has no complaints  Patient reports her LMP as 2/16/21, placing her at approx 8 weeks today  She currently has 10mo twin boys and is not entirely sure of when she ovulated  The following portions of the patient's history were reviewed and updated as appropriate: allergies, current medications, past family history, past medical history, past social history, past surgical history and problem list     Review of Systems   Constitutional: Negative for chills, fever and unexpected weight change  Respiratory: Negative for shortness of breath  Cardiovascular: Negative for chest pain     Gastrointestinal: Negative for abdominal pain, Patient Active Problem List   Diagnosis    MDS (myelodysplastic syndrome) (H)    Acquired hypothyroidism    Bilateral carpal tunnel syndrome    Bilateral knee pain    Meibomian gland disease    Health care maintenance    Mixed hyperlipidemia    Major depression, recurrent (H24)    Muscular fasciculation    RUPESH (obstructive sleep apnea)    Osteoarthritis, knee    Patellofemoral pain syndrome    Posterior vitreous detachment    Prediabetes    Presbyopia    PVD (posterior vitreous detachment), both eyes    Status post bone marrow transplant (H)    History of bone marrow transplant (H)    Immunosuppression (H24)    Other acute pulmonary embolism with acute cor pulmonale (H)    Acute pulmonary embolism without acute cor pulmonale, unspecified pulmonary embolism type (H)    Failure to thrive (0-17)    Physical deconditioning    Intracardiac thrombus    Back pain    Left knee pain    Osteoporosis    Generalized weakness    Myelodysplasia (myelodysplastic syndrome) (H)    History of peripheral stem cell transplant (H)    Type 2 diabetes mellitus without complication, without long-term current use of insulin (H)    Sarcoidosis    Hypotension, unspecified hypotension type    Morbid obesity (H)    Sarcoidosis of other sites    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)    Myelopathy (H)    Soft tissue infection    Skin ulcer of right lower leg with fat layer exposed (H)     Past Medical History:   Diagnosis Date    Adult failure to thrive     Arthritis     Cataract     Depression     GVHD as complication of bone marrow transplant (H)     HLD (hyperlipidemia)     Hyperlipidemia     Hypotension, unspecified hypotension type     Hypothyroidism     Myelodysplastic syndrome (H)     Obesity     RUPESH (obstructive sleep apnea)     Osteoporosis     Pulmonary embolism (H)     Type 2 diabetes mellitus without complication, without long-term current use of insulin (H) 08/23/2022     Labs:   Recent Labs   Lab Test  03/11/24  1422 11/29/23  0407 11/28/23  1248 07/21/23  1123 06/17/23  0811 03/08/22  1614 02/24/22  1402   ALBUMIN 3.6   < >  --    < >  --    < > 3.2*   HGB 14.6   < >  --    < >  --    < > 14.7   INR  --   --  1.05   < >  --    < > 1.83*   WBC 4.9   < >  --    < >  --    < > 5.6   A1C  --   --   --   --  6.9*   < >  --    CRP  --   --   --   --   --   --  <2.9    < > = values in this interval not displayed.     Nutrition requirements were discussed with patient today.  Vitals:  /69 (BP Location: Left arm, Patient Position: Chair, Cuff Size: Adult Regular)   Pulse 84   Temp (!) 96.5  F (35.8  C) (Temporal)   Wound:   Wound (used by OP [facility] only) 01/04/24 1228 Right anterior;lower leg unspecified (Active)   Thickness/Stage full thickness 03/14/24 0800   Base granulating;epithelialization 03/14/24 0800   Periwound intact 03/14/24 0800   Periwound Temperature warm 03/14/24 0800   Periwound Skin Turgor soft 03/14/24 0800   Edges open 03/14/24 0800   Length (cm) 0.4 03/14/24 0800   Width (cm) 1 03/14/24 0800   Depth (cm) 0.1 03/14/24 0800   Wound (cm^2) 0.4 cm^2 03/14/24 0800   Wound Volume (cm^3) 0.04 cm^3 03/14/24 0800   Wound healing % 95.03 03/14/24 0800   Drainage Characteristics/Odor serosanguineous 03/14/24 0800   Drainage Amount moderate 03/14/24 0800   Care, Wound non-select wound debridement performed. 03/14/24 0800      Photo:             Further instructions from your care team         Jadon Lei   1955    A DME order for supplies has been placed to [email or web address].   If there are any issues with your order including not receiving the order please call [facility] at [phone].   They can also provide a tracking number for you.    Your next appointment is a video visit. Photo(s) are needed for that visit of your wound(s) and surrounding skin. Please include a tape measure or ruler of some sort in your photo for reference. The photo(s) should be uploaded to your MyChart.  Our office will send you  diarrhea, nausea and vomiting  Skin: Negative for rash  Psychiatric/Behavioral: Negative for dysphoric mood  The patient is not nervous/anxious  Objective:      /64   Ht 5' 6" (1 676 m)   Wt 81 kg (178 lb 9 6 oz)   LMP 02/16/2021 (Exact Date)   BMI 28 83 kg/m²          Physical Exam  Constitutional:       Appearance: Normal appearance  She is normal weight  HENT:      Head: Normocephalic and atraumatic  Pulmonary:      Effort: Pulmonary effort is normal    Genitourinary:     Pubic Area: No rash  Labia:         Right: No rash or lesion  Left: No rash or lesion  Vagina: Normal  No vaginal discharge, erythema or lesions  Cervix: No discharge, lesion or erythema  Uterus: Normal        Adnexa: Right adnexa normal and left adnexa normal         Right: No mass, tenderness or fullness  Left: No mass, tenderness or fullness  Comments: TVUS reveals IUP, yolk sac, fetal pole, +CM  CRL 6w4d  FHR 128bpm  Skin:     General: Skin is warm and dry  Findings: No lesion or rash  Neurological:      General: No focal deficit present  Mental Status: She is alert  Psychiatric:         Mood and Affect: Mood normal          Behavior: Behavior normal            Ultrasound Probe Disinfection    A transvaginal ultrasound was performed     Prior to use, disinfection was performed with High Level Disinfection Process (Trophon)  Probe serial number [id number]: [id number] was used    Alice Villa PA-C  04/15/21  2:43 PM a [employer] message where you will be able respond to that message and attach the photo(s). Sending the photo(s) to a cell phone number is no longer possible. If you cannot upload to [employer] you cannot have a video visit.   If it is healed, you are welcome to cancel the appointment.    Healed=no drainage, intact skin.    PLAN: 03/14/24  Do NOT soak the leg in water until completely healed/resurfaced (Pools, Whirlpools, Hot Tubs, Baths, etc.)  Dressing changes outside of clinic are being performed by Girlfriend  If the Hydrofera Blue Ready Transfer foam turns white, it means it is trapping bacteria and doing exactly what it is meant to do; if it doesn't turn white, no need to be concerned either.  Ok to use compression stocking in place of Edema Wear      Healed: Right Lateral Lower Leg and right great toe excision  Routine cleansing and moisturizing advised and continue compression with Edema Wear or compression stockings.     Wound Dressing Change: Right Anterior Lower Leg  - prepare clean surface and wash your hands with soap and water  - Cleanse with mild unscented soap (such as Cetaphil, Cerave or Dove) and water  - Apply small amount of VASHE on gauze, lay into wound bed, let sit for 10 minutes, remove gauze (do not rinse)  - OK to use microcleanse wound cleaning spray instead of Vashe  - Apply 1/20th piece of Hydrofera Blue Ready Transfer foam - cut to fit the wound bed  - Cover with one zetuvit plus 4x4 silicone bordered dressing  Pull up Edema Wear Navy Stripe (or Spandage 7) from base of toes to back of knee  Change every other day and if excess wet/soiled.     EdemaWear:  Navy Stripe EdemaWear from toes to knee.   EdemaWear should be worn 24/7 unless bathing/showering or changing the dressing. You will wash and reuse the EdemaWear.   DO NOT CUT THE EDEMAWEAR. IF IT IS TOO LONG THEN CUFF THE EDEMAWEAR (the EdemaWear can shrink length wise with washing).     Elevation: your legs above your hips for 30 mins 2  times a day to promote wound healing.    Walk as much as you can safely walk.        Main Provider: Ye Spence M.D. March 14, 2024

## 2024-05-20 ENCOUNTER — ANNUAL EXAM (OUTPATIENT)
Dept: OBGYN CLINIC | Facility: CLINIC | Age: 29
End: 2024-05-20
Payer: COMMERCIAL

## 2024-05-20 VITALS
WEIGHT: 190 LBS | DIASTOLIC BLOOD PRESSURE: 70 MMHG | SYSTOLIC BLOOD PRESSURE: 110 MMHG | BODY MASS INDEX: 30.53 KG/M2 | HEIGHT: 66 IN

## 2024-05-20 DIAGNOSIS — Z12.4 SCREENING FOR MALIGNANT NEOPLASM OF CERVIX: ICD-10-CM

## 2024-05-20 DIAGNOSIS — Z01.419 WELL WOMAN EXAM WITH ROUTINE GYNECOLOGICAL EXAM: Primary | ICD-10-CM

## 2024-05-20 PROCEDURE — 99395 PREV VISIT EST AGE 18-39: CPT | Performed by: OBSTETRICS & GYNECOLOGY

## 2024-05-20 NOTE — PROGRESS NOTES
ASSESSMENT & PLAN:   Diagnoses and all orders for this visit:    Well woman exam with routine gynecological exam  -     IGP, rfx Aptima HPV ASCU    Screening for malignant neoplasm of cervix  -     IGP, rfx Aptima HPV ASCU          The following were reviewed in today's visit: ASCCP guidelines, Gardisil vaccination, STD testing breast self exam, family planning choices, exercise, and healthy diet.    Patient to return to office in yearly for annual exam.     All questions have been answered to her satisfaction.        CC:  Annual Gynecologic Examination  Chief Complaint   Patient presents with    Gynecologic Exam     Patient is here today for her yearly exam, pap due today(20-wnl).        HPI: Jan Rodriguez is a 29 y.o.  who presents for annual gynecologic examination.  She has the following concerns:  none,  planning a vasectomy      Health Maintenance:    Exercise: intermittently  Breast exams/breast awareness: yes    Past Medical History:   Diagnosis Date    Disease of thyroid gland     postpartum thyroiditis. Hyper after delivery of twins    Female infertility     Herpes     2019    PONV (postoperative nausea and vomiting)     Pregnant 2022    Currently 5 weeks pregnant    Varicella     had chickenpox as a child       Past Surgical History:   Procedure Laterality Date     SECTION      primary for twins, B was breech    OVARIAN CYST REMOVAL Left     dermoid cyst removed, pt still has ovary    MI TX MISSED  FIRST TRIMESTER SURGICAL N/A 2021    Procedure: DILATATION AND EVACUATION (D&E) (8 WEEKS);  Surgeon: Pema Barraza MD;  Location: [facility];  Service: Gynecology    SKIN BIOPSY      TONSILLECTOMY      WISDOM TOOTH EXTRACTION         Past OB/Gyn History:   Patient's last menstrual period was 2024 (exact date).    Last Pap: 2020 : no abnormalities  History of abnormal Pap smear: no  HPV vaccine completed: unknown    Patient is currently  sexually active.   STD testing: no  Current contraception: condoms      Family History  Family History   Problem Relation Age of Onset    No Known Problems Mother     No Known Problems Father     No Known Problems Sister     No Known Problems Son     No Known Problems Son     No Known Problems Son     No Known Problems Son     Melanoma Maternal Grandmother     COPD Maternal Grandmother     Heart disease Maternal Grandfather     Heart attack Maternal Grandfather     Ovarian cancer Paternal Grandmother 50    No Known Problems Paternal Grandfather     No Known Problems Maternal Aunt     No Known Problems Maternal Aunt     No Known Problems Paternal Aunt        Family history of uterine or ovarian cancer: yes  Family history of breast cancer: no  Family history of colon cancer: no    Social History:  Social History     Socioeconomic History    Marital status: /Civil Union     Spouse name: Not on file    Number of children: Not on file    Years of education: Not on file    Highest education level: Not on file   Occupational History    Not on file   Tobacco Use    Smoking status: Never    Smokeless tobacco: Never   Vaping Use    Vaping status: Never Used   Substance and Sexual Activity    Alcohol use: No    Drug use: Never    Sexual activity: Yes     Partners: Male     Birth control/protection: None   Other Topics Concern    Not on file   Social History Narrative    Not on file     Social Determinants of Health     Financial Resource Strain: Not on file   Food Insecurity: Not on file   Transportation Needs: Not on file   Physical Activity: Not on file   Stress: Not on file   Social Connections: Not on file   Intimate Partner Violence: Not on file   Housing Stability: Not on file     Domestic violence screen: negative    Allergies:  No Known Allergies    Medications:    Current Outpatient Medications:     magnesium gluconate (MAGONATE) 500 mg tablet, Take 120 mg by mouth 2 (two) times a day, Disp: , Rfl:      "valACYclovir (VALTREX) 500 mg tablet, Take 1 tablet (500 mg total) by mouth daily, Disp: 30 tablet, Rfl: 1    Review of Systems:  Review of Systems   Constitutional: Negative.    HENT: Negative.     Respiratory: Negative.     Cardiovascular: Negative.    Gastrointestinal: Negative.    Genitourinary: Negative.    Psychiatric/Behavioral: Negative.           Physical Exam:  /70 (BP Location: Right arm, Patient Position: Sitting, Cuff Size: Large)   Ht 5' 6\" (1.676 m)   Wt 86.2 kg (190 lb)   LMP 05/17/2024 (Exact Date)   BMI 30.67 kg/m²    Physical Exam  Constitutional:       Appearance: Normal appearance.   Genitourinary:      Bladder and urethral meatus normal.      No lesions in the vagina.      Right Labia: No rash, tenderness, lesions, skin changes or Bartholin's cyst.     Left Labia: No tenderness, lesions, skin changes, Bartholin's cyst or rash.     No vaginal erythema, tenderness or bleeding.        Right Adnexa: not tender, not full and no mass present.     Left Adnexa: not tender, not full and no mass present.     Cervix is parous.      No cervical motion tenderness, discharge, lesion or polyp.      Uterus is not enlarged, fixed or tender.      No uterine mass detected.     Urethral meatus caruncle not present.     No urethral tenderness or mass present.   Breasts:     Right: No swelling, bleeding, inverted nipple, mass, nipple discharge, skin change or tenderness.      Left: No swelling, bleeding, inverted nipple, mass, nipple discharge, skin change or tenderness.   HENT:      Head: Normocephalic and atraumatic.   Eyes:      Extraocular Movements: Extraocular movements intact.      Conjunctiva/sclera: Conjunctivae normal.      Pupils: Pupils are equal, round, and reactive to light.   Cardiovascular:      Rate and Rhythm: Normal rate and regular rhythm.      Heart sounds: Normal heart sounds. No murmur heard.  Pulmonary:      Effort: Pulmonary effort is normal. No respiratory distress.      Breath " sounds: Normal breath sounds. No wheezing or rales.   Abdominal:      General: There is no distension.      Palpations: Abdomen is soft.      Tenderness: There is no abdominal tenderness. There is no guarding.   Neurological:      General: No focal deficit present.      Mental Status: She is alert and oriented to person, place, and time.   Skin:     General: Skin is warm and dry.   Psychiatric:         Mood and Affect: Mood normal.         Behavior: Behavior normal.   Vitals and nursing note reviewed.

## 2024-05-23 LAB
CYTOLOGIST CVX/VAG CYTO: NORMAL
DX ICD CODE: NORMAL
OTHER STN SPEC: NORMAL
OTHER STN SPEC: NORMAL
PATH REPORT.FINAL DX SPEC: NORMAL
SL AMB NOTE:: NORMAL
SL AMB SPECIMEN ADEQUACY: NORMAL
SL AMB TEST METHODOLOGY: NORMAL

## 2024-10-07 ENCOUNTER — OFFICE VISIT (OUTPATIENT)
Age: 29
End: 2024-10-07
Payer: COMMERCIAL

## 2024-10-07 VITALS — WEIGHT: 182 LBS | BODY MASS INDEX: 29.38 KG/M2 | TEMPERATURE: 97.3 F

## 2024-10-07 DIAGNOSIS — D22.72 MULTIPLE BENIGN MELANOCYTIC NEVI OF UPPER AND LOWER EXTREMITIES AND TRUNK: Primary | ICD-10-CM

## 2024-10-07 DIAGNOSIS — D22.71 MULTIPLE BENIGN MELANOCYTIC NEVI OF UPPER AND LOWER EXTREMITIES AND TRUNK: Primary | ICD-10-CM

## 2024-10-07 DIAGNOSIS — L81.4 LENTIGO: ICD-10-CM

## 2024-10-07 DIAGNOSIS — D22.61 MULTIPLE BENIGN MELANOCYTIC NEVI OF UPPER AND LOWER EXTREMITIES AND TRUNK: Primary | ICD-10-CM

## 2024-10-07 DIAGNOSIS — D22.5 MULTIPLE BENIGN MELANOCYTIC NEVI OF UPPER AND LOWER EXTREMITIES AND TRUNK: Primary | ICD-10-CM

## 2024-10-07 DIAGNOSIS — D22.62 MULTIPLE BENIGN MELANOCYTIC NEVI OF UPPER AND LOWER EXTREMITIES AND TRUNK: Primary | ICD-10-CM

## 2024-10-07 PROCEDURE — 99213 OFFICE O/P EST LOW 20 MIN: CPT | Performed by: DERMATOLOGY

## 2024-10-07 NOTE — PATIENT INSTRUCTIONS
"MELANOCYTIC NEVI (\"Moles\")    Physical Exam:  Anatomic Location Affected:   Mostly on sun-exposed areas of the trunk and extremities  Morphological Description:  Scattered, 1-4mm round to ovoid, symmetrical-appearing, even bordered, skin colored to dark brown macules/papules, mostly in sun-exposed areas  Pertinent Positives:  Pertinent Negatives:    Additional History of Present Condition:  family hx of MM with maternal grandmother; patient had some clinically atypical nevi removed in past, nevi's compared with photos in 2022 and all stable    Assessment and Plan:  Based on a thorough discussion of this condition and the management approach to it (including a comprehensive discussion of the known risks, side effects and potential benefits of treatment), the patient (family) agrees to implement the following specific plan:  When outside we recommend using a wide brim hat, sunglasses, long sleeve and pants, sunscreen with SPF 30+ with reapplication every 2 hours, or SPF specific clothing   Benign, reassured  Annual skin check     Melanocytic Nevi  Melanocytic nevi (\"moles\") are tan or brown, raised or flat areas of the skin which have an increased number of melanocytes. Melanocytes are the cells in our body which make pigment and account for skin color.    Some moles are present at birth (I.e., \"congenital nevi\"), while others come up later in life (i.e., \"acquired nevi\").  The sun can stimulate the body to make more moles.  Sunburns are not the only thing that triggers more moles.  Chronic sun exposure can do it too.     Clinically distinguishing a healthy mole from melanoma may be difficult, even for experienced dermatologists. The \"ABCDE's\" of moles have been suggested as a means of helping to alert a person to a suspicious mole and the possible increased risk of melanoma.  The suggestions for raising alert are as follows:    Asymmetry: Healthy moles tend to be symmetric, while melanomas are often asymmetric.  " "Asymmetry means if you draw a line through the mole, the two halves do not match in color, size, shape, or surface texture. Asymmetry can be a result of rapid enlargement of a mole, the development of a raised area on a previously flat lesion, scaling, ulceration, bleeding or scabbing within the mole.  Any mole that starts to demonstrate \"asymmetry\" should be examined promptly by a board certified dermatologist.     Border: Healthy moles tend to have discrete, even borders.  The border of a melanoma often blends into the normal skin and does not sharply delineate the mole from normal skin.  Any mole that starts to demonstrate \"uneven borders\" should be examined promptly by a board certified dermatologist.     Color: Healthy moles tend to be one color throughout.  Melanomas tend to be made up of different colors ranging from dark black, blue, white, or red.  Any mole that demonstrates a color change should be examined promptly by a board certified dermatologist.     Diameter: Healthy moles tend to be smaller than 0.6 cm in size; an exception are \"congenital nevi\" that can be larger.  Melanomas tend to grow and can often be greater than 0.6 cm (1/4 of an inch, or the size of a pencil eraser). This is only a guideline, and many normal moles may be larger than 0.6 cm without being unhealthy.  Any mole that starts to change in size (small to bigger or bigger to smaller) should be examined promptly by a board certified dermatologist.     Evolving: Healthy moles tend to \"stay the same.\"  Melanomas may often show signs of change or evolution such as a change in size, shape, color, or elevation.  Any mole that starts to itch, bleed, crust, burn, hurt, or ulcerate or demonstrate a change or evolution should be examined promptly by a board certified dermatologist.      LENTIGO    Physical Exam:  Anatomic Location Affected:  trunk, arms  Morphological Description:  Light brown macules  Pertinent Positives:  Pertinent " Negatives:    Assessment and Plan:  Based on a thorough discussion of this condition and the management approach to it (including a comprehensive discussion of the known risks, side effects and potential benefits of treatment), the patient (family) agrees to implement the following specific plan:  When outside we recommend using a wide brim hat, sunglasses, long sleeve and pants, sunscreen with SPF 30+ with reapplication every 2 hours, or SPF specific clothing     What is a lentigo?  A lentigo is a pigmented flat or slightly raised lesion with a clearly defined edge. Unlike an ephelis (freckle), it does not fade in the winter months. There are several kinds of lentigo.  The name lentigo originally referred to its appearance resembling a small lentil. The plural of lentigo is lentigines, although “lentigos” is also in common use.    Who gets lentigines?  Lentigines can affect males and females of all ages and races. Solar lentigines are especially prevalent in fair skinned adults. Lentigines associated with syndromes are present at birth or arise during childhood.    What causes lentigines?  Common forms of lentigo are due to exposure to ultraviolet radiation:  Sun damage including sunburn   Indoor tanning   Phototherapy, especially photochemotherapy (PUVA)    Ionizing radiation, eg radiation therapy, can also cause lentigines.  Several familial syndromes associated with widespread lentigines originate from mutations in Jorje-MAP kinase, mTOR signaling and PTEN pathways.    What is the treatment for lentigines?  Most lentigines are left alone. Attempts to lighten them may not be successful. The following approaches are used:  SPF 50+ broad-spectrum sunscreen   Hydroquinone bleaching cream   Alpha hydroxy acids   Vitamin C   Retinoids   Azelaic acid   Chemical peels  Individual lesions can be permanently removed using:  Cryotherapy   Intense pulsed light   Pigment lasers    How can lentigines be prevented?  Lentigines  associated with exposure ultraviolet radiation can be prevented by very careful sun protection. Clothing is more successful at preventing new lentigines than are sunscreens.    What is the outlook for lentigines?  Lentigines usually persist. They may increase in number with age and sun exposure. Some in sun-protected sites may fade and disappear.

## 2024-10-07 NOTE — PROGRESS NOTES
"[facility] Note     Patient Name: Jan Rodriguez  Encounter Date: 10/7/24     Have you been cared for by a [facility] Dermatologist in the last 3 years and, if so, which description applies to you?    Yes.  I have been here within the last 3 years, and my medical history has NOT changed since that time.  I am FEMALE/of child-bearing potential.    REVIEW OF SYSTEMS:  Have you recently had or currently have any of the following? No changes in my recent health.   PAST MEDICAL HISTORY:  Have you personally ever had or currently have any of the following?  If \"YES,\" then please provide more detail. No changes in my medical history.   HISTORY OF IMMUNOSUPPRESSION: Do you have a history of any of the following:  Systemic Immunosuppression such as Diabetes, Biologic or Immunotherapy, Chemotherapy, Organ Transplantation, Bone Marrow Transplantation or Prednisone?  No     Answering \"YES\" requires the addition of the dotphrase \"IMMUNOSUPPRESSED\" as the first diagnosis of the patient's visit.   FAMILY HISTORY:  Any \"first degree relatives\" (parent, brother, sister, or child) with the following?    No changes in my family's known health.   PATIENT EXPERIENCE:    Do you want the Dermatologist to perform a COMPLETE skin exam today including a clinical examination under the \"bra and underwear\" areas?  Yes  If necessary, do we have your permission to call and leave a detailed message on your Preferred Phone number that includes your specific medical information?  Yes      No Known Allergies   Current Outpatient Medications:     magnesium gluconate (MAGONATE) 500 mg tablet, Take 120 mg by mouth 2 (two) times a day, Disp: , Rfl:     valACYclovir (VALTREX) 500 mg tablet, Take 1 tablet (500 mg total) by mouth daily, Disp: 30 tablet, Rfl: 1          Whom besides the patient is providing clinical information about today's encounter?   NO ADDITIONAL HISTORIAN (patient alone provided history)    Physical Exam and " "Assessment/Plan by Diagnosis:      MELANOCYTIC NEVI (\"Moles\")    Physical Exam:  Anatomic Location Affected:   Mostly on sun-exposed areas of the trunk and extremities  Morphological Description:  Scattered, 0.1- 1cm round to ovoid,  skin colored to dark brown macules/papules, some mild asymmetry and ill defined borders  Pertinent Positives:  Pertinent Negatives:    Additional History of Present Condition:  family hx of MM with maternal grandmother; patient had some clinically atypical nevi removed in past, all were benign. nevi's compared with photos in 2022 and all stable    Assessment and Plan:  Based on a thorough discussion of this condition and the management approach to it (including a comprehensive discussion of the known risks, side effects and potential benefits of treatment), the patient (family) agrees to implement the following specific plan:  When outside we recommend using a wide brim hat, sunglasses, long sleeve and pants, sunscreen with SPF 30+ with reapplication every 2 hours, or SPF specific clothing   Benign, reassured  Annual skin check     Melanocytic Nevi  Melanocytic nevi (\"moles\") are tan or brown, raised or flat areas of the skin which have an increased number of melanocytes. Melanocytes are the cells in our body which make pigment and account for skin color.    Some moles are present at birth (I.e., \"congenital nevi\"), while others come up later in life (i.e., \"acquired nevi\").  The sun can stimulate the body to make more moles.  Sunburns are not the only thing that triggers more moles.  Chronic sun exposure can do it too.     Clinically distinguishing a healthy mole from melanoma may be difficult, even for experienced dermatologists. The \"ABCDE's\" of moles have been suggested as a means of helping to alert a person to a suspicious mole and the possible increased risk of melanoma.  The suggestions for raising alert are as follows:    Asymmetry: Healthy moles tend to be symmetric, while " "melanomas are often asymmetric.  Asymmetry means if you draw a line through the mole, the two halves do not match in color, size, shape, or surface texture. Asymmetry can be a result of rapid enlargement of a mole, the development of a raised area on a previously flat lesion, scaling, ulceration, bleeding or scabbing within the mole.  Any mole that starts to demonstrate \"asymmetry\" should be examined promptly by a board certified dermatologist.     Border: Healthy moles tend to have discrete, even borders.  The border of a melanoma often blends into the normal skin and does not sharply delineate the mole from normal skin.  Any mole that starts to demonstrate \"uneven borders\" should be examined promptly by a board certified dermatologist.     Color: Healthy moles tend to be one color throughout.  Melanomas tend to be made up of different colors ranging from dark black, blue, white, or red.  Any mole that demonstrates a color change should be examined promptly by a board certified dermatologist.     Diameter: Healthy moles tend to be smaller than 0.6 cm in size; an exception are \"congenital nevi\" that can be larger.  Melanomas tend to grow and can often be greater than 0.6 cm (1/4 of an inch, or the size of a pencil eraser). This is only a guideline, and many normal moles may be larger than 0.6 cm without being unhealthy.  Any mole that starts to change in size (small to bigger or bigger to smaller) should be examined promptly by a board certified dermatologist.     Evolving: Healthy moles tend to \"stay the same.\"  Melanomas may often show signs of change or evolution such as a change in size, shape, color, or elevation.  Any mole that starts to itch, bleed, crust, burn, hurt, or ulcerate or demonstrate a change or evolution should be examined promptly by a board certified dermatologist.      LENTIGO    Physical Exam:  Anatomic Location Affected:  trunk, arms  Morphological Description:  Light brown macules  Pertinent " Positives:  Pertinent Negatives:    Assessment and Plan:  Based on a thorough discussion of this condition and the management approach to it (including a comprehensive discussion of the known risks, side effects and potential benefits of treatment), the patient (family) agrees to implement the following specific plan:  When outside we recommend using a wide brim hat, sunglasses, long sleeve and pants, sunscreen with SPF 30+ with reapplication every 2 hours, or SPF specific clothing     What is a lentigo?  A lentigo is a pigmented flat or slightly raised lesion with a clearly defined edge. Unlike an ephelis (freckle), it does not fade in the winter months. There are several kinds of lentigo.  The name lentigo originally referred to its appearance resembling a small lentil. The plural of lentigo is lentigines, although “lentigos” is also in common use.    Who gets lentigines?  Lentigines can affect males and females of all ages and races. Solar lentigines are especially prevalent in fair skinned adults. Lentigines associated with syndromes are present at birth or arise during childhood.    What causes lentigines?  Common forms of lentigo are due to exposure to ultraviolet radiation:  Sun damage including sunburn   Indoor tanning   Phototherapy, especially photochemotherapy (PUVA)    Ionizing radiation, eg radiation therapy, can also cause lentigines.  Several familial syndromes associated with widespread lentigines originate from mutations in Jorje-MAP kinase, mTOR signaling and PTEN pathways.    What is the treatment for lentigines?  Most lentigines are left alone. Attempts to lighten them may not be successful. The following approaches are used:  SPF 50+ broad-spectrum sunscreen   Hydroquinone bleaching cream   Alpha hydroxy acids   Vitamin C   Retinoids   Azelaic acid   Chemical peels  Individual lesions can be permanently removed using:  Cryotherapy   Intense pulsed light   Pigment lasers    How can lentigines be  prevented?  Lentigines associated with exposure ultraviolet radiation can be prevented by very careful sun protection. Clothing is more successful at preventing new lentigines than are sunscreens.    What is the outlook for lentigines?  Lentigines usually persist. They may increase in number with age and sun exposure. Some in sun-protected sites may fade and disappear.      Scribe Attestation      I,:  Maria M Kumar am acting as a scribe while in the presence of the attending physician.:       I,:  Roxane Sneed MD personally performed the services described in this documentation    as scribed in my presence.:

## 2025-03-18 ENCOUNTER — NURSE TRIAGE (OUTPATIENT)
Age: 30
End: 2025-03-18

## 2025-03-18 NOTE — TELEPHONE ENCOUNTER
Regarding: pain from right armpit into breast  ----- Message from Rigoberto BAER sent at 3/18/2025  1:35 PM EDT -----  Patient called with pain from her right armpit into her breast. Patient has had this for 2 weeks now.

## 2025-03-18 NOTE — TELEPHONE ENCOUNTER
"FOLLOW UP: Office Visit     REASON FOR CONVERSATION: Breast Pain    SYMPTOMS: breast lump/pain    OTHER: patient complains of right armpit pain radiating into right breast and breast lumps. Denies any other symptoms. Patient is leaving town this Friday and will need appt for next week. Warm transfer to [facility] complete for scheduling assistance.     DISPOSITION: See Within 3 Days in Office        Reason for Disposition   Breast lump    Answer Assessment - Initial Assessment Questions  1. SYMPTOM: \"What's the main symptom you're concerned about?\"  (e.g., lump, nipple discharge, pain, rash )      Pain In right armpit into right breast starting 2 weeks ago  2. LOCATION: \"Where is the pain located?\"      R armpit into right breast  3. ONSET: \"When did pain  start?\"      2 weeks ago  4. PRIOR HISTORY: \"Do you have any history of prior problems with your breasts?\" (e.g., breast cancer, breast implant, fibrocystic breast disease)      Had similar symptoms 2 years ago and u/s revealed it was lymph nodes  5. CAUSE: \"What do you think is causing this symptom?\"      unsure  6. OTHER SYMPTOMS: \"Do you have any other symptoms?\" (e.g., fever, breast pain, nipple discharge, redness or rash)      denies  7. PREGNANCY-BREASTFEEDING: \"Is there any chance you are pregnant?\" \"When was your last menstrual period?\" \"Are you breastfeeding?\"      2/21    Protocols used: Breast Symptoms-Adult-[place]    "

## 2025-03-25 ENCOUNTER — OFFICE VISIT (OUTPATIENT)
Dept: OBGYN CLINIC | Facility: CLINIC | Age: 30
End: 2025-03-25
Payer: COMMERCIAL

## 2025-03-25 VITALS — WEIGHT: 180 LBS | BODY MASS INDEX: 29.05 KG/M2 | DIASTOLIC BLOOD PRESSURE: 60 MMHG | SYSTOLIC BLOOD PRESSURE: 98 MMHG

## 2025-03-25 DIAGNOSIS — N63.11 BREAST LUMP ON RIGHT SIDE AT 10 O'CLOCK POSITION: Primary | ICD-10-CM

## 2025-03-25 DIAGNOSIS — N64.4 BREAST PAIN, RIGHT: ICD-10-CM

## 2025-03-25 PROCEDURE — 99213 OFFICE O/P EST LOW 20 MIN: CPT | Performed by: PHYSICIAN ASSISTANT

## 2025-03-25 NOTE — PROGRESS NOTES
ASSESSMENT/PLAN:    Encounter Diagnosis     ICD-10-CM    1. Breast lump on right side at 10 o'clock position  N63.11 Mammo diagnostic right w 3d and cad     US breast right limited (diagnostic)      2. Breast pain, right  N64.4 Mammo diagnostic right w 3d and cad     US breast right limited (diagnostic)            SUBJECTIVE/HPI:      Patient ID: Jan Rodriguez [id number]        Jan Rodriguez is a 29 y.o.  presenting to the office with 2 weeks of persistent right sided breast pain and breast lump. She had this area imaged a few years ago. She states it is somewhat tender to touch. There are no skin changes. She has no family history of breast cancer. She is not breastfeeding. She denies injury to the area.       HISTORY:    Patient Active Problem List   Diagnosis    PONV (postoperative nausea and vomiting)    HSV infection       No Known Allergies      Current Outpatient Medications:     magnesium gluconate (MAGONATE) 500 mg tablet, Take 120 mg by mouth 2 (two) times a day, Disp: , Rfl:     valACYclovir (VALTREX) 500 mg tablet, Take 1 tablet (500 mg total) by mouth daily, Disp: 30 tablet, Rfl: 1    OB History          5    Para   3    Term   2       1    AB   2    Living   4         SAB   2    IAB   0    Ectopic   0    Multiple   1    Live Births   4           Obstetric Comments   Menarche 13               Past Medical History:   Diagnosis Date    Disease of thyroid gland     postpartum thyroiditis. Hyper after delivery of twins    Female infertility     Herpes     2019    PONV (postoperative nausea and vomiting)     Pregnant 2022    Currently 5 weeks pregnant    Varicella     had chickenpox as a child        Past Surgical History:   Procedure Laterality Date     SECTION      primary for twins, B was breech    OVARIAN CYST REMOVAL Left     dermoid cyst removed, pt still has ovary    RI TX MISSED  FIRST TRIMESTER SURGICAL N/A 2021    Procedure: DILATATION AND EVACUATION  (D&E) (8 WEEKS);  Surgeon: Pema Barraza MD;  Location: [facility];  Service: Gynecology    SKIN BIOPSY      TONSILLECTOMY  2015    WISDOM TOOTH EXTRACTION  2012        Family History   Problem Relation Age of Onset    No Known Problems Mother     No Known Problems Father     No Known Problems Sister     No Known Problems Son     No Known Problems Son     No Known Problems Son     No Known Problems Son     Melanoma Maternal Grandmother     COPD Maternal Grandmother     Heart disease Maternal Grandfather     Heart attack Maternal Grandfather     Ovarian cancer Paternal Grandmother 50    No Known Problems Paternal Grandfather     No Known Problems Maternal Aunt     No Known Problems Maternal Aunt     No Known Problems Paternal Aunt         REVIEW OF SYSTEMS:  Review of Systems   Skin:  Negative for color change and rash.        OBJECTIVE:    Visit Vitals  BP 98/60 (BP Location: Left arm, Patient Position: Sitting, Cuff Size: Standard)   Wt 81.6 kg (180 lb)   LMP 03/22/2025 (Exact Date)   Breastfeeding No   BMI 29.05 kg/m²   OB Status Having periods   Smoking Status Never   BSA 1.91 m²         Physical Exam  Constitutional:       General: She is not in acute distress.     Appearance: Normal appearance. She is normal weight. She is not ill-appearing, toxic-appearing or diaphoretic.   Genitourinary:   Breasts:     Breasts are symmetrical.      Right: Mass (2-3cm firm, mobile, mildly tender area at 10oclock position of upper outer quadrant of right breast.) and tenderness present. No swelling, bleeding, nipple discharge or skin change.      Left: No swelling, bleeding, mass, nipple discharge, skin change or tenderness.   HENT:      Head: Normocephalic and atraumatic.   Chest:       Lymphadenopathy:      Upper Body:      Right upper body: No axillary adenopathy.      Left upper body: No axillary adenopathy.   Neurological:      Mental Status: She is alert.   Vitals reviewed.               I have spent a total time  of 20 minutes in caring for this patient on the day of the visit/encounter including Instructions for management, Patient and family education, Counseling / Coordination of care, Documenting in the medical record, Reviewing/placing orders in the medical record (including tests, medications, and/or procedures), and Obtaining or reviewing history  .

## 2025-03-28 ENCOUNTER — RESULTS FOLLOW-UP (OUTPATIENT)
Dept: OBGYN CLINIC | Facility: CLINIC | Age: 30
End: 2025-03-28

## (undated) DEVICE — MAXI PAD5.51 X 13.78 IN. (14.0 X 35.0 CM)HEAVYCONTOUREDUNSCENTED: Brand: CURITY

## (undated) DEVICE — CURETTE VACURETTE CRVD 7MM

## (undated) DEVICE — COLLECTION SET, DISPOSABLE WITH HANDLE AND TAPERED FITTINGS TUBING, 6 FT (183 CM): Brand: GYRUS ACMI

## (undated) DEVICE — PVC URETHRAL CATHETER: Brand: DOVER

## (undated) DEVICE — PREMIUM DRY TRAY LF: Brand: MEDLINE INDUSTRIES, INC.

## (undated) DEVICE — CHLORHEXIDINE 4PCT 4 OZ

## (undated) DEVICE — D + E CONNECTION HOSE

## (undated) DEVICE — STERILE SURGICAL LUBRICANT,  TUBE: Brand: SURGILUBE

## (undated) DEVICE — GLOVE INDICATOR PI UNDERGLOVE SZ 7 BLUE

## (undated) DEVICE — STRL ALLENTOWN HYSTEROSCOPY PK: Brand: CARDINAL HEALTH

## (undated) DEVICE — D + E SUCTION CANISTER

## (undated) DEVICE — LIGHT HANDLE COVER SLEEVE DISP BLUE STELLAR

## (undated) DEVICE — D + E SAFE TOUCH TISSUE TRAP (CIRCON)

## (undated) DEVICE — GLOVE SRG BIOGEL ECLIPSE 6.5

## (undated) DEVICE — SPONGE LAP 18 X 18 IN STRL RFD